# Patient Record
Sex: FEMALE | Race: WHITE | Employment: PART TIME | URBAN - NONMETROPOLITAN AREA
[De-identification: names, ages, dates, MRNs, and addresses within clinical notes are randomized per-mention and may not be internally consistent; named-entity substitution may affect disease eponyms.]

---

## 2017-08-26 ENCOUNTER — HOSPITAL ENCOUNTER (EMERGENCY)
Age: 42
Discharge: HOME | End: 2017-08-26
Payer: COMMERCIAL

## 2017-08-26 VITALS
SYSTOLIC BLOOD PRESSURE: 119 MMHG | RESPIRATION RATE: 20 BRPM | HEART RATE: 91 BPM | HEIGHT: 65 IN | DIASTOLIC BLOOD PRESSURE: 56 MMHG | TEMPERATURE: 98.3 F | OXYGEN SATURATION: 95 %

## 2017-08-26 DIAGNOSIS — N30.01 ACUTE CYSTITIS WITH HEMATURIA: ICD-10-CM

## 2017-08-26 DIAGNOSIS — J44.1 ACUTE EXACERBATION OF CHRONIC OBSTRUCTIVE PULMONARY DISEASE (COPD) (HCC): Primary | ICD-10-CM

## 2017-08-26 LAB
BILIRUBIN URINE: NEGATIVE
BLOOD, URINE: ABNORMAL
CHARACTER, URINE: ABNORMAL
COLOR: YELLOW
GLUCOSE, URINE: NEGATIVE MG/DL
KETONES, URINE: NEGATIVE
LEUKOCYTES, UA: ABNORMAL
NITRATE, UA: NEGATIVE
PH UA: 5.5 (ref 5–9)
PROTEIN UA: 100 MG/DL
REFLEX TO URINE C & S: ABNORMAL
SPECIFIC GRAVITY UA: 1.02 (ref 1–1.03)
UROBILINOGEN, URINE: 0.2 EU/DL (ref 0–1)

## 2017-08-26 PROCEDURE — 6360000002 HC RX W HCPCS: Performed by: NURSE PRACTITIONER

## 2017-08-26 PROCEDURE — 87086 URINE CULTURE/COLONY COUNT: CPT

## 2017-08-26 PROCEDURE — 87186 SC STD MICRODIL/AGAR DIL: CPT

## 2017-08-26 PROCEDURE — 96372 THER/PROPH/DIAG INJ SC/IM: CPT

## 2017-08-26 PROCEDURE — 99214 OFFICE O/P EST MOD 30 MIN: CPT

## 2017-08-26 PROCEDURE — 99214 OFFICE O/P EST MOD 30 MIN: CPT | Performed by: NURSE PRACTITIONER

## 2017-08-26 PROCEDURE — 87184 SC STD DISK METHOD PER PLATE: CPT

## 2017-08-26 PROCEDURE — 81003 URINALYSIS AUTO W/O SCOPE: CPT

## 2017-08-26 PROCEDURE — 87077 CULTURE AEROBIC IDENTIFY: CPT

## 2017-08-26 PROCEDURE — 6370000000 HC RX 637 (ALT 250 FOR IP): Performed by: NURSE PRACTITIONER

## 2017-08-26 RX ORDER — PHENAZOPYRIDINE HYDROCHLORIDE 100 MG/1
100 TABLET, FILM COATED ORAL 3 TIMES DAILY PRN
Qty: 6 TABLET | Refills: 0 | Status: SHIPPED | OUTPATIENT
Start: 2017-08-26 | End: 2017-08-29

## 2017-08-26 RX ORDER — METHYLPREDNISOLONE ACETATE 80 MG/ML
80 INJECTION, SUSPENSION INTRA-ARTICULAR; INTRALESIONAL; INTRAMUSCULAR; SOFT TISSUE ONCE
Status: COMPLETED | OUTPATIENT
Start: 2017-08-26 | End: 2017-08-26

## 2017-08-26 RX ORDER — ALBUTEROL SULFATE 90 UG/1
2 AEROSOL, METERED RESPIRATORY (INHALATION) EVERY 6 HOURS PRN
Qty: 1 INHALER | Refills: 0 | Status: SHIPPED | OUTPATIENT
Start: 2017-08-26 | End: 2017-10-07 | Stop reason: ALTCHOICE

## 2017-08-26 RX ORDER — SULFAMETHOXAZOLE AND TRIMETHOPRIM 800; 160 MG/1; MG/1
1 TABLET ORAL 2 TIMES DAILY
Qty: 6 TABLET | Refills: 0 | Status: SHIPPED | OUTPATIENT
Start: 2017-08-26 | End: 2017-08-29

## 2017-08-26 RX ORDER — IPRATROPIUM BROMIDE AND ALBUTEROL SULFATE 2.5; .5 MG/3ML; MG/3ML
1 SOLUTION RESPIRATORY (INHALATION) ONCE
Status: COMPLETED | OUTPATIENT
Start: 2017-08-26 | End: 2017-08-26

## 2017-08-26 RX ORDER — ALBUTEROL SULFATE 2.5 MG/3ML
2.5 SOLUTION RESPIRATORY (INHALATION) ONCE
Status: COMPLETED | OUTPATIENT
Start: 2017-08-26 | End: 2017-08-26

## 2017-08-26 RX ORDER — ALBUTEROL SULFATE 2.5 MG/3ML
SOLUTION RESPIRATORY (INHALATION)
Status: DISCONTINUED
Start: 2017-08-26 | End: 2017-08-26 | Stop reason: HOSPADM

## 2017-08-26 RX ADMIN — IPRATROPIUM BROMIDE AND ALBUTEROL SULFATE 1 AMPULE: .5; 3 SOLUTION RESPIRATORY (INHALATION) at 16:17

## 2017-08-26 RX ADMIN — ALBUTEROL SULFATE 2.5 MG: 2.5 SOLUTION RESPIRATORY (INHALATION) at 15:44

## 2017-08-26 RX ADMIN — METHYLPREDNISOLONE ACETATE 80 MG: 80 INJECTION, SUSPENSION INTRA-ARTICULAR; INTRALESIONAL; INTRAMUSCULAR; SOFT TISSUE at 16:32

## 2017-08-26 ASSESSMENT — ENCOUNTER SYMPTOMS
ABDOMINAL PAIN: 0
CONSTIPATION: 0
DIARRHEA: 0
VOMITING: 0
WHEEZING: 1
COUGH: 1
SHORTNESS OF BREATH: 1
SINUS PRESSURE: 0
CHEST TIGHTNESS: 1
SORE THROAT: 0
NAUSEA: 0

## 2017-08-26 ASSESSMENT — PAIN DESCRIPTION - LOCATION: LOCATION: ABDOMEN

## 2017-08-26 ASSESSMENT — PAIN DESCRIPTION - ORIENTATION: ORIENTATION: LOWER

## 2017-08-26 ASSESSMENT — PAIN DESCRIPTION - DESCRIPTORS: DESCRIPTORS: PRESSURE

## 2017-08-26 ASSESSMENT — PAIN SCALES - GENERAL: PAINLEVEL_OUTOF10: 8

## 2017-08-28 LAB
ORGANISM: ABNORMAL
URINE CULTURE REFLEX: ABNORMAL

## 2017-10-07 ENCOUNTER — HOSPITAL ENCOUNTER (EMERGENCY)
Age: 42
Discharge: HOME | End: 2017-10-07
Payer: COMMERCIAL

## 2017-10-07 VITALS
SYSTOLIC BLOOD PRESSURE: 114 MMHG | BODY MASS INDEX: 40.27 KG/M2 | OXYGEN SATURATION: 97 % | DIASTOLIC BLOOD PRESSURE: 79 MMHG | TEMPERATURE: 97.2 F | HEART RATE: 69 BPM | WEIGHT: 242 LBS | RESPIRATION RATE: 16 BRPM

## 2017-10-07 DIAGNOSIS — R30.0 DYSURIA: ICD-10-CM

## 2017-10-07 DIAGNOSIS — N89.8 VAGINAL ODOR: ICD-10-CM

## 2017-10-07 DIAGNOSIS — A64 STI (SEXUALLY TRANSMITTED INFECTION): Primary | ICD-10-CM

## 2017-10-07 LAB
BILIRUBIN URINE: NEGATIVE
BLOOD, URINE: NEGATIVE
CHARACTER, URINE: CLEAR
CHLAMYDIA TRACHOMATIS BY RT-PCR: NOT DETECTED
COLOR: YELLOW
CT/NG SOURCE: NORMAL
GLUCOSE, URINE: NEGATIVE MG/DL
KETONES, URINE: NEGATIVE
LEUKOCYTES, UA: NEGATIVE
NEISSERIA GONORRHOEAE BY RT-PCR: NOT DETECTED
NITRATE, UA: NEGATIVE
PH UA: 7 (ref 5–9)
PROTEIN UA: NEGATIVE MG/DL
REFLEX TO URINE C & S: NORMAL
SPECIFIC GRAVITY UA: 1.02 (ref 1–1.03)
TRICHOMONAS PREP: POSITIVE
UROBILINOGEN, URINE: 0.2 EU/DL (ref 0–1)

## 2017-10-07 PROCEDURE — 87491 CHLMYD TRACH DNA AMP PROBE: CPT

## 2017-10-07 PROCEDURE — 96372 THER/PROPH/DIAG INJ SC/IM: CPT

## 2017-10-07 PROCEDURE — 6370000000 HC RX 637 (ALT 250 FOR IP): Performed by: NURSE PRACTITIONER

## 2017-10-07 PROCEDURE — 99214 OFFICE O/P EST MOD 30 MIN: CPT | Performed by: NURSE PRACTITIONER

## 2017-10-07 PROCEDURE — 87070 CULTURE OTHR SPECIMN AEROBIC: CPT

## 2017-10-07 PROCEDURE — 87205 SMEAR GRAM STAIN: CPT

## 2017-10-07 PROCEDURE — 99213 OFFICE O/P EST LOW 20 MIN: CPT

## 2017-10-07 PROCEDURE — 87077 CULTURE AEROBIC IDENTIFY: CPT

## 2017-10-07 PROCEDURE — 2500000003 HC RX 250 WO HCPCS: Performed by: NURSE PRACTITIONER

## 2017-10-07 PROCEDURE — 87591 N.GONORRHOEAE DNA AMP PROB: CPT

## 2017-10-07 PROCEDURE — 6360000002 HC RX W HCPCS: Performed by: NURSE PRACTITIONER

## 2017-10-07 PROCEDURE — 81003 URINALYSIS AUTO W/O SCOPE: CPT

## 2017-10-07 PROCEDURE — 87808 TRICHOMONAS ASSAY W/OPTIC: CPT

## 2017-10-07 RX ORDER — AZITHROMYCIN 250 MG/1
1000 TABLET, FILM COATED ORAL ONCE
Status: COMPLETED | OUTPATIENT
Start: 2017-10-07 | End: 2017-10-07

## 2017-10-07 RX ORDER — METRONIDAZOLE 500 MG/1
2000 TABLET ORAL ONCE
Status: COMPLETED | OUTPATIENT
Start: 2017-10-07 | End: 2017-10-07

## 2017-10-07 RX ORDER — HYDROXYZINE PAMOATE 25 MG/1
25 CAPSULE ORAL 3 TIMES DAILY PRN
COMMUNITY
End: 2018-05-05

## 2017-10-07 RX ADMIN — METRONIDAZOLE 2000 MG: 500 TABLET ORAL at 10:52

## 2017-10-07 RX ADMIN — LIDOCAINE HYDROCHLORIDE 250 MG: 10 INJECTION, SOLUTION EPIDURAL; INFILTRATION; INTRACAUDAL; PERINEURAL at 10:53

## 2017-10-07 RX ADMIN — AZITHROMYCIN 1000 MG: 250 TABLET, FILM COATED ORAL at 10:52

## 2017-10-07 ASSESSMENT — ENCOUNTER SYMPTOMS
ABDOMINAL PAIN: 0
EYE PAIN: 0
WHEEZING: 0
COUGH: 0
NAUSEA: 1
CHOKING: 0
ABDOMINAL DISTENTION: 0
SHORTNESS OF BREATH: 0
ANAL BLEEDING: 0

## 2017-10-07 NOTE — PROGRESS NOTES
Pt ambulatory to restroom, vaginal swabs obtained per pt along with a clean catch urine. Specimens to lab.

## 2017-10-07 NOTE — ED TRIAGE NOTES
Pt to room 4 with c/o a foul smelling yellowish vaginal discharge x 2 days. Pt is currently on an antibiotic for an URI but she cannot remember what it is. She states she has been on it for 7 days and is to take for 10. She would like to be tested for all STDs due to her partner is a  and is gone for long periods of time and she states she doesn't trust him to be faithful.

## 2017-10-07 NOTE — ED PROVIDER NOTES
CONTINUE these medications which have NOT CHANGED    Details   VORTIoxetine HBr (TRINTELLIX) 20 MG TABS tablet Take 10 mg by mouth 2 times daily      hydrOXYzine (VISTARIL) 25 MG capsule Take 25 mg by mouth 3 times daily as needed for Itching      albuterol-ipratropium (COMBIVENT RESPIMAT)  MCG/ACT AERS inhaler Inhale 1 puff into the lungs every 6 hours as needed for Wheezing  Qty: 1 Inhaler, Refills: 0             ALLERGIES     Patient is is allergic to penicillins. FAMILY HISTORY     Patient's family history includes Asthma in her mother; COPD in her mother. SOCIAL HISTORY     Patient  reports that she has been smoking Cigarettes. She has a 20.00 pack-year smoking history. She has never used smokeless tobacco. She reports that she does not drink alcohol or use drugs. PHYSICAL EXAM     ED TRIAGE VITALS  BP: (!) 116/58, Temp: 98.4 °F (36.9 °C), Pulse: 75, Resp: 16, SpO2: 97 %  Physical Exam   Constitutional: She is oriented to person, place, and time. She appears well-developed and well-nourished. No distress. HENT:   Head: Normocephalic. Cardiovascular: Normal rate, regular rhythm, normal heart sounds and intact distal pulses. Pulmonary/Chest: Effort normal and breath sounds normal. No respiratory distress. She has no wheezes. Abdominal: Soft. Bowel sounds are normal.   Musculoskeletal: Normal range of motion. Lymphadenopathy:     She has no cervical adenopathy. Neurological: She is alert and oriented to person, place, and time. Skin: Skin is warm and dry. No rash noted. There is pallor. Psychiatric: She has a normal mood and affect. Her behavior is normal.   Nursing note and vitals reviewed.       DIAGNOSTIC RESULTS   Labs:  Results for orders placed or performed during the hospital encounter of 10/07/17   Trichomonas screen   Result Value Ref Range    Trichomonas Prep POSITIVE NEGATIVE   UA without Microscopic, Reflex C&S   Result Value Ref Range    Glucose, Urine Negative NEGATIVE mg/dl    Bilirubin Urine Negative NEGATIVE    Ketones, Urine Negative NEGATIVE    Specific Gravity, UA 1.020 1.002 - 1.03    Blood, Urine Negative NEGATIVE    pH, UA 7.00 5.0 - 9.0    Protein, UA Negative NEGATIVE mg/dl    Urobilinogen, Urine 0.20 0.0 - 1.0 eu/dl    Nitrate, UA Negative NEGATIVE    LEUKOCYTES, UA Negative NEGATIVE    Color, UA Yellow STRAW-YELL    Character, Urine Clear CLEAR-SL C    REFLEX TO URINE C & S NOT INDICATED        IMAGING:  No orders to display     URGENT CARE COURSE:     Vitals:    10/07/17 0932   BP: (!) 116/58   Pulse: 75   Resp: 16   Temp: 98.4 °F (36.9 °C)   TempSrc: Oral   SpO2: 97%   Weight: 242 lb (109.8 kg)       Medications   azithromycin (ZITHROMAX) tablet 1,000 mg (1,000 mg Oral Given 10/7/17 1052)   metroNIDAZOLE (FLAGYL) tablet 2,000 mg (2,000 mg Oral Given 10/7/17 1052)   cefTRIAXone (ROCEPHIN) 250 mg in lidocaine 1 % 1 mL IM Injection (250 mg Intramuscular Given 10/7/17 1053)     PROCEDURES:  None  FINAL IMPRESSION      1. STI (sexually transmitted infection)    2. Dysuria    3.  Vaginal odor        DISPOSITION/PLAN   DISPOSITION   PATIENT REFERRED TO:  [facility]  [address] [place] [address]  [phone]  In 3 days  As needed, If symptoms worsen    DISCHARGE MEDICATIONS:  Current Discharge Medication List        Current Discharge Medication List          TONIE Obregon CNP  10/07/17 2642

## 2017-10-07 NOTE — ED NOTES
Assessment unchanged. Discharge instructions reviewed with patient/parents. Patient informed to go to ER for worsening symptoms and to follow up with PCP. Patient ambulatory out in stable condition.       Troy Coto RN  10/07/17 0507

## 2017-10-10 LAB
GENITAL CULTURE, ROUTINE: ABNORMAL
GENITAL CULTURE, ROUTINE: ABNORMAL
GRAM STAIN RESULT: ABNORMAL
ORGANISM: ABNORMAL

## 2018-05-02 ENCOUNTER — APPOINTMENT (OUTPATIENT)
Dept: GENERAL RADIOLOGY | Age: 43
End: 2018-05-02
Payer: COMMERCIAL

## 2018-05-02 ENCOUNTER — HOSPITAL ENCOUNTER (EMERGENCY)
Age: 43
Discharge: HOME | End: 2018-05-02
Payer: COMMERCIAL

## 2018-05-02 VITALS
RESPIRATION RATE: 16 BRPM | DIASTOLIC BLOOD PRESSURE: 77 MMHG | TEMPERATURE: 98.4 F | BODY MASS INDEX: 43.27 KG/M2 | OXYGEN SATURATION: 98 % | HEART RATE: 84 BPM | SYSTOLIC BLOOD PRESSURE: 106 MMHG | WEIGHT: 260 LBS

## 2018-05-02 DIAGNOSIS — F14.10 COCAINE ABUSE (HCC): Primary | ICD-10-CM

## 2018-05-02 DIAGNOSIS — F41.0 ANXIETY ATTACK: ICD-10-CM

## 2018-05-02 DIAGNOSIS — Z88.0 H/O PENICILLIN-TYPE ANTIBIOTIC ALLERGY: ICD-10-CM

## 2018-05-02 PROBLEM — F19.959: Status: ACTIVE | Noted: 2018-05-02

## 2018-05-02 LAB
ACETAMINOPHEN LEVEL: < 5 UG/ML (ref 0–20)
ALBUMIN SERPL-MCNC: 4.3 G/DL (ref 3.5–5.1)
ALP BLD-CCNC: 61 U/L (ref 38–126)
ALT SERPL-CCNC: 33 U/L (ref 11–66)
AMPHETAMINE+METHAMPHETAMINE URINE SCREEN: NEGATIVE
ANION GAP SERPL CALCULATED.3IONS-SCNC: 17 MEQ/L (ref 8–16)
AST SERPL-CCNC: 18 U/L (ref 5–40)
BACTERIA: ABNORMAL /HPF
BARBITURATE QUANTITATIVE URINE: NEGATIVE
BASE EXCESS (CALCULATED): -1.5 MMOL/L (ref -2.5–2.5)
BASOPHILS # BLD: 1 %
BASOPHILS ABSOLUTE: 0.1 THOU/MM3 (ref 0–0.1)
BENZODIAZEPINE QUANTITATIVE URINE: NEGATIVE
BILIRUB SERPL-MCNC: 0.5 MG/DL (ref 0.3–1.2)
BILIRUBIN DIRECT: < 0.2 MG/DL (ref 0–0.3)
BILIRUBIN URINE: ABNORMAL
BLOOD, URINE: NEGATIVE
BUN BLDV-MCNC: 22 MG/DL (ref 7–22)
CALCIUM SERPL-MCNC: 9.1 MG/DL (ref 8.5–10.5)
CANNABINOID QUANTITATIVE URINE: NORMAL
CASTS 2: ABNORMAL /LPF
CASTS UA: ABNORMAL /LPF
CHARACTER, URINE: ABNORMAL
CHLORIDE BLD-SCNC: 104 MEQ/L (ref 98–111)
CO2: 21 MEQ/L (ref 23–33)
COCAINE METABOLITE QUANTITATIVE URINE: NORMAL
COLLECTED BY:: NORMAL
COLOR: ABNORMAL
CREAT SERPL-MCNC: 0.8 MG/DL (ref 0.4–1.2)
CRYSTALS, UA: ABNORMAL
DEVICE: NORMAL
EOSINOPHIL # BLD: 1.7 %
EOSINOPHILS ABSOLUTE: 0.2 THOU/MM3 (ref 0–0.4)
EPITHELIAL CELLS, UA: ABNORMAL /HPF
ETHYL ALCOHOL, SERUM: < 0.01 %
GFR SERPL CREATININE-BSD FRML MDRD: 78 ML/MIN/1.73M2
GLUCOSE BLD-MCNC: 107 MG/DL (ref 70–108)
GLUCOSE URINE: NEGATIVE MG/DL
HCO3: 23 MMOL/L (ref 23–28)
HCT VFR BLD CALC: 41.5 % (ref 37–47)
HEMOGLOBIN: 14.3 GM/DL (ref 12–16)
ICTOTEST: NEGATIVE
KETONES, URINE: ABNORMAL
LEUKOCYTE ESTERASE, URINE: NEGATIVE
LYMPHOCYTES # BLD: 20.9 %
LYMPHOCYTES ABSOLUTE: 2 THOU/MM3 (ref 1–4.8)
MCH RBC QN AUTO: 31.1 PG (ref 27–31)
MCHC RBC AUTO-ENTMCNC: 34.4 GM/DL (ref 33–37)
MCV RBC AUTO: 90.4 FL (ref 81–99)
MISCELLANEOUS 2: ABNORMAL
MONOCYTES # BLD: 9.4 %
MONOCYTES ABSOLUTE: 0.9 THOU/MM3 (ref 0.4–1.3)
NITRITE, URINE: NEGATIVE
NUCLEATED RED BLOOD CELLS: 0 /100 WBC
O2 SATURATION: 96 %
OPIATES, URINE: NEGATIVE
OSMOLALITY CALCULATION: 286.9 MOSMOL/KG (ref 275–300)
OXYCODONE: NEGATIVE
PCO2: 38 MMHG (ref 35–45)
PDW BLD-RTO: 14 % (ref 11.5–14.5)
PH BLOOD GAS: 7.4 (ref 7.35–7.45)
PH UA: 5.5
PHENCYCLIDINE QUANTITATIVE URINE: NEGATIVE
PLATELET # BLD: 252 THOU/MM3 (ref 130–400)
PMV BLD AUTO: 8.1 FL (ref 7.4–10.4)
PO2: 83 MMHG (ref 71–104)
POTASSIUM SERPL-SCNC: 4 MEQ/L (ref 3.5–5.2)
PREGNANCY, SERUM: NEGATIVE
PROTEIN UA: 30
RBC # BLD: 4.59 MILL/MM3 (ref 4.2–5.4)
RBC URINE: ABNORMAL /HPF
RENAL EPITHELIAL, UA: ABNORMAL
SALICYLATE, SERUM: < 0.3 MG/DL (ref 2–10)
SEG NEUTROPHILS: 67 %
SEGMENTED NEUTROPHILS ABSOLUTE COUNT: 6.4 THOU/MM3 (ref 1.8–7.7)
SODIUM BLD-SCNC: 142 MEQ/L (ref 135–145)
SOURCE, BLOOD GAS: NORMAL
SPECIFIC GRAVITY, URINE: > 1.03 (ref 1–1.03)
TOTAL PROTEIN: 7.4 G/DL (ref 6.1–8)
TROPONIN T: < 0.01 NG/ML
TSH SERPL DL<=0.05 MIU/L-ACNC: 2.65 UIU/ML (ref 0.4–4.2)
UROBILINOGEN, URINE: 0.2 EU/DL
WBC # BLD: 9.5 THOU/MM3 (ref 4.8–10.8)
WBC UA: ABNORMAL /HPF
YEAST: ABNORMAL

## 2018-05-02 PROCEDURE — 81001 URINALYSIS AUTO W/SCOPE: CPT

## 2018-05-02 PROCEDURE — G0480 DRUG TEST DEF 1-7 CLASSES: HCPCS

## 2018-05-02 PROCEDURE — 84443 ASSAY THYROID STIM HORMONE: CPT

## 2018-05-02 PROCEDURE — 84703 CHORIONIC GONADOTROPIN ASSAY: CPT

## 2018-05-02 PROCEDURE — 6360000002 HC RX W HCPCS: Performed by: PHYSICIAN ASSISTANT

## 2018-05-02 PROCEDURE — 96372 THER/PROPH/DIAG INJ SC/IM: CPT

## 2018-05-02 PROCEDURE — 99284 EMERGENCY DEPT VISIT MOD MDM: CPT

## 2018-05-02 PROCEDURE — 80305 DRUG TEST PRSMV DIR OPT OBS: CPT

## 2018-05-02 PROCEDURE — 71045 X-RAY EXAM CHEST 1 VIEW: CPT

## 2018-05-02 PROCEDURE — 82803 BLOOD GASES ANY COMBINATION: CPT

## 2018-05-02 PROCEDURE — 36600 WITHDRAWAL OF ARTERIAL BLOOD: CPT

## 2018-05-02 PROCEDURE — 85025 COMPLETE CBC W/AUTO DIFF WBC: CPT

## 2018-05-02 PROCEDURE — 36415 COLL VENOUS BLD VENIPUNCTURE: CPT

## 2018-05-02 PROCEDURE — 6370000000 HC RX 637 (ALT 250 FOR IP): Performed by: EMERGENCY MEDICINE

## 2018-05-02 PROCEDURE — 93005 ELECTROCARDIOGRAM TRACING: CPT | Performed by: EMERGENCY MEDICINE

## 2018-05-02 PROCEDURE — 94640 AIRWAY INHALATION TREATMENT: CPT

## 2018-05-02 PROCEDURE — 96374 THER/PROPH/DIAG INJ IV PUSH: CPT

## 2018-05-02 PROCEDURE — 82248 BILIRUBIN DIRECT: CPT

## 2018-05-02 PROCEDURE — 80053 COMPREHEN METABOLIC PANEL: CPT

## 2018-05-02 PROCEDURE — 6360000002 HC RX W HCPCS: Performed by: EMERGENCY MEDICINE

## 2018-05-02 PROCEDURE — 84484 ASSAY OF TROPONIN QUANT: CPT

## 2018-05-02 RX ORDER — DIAPER,BRIEF,INFANT-TODD,DISP
EACH MISCELLANEOUS
Qty: 1 TUBE | Refills: 1 | Status: SHIPPED | OUTPATIENT
Start: 2018-05-02 | End: 2018-05-05

## 2018-05-02 RX ORDER — LORAZEPAM 2 MG/ML
1 INJECTION INTRAMUSCULAR ONCE
Status: COMPLETED | OUTPATIENT
Start: 2018-05-02 | End: 2018-05-02

## 2018-05-02 RX ORDER — IPRATROPIUM BROMIDE AND ALBUTEROL SULFATE 2.5; .5 MG/3ML; MG/3ML
1 SOLUTION RESPIRATORY (INHALATION) ONCE
Status: COMPLETED | OUTPATIENT
Start: 2018-05-02 | End: 2018-05-02

## 2018-05-02 RX ADMIN — LORAZEPAM 1 MG: 2 INJECTION INTRAMUSCULAR; INTRAVENOUS at 12:32

## 2018-05-02 RX ADMIN — LORAZEPAM 1 MG: 2 INJECTION INTRAMUSCULAR; INTRAVENOUS at 18:39

## 2018-05-02 RX ADMIN — IPRATROPIUM BROMIDE AND ALBUTEROL SULFATE 1 AMPULE: .5; 3 SOLUTION RESPIRATORY (INHALATION) at 12:43

## 2018-05-02 RX ADMIN — LORAZEPAM 1 MG: 2 INJECTION INTRAMUSCULAR; INTRAVENOUS at 10:08

## 2018-05-02 RX ADMIN — IPRATROPIUM BROMIDE AND ALBUTEROL SULFATE 1 AMPULE: .5; 3 SOLUTION RESPIRATORY (INHALATION) at 16:46

## 2018-05-02 ASSESSMENT — ENCOUNTER SYMPTOMS
SORE THROAT: 0
VOMITING: 0
WHEEZING: 0
SHORTNESS OF BREATH: 0
NAUSEA: 0
DIARRHEA: 0
RHINORRHEA: 0
COUGH: 0
ABDOMINAL PAIN: 0
BACK PAIN: 0
EYE DISCHARGE: 0
EYE PAIN: 0

## 2018-05-02 ASSESSMENT — SLEEP AND FATIGUE QUESTIONNAIRES
DIFFICULTY FALLING ASLEEP: YES
DIFFICULTY STAYING ASLEEP: YES
SLEEP PATTERN: DIFFICULTY FALLING ASLEEP;NIGHTMARES/TERRORS;DISTURBED/INTERRUPTED SLEEP
RESTFUL SLEEP: NO
DO YOU HAVE DIFFICULTY SLEEPING: YES
DO YOU USE A SLEEP AID: NO
DIFFICULTY ARISING: NO

## 2018-05-02 ASSESSMENT — PATIENT HEALTH QUESTIONNAIRE - PHQ9: SUM OF ALL RESPONSES TO PHQ QUESTIONS 1-9: 19

## 2018-05-02 ASSESSMENT — LIFESTYLE VARIABLES: HISTORY_ALCOHOL_USE: NO

## 2018-05-03 LAB
EKG ATRIAL RATE: 80 BPM
EKG ATRIAL RATE: 99 BPM
EKG P AXIS: 66 DEGREES
EKG P AXIS: 78 DEGREES
EKG P-R INTERVAL: 144 MS
EKG P-R INTERVAL: 144 MS
EKG Q-T INTERVAL: 372 MS
EKG Q-T INTERVAL: 410 MS
EKG QRS DURATION: 76 MS
EKG QRS DURATION: 78 MS
EKG QTC CALCULATION (BAZETT): 472 MS
EKG QTC CALCULATION (BAZETT): 477 MS
EKG R AXIS: -23 DEGREES
EKG R AXIS: -7 DEGREES
EKG T AXIS: -15 DEGREES
EKG T AXIS: 34 DEGREES
EKG VENTRICULAR RATE: 80 BPM
EKG VENTRICULAR RATE: 99 BPM

## 2018-05-03 PROCEDURE — 93010 ELECTROCARDIOGRAM REPORT: CPT | Performed by: INTERNAL MEDICINE

## 2018-05-05 ENCOUNTER — HOSPITAL ENCOUNTER (EMERGENCY)
Age: 43
Discharge: HOME | End: 2018-05-05
Payer: COMMERCIAL

## 2018-05-05 VITALS
HEART RATE: 74 BPM | BODY MASS INDEX: 43.6 KG/M2 | RESPIRATION RATE: 20 BRPM | OXYGEN SATURATION: 98 % | SYSTOLIC BLOOD PRESSURE: 126 MMHG | TEMPERATURE: 98.7 F | WEIGHT: 262 LBS | DIASTOLIC BLOOD PRESSURE: 82 MMHG

## 2018-05-05 DIAGNOSIS — R21 RASH AND OTHER NONSPECIFIC SKIN ERUPTION: Primary | ICD-10-CM

## 2018-05-05 PROCEDURE — 96372 THER/PROPH/DIAG INJ SC/IM: CPT

## 2018-05-05 PROCEDURE — 6360000002 HC RX W HCPCS: Performed by: NURSE PRACTITIONER

## 2018-05-05 PROCEDURE — 99213 OFFICE O/P EST LOW 20 MIN: CPT | Performed by: NURSE PRACTITIONER

## 2018-05-05 PROCEDURE — 99213 OFFICE O/P EST LOW 20 MIN: CPT

## 2018-05-05 RX ORDER — METHYLPREDNISOLONE ACETATE 80 MG/ML
80 INJECTION, SUSPENSION INTRA-ARTICULAR; INTRALESIONAL; INTRAMUSCULAR; SOFT TISSUE ONCE
Status: COMPLETED | OUTPATIENT
Start: 2018-05-05 | End: 2018-05-05

## 2018-05-05 RX ORDER — METHYLPREDNISOLONE 4 MG/1
TABLET ORAL
Qty: 1 KIT | Refills: 0 | Status: SHIPPED | OUTPATIENT
Start: 2018-05-05 | End: 2018-05-11

## 2018-05-05 RX ORDER — TRIAMCINOLONE ACETONIDE 1 MG/G
CREAM TOPICAL
Qty: 80 G | Refills: 0 | Status: SHIPPED | OUTPATIENT
Start: 2018-05-05 | End: 2018-08-04

## 2018-05-05 RX ORDER — LEVOCETIRIZINE DIHYDROCHLORIDE 5 MG/1
5 TABLET, FILM COATED ORAL NIGHTLY
Qty: 30 TABLET | Refills: 0 | Status: SHIPPED | OUTPATIENT
Start: 2018-05-05 | End: 2018-06-04

## 2018-05-05 RX ADMIN — METHYLPREDNISOLONE ACETATE 80 MG: 80 INJECTION, SUSPENSION INTRA-ARTICULAR; INTRALESIONAL; INTRAMUSCULAR; SOFT TISSUE at 14:38

## 2018-05-05 ASSESSMENT — ENCOUNTER SYMPTOMS
PERI-ORBITAL EDEMA: 0
VOMITING: 0
EYE REDNESS: 1
DIARRHEA: 0
NAUSEA: 0
EYE ITCHING: 0
CHEST TIGHTNESS: 0
ABDOMINAL PAIN: 0
WHEEZING: 0
SHORTNESS OF BREATH: 0
SORE THROAT: 0
COUGH: 0
ABDOMINAL DISTENTION: 0
EYE PAIN: 0

## 2018-06-06 ENCOUNTER — HOSPITAL ENCOUNTER (EMERGENCY)
Age: 43
Discharge: HOME | End: 2018-06-06
Payer: COMMERCIAL

## 2018-06-06 VITALS
TEMPERATURE: 98.3 F | OXYGEN SATURATION: 98 % | DIASTOLIC BLOOD PRESSURE: 75 MMHG | WEIGHT: 260 LBS | HEART RATE: 85 BPM | SYSTOLIC BLOOD PRESSURE: 127 MMHG | RESPIRATION RATE: 16 BRPM | BODY MASS INDEX: 43.27 KG/M2

## 2018-06-06 DIAGNOSIS — A64 STD (FEMALE): ICD-10-CM

## 2018-06-06 DIAGNOSIS — N39.0 URINARY TRACT INFECTION WITHOUT HEMATURIA, SITE UNSPECIFIED: Primary | ICD-10-CM

## 2018-06-06 LAB
BILIRUBIN URINE: NEGATIVE
BLOOD, URINE: ABNORMAL
CHARACTER, URINE: ABNORMAL
CHLAMYDIA TRACHOMATIS BY RT-PCR: NOT DETECTED
COLOR: ABNORMAL
CT/NG SOURCE: NORMAL
GLUCOSE, URINE: NEGATIVE MG/DL
KETONES, URINE: NEGATIVE
LEUKOCYTES, UA: ABNORMAL
NEISSERIA GONORRHOEAE BY RT-PCR: NOT DETECTED
NITRATE, UA: NEGATIVE
PH UA: 5.5 (ref 5–9)
PROTEIN UA: ABNORMAL MG/DL
REFLEX TO URINE C & S: ABNORMAL
SPECIFIC GRAVITY UA: 1.02 (ref 1–1.03)
TRICHOMONAS PREP: POSITIVE
UROBILINOGEN, URINE: 0.2 EU/DL (ref 0–1)

## 2018-06-06 PROCEDURE — 87205 SMEAR GRAM STAIN: CPT

## 2018-06-06 PROCEDURE — 99214 OFFICE O/P EST MOD 30 MIN: CPT

## 2018-06-06 PROCEDURE — 87808 TRICHOMONAS ASSAY W/OPTIC: CPT

## 2018-06-06 PROCEDURE — 87086 URINE CULTURE/COLONY COUNT: CPT

## 2018-06-06 PROCEDURE — 87077 CULTURE AEROBIC IDENTIFY: CPT

## 2018-06-06 PROCEDURE — 87184 SC STD DISK METHOD PER PLATE: CPT

## 2018-06-06 PROCEDURE — 87186 SC STD MICRODIL/AGAR DIL: CPT

## 2018-06-06 PROCEDURE — 87491 CHLMYD TRACH DNA AMP PROBE: CPT

## 2018-06-06 PROCEDURE — 87070 CULTURE OTHR SPECIMN AEROBIC: CPT

## 2018-06-06 PROCEDURE — 99213 OFFICE O/P EST LOW 20 MIN: CPT | Performed by: NURSE PRACTITIONER

## 2018-06-06 PROCEDURE — 87591 N.GONORRHOEAE DNA AMP PROB: CPT

## 2018-06-06 PROCEDURE — 81003 URINALYSIS AUTO W/O SCOPE: CPT

## 2018-06-06 RX ORDER — KETOCONAZOLE 20 MG/G
CREAM TOPICAL
Qty: 30 G | Refills: 1 | Status: SHIPPED | OUTPATIENT
Start: 2018-06-06 | End: 2018-06-13

## 2018-06-06 RX ORDER — PHENAZOPYRIDINE HYDROCHLORIDE 100 MG/1
100 TABLET, FILM COATED ORAL 3 TIMES DAILY PRN
Qty: 15 TABLET | Refills: 0 | Status: SHIPPED | OUTPATIENT
Start: 2018-06-06 | End: 2018-06-09

## 2018-06-06 RX ORDER — CARBAMAZEPINE 200 MG/1
400 TABLET ORAL 3 TIMES DAILY
COMMUNITY
End: 2021-04-05 | Stop reason: DRUGHIGH

## 2018-06-06 RX ORDER — NITROFURANTOIN 25; 75 MG/1; MG/1
100 CAPSULE ORAL 2 TIMES DAILY
Qty: 20 CAPSULE | Refills: 0 | Status: SHIPPED | OUTPATIENT
Start: 2018-06-06 | End: 2018-06-16

## 2018-06-06 RX ORDER — METRONIDAZOLE 500 MG/1
500 TABLET ORAL 3 TIMES DAILY
Qty: 30 TABLET | Refills: 0 | Status: SHIPPED | OUTPATIENT
Start: 2018-06-06 | End: 2018-06-16

## 2018-06-06 ASSESSMENT — PAIN SCALES - GENERAL: PAINLEVEL_OUTOF10: 8

## 2018-06-06 ASSESSMENT — PAIN DESCRIPTION - DESCRIPTORS: DESCRIPTORS: PRESSURE

## 2018-06-06 ASSESSMENT — PAIN DESCRIPTION - PAIN TYPE: TYPE: ACUTE PAIN

## 2018-06-06 ASSESSMENT — PAIN DESCRIPTION - LOCATION: LOCATION: ABDOMEN

## 2018-06-08 LAB
ORGANISM: ABNORMAL
URINE CULTURE REFLEX: ABNORMAL
URINE CULTURE REFLEX: ABNORMAL

## 2018-06-09 LAB
GENITAL CULTURE, ROUTINE: NORMAL
GRAM STAIN RESULT: NORMAL

## 2018-08-04 ENCOUNTER — HOSPITAL ENCOUNTER (EMERGENCY)
Age: 43
Discharge: HOME | End: 2018-08-04
Payer: COMMERCIAL

## 2018-08-04 VITALS
BODY MASS INDEX: 44.73 KG/M2 | DIASTOLIC BLOOD PRESSURE: 60 MMHG | RESPIRATION RATE: 16 BRPM | SYSTOLIC BLOOD PRESSURE: 139 MMHG | OXYGEN SATURATION: 99 % | HEIGHT: 64 IN | HEART RATE: 90 BPM | TEMPERATURE: 98.9 F | WEIGHT: 262 LBS

## 2018-08-04 DIAGNOSIS — J02.0 STREP PHARYNGITIS: Primary | ICD-10-CM

## 2018-08-04 LAB
GROUP A STREP CULTURE, REFLEX: POSITIVE
REFLEX THROAT C + S: NORMAL

## 2018-08-04 PROCEDURE — 2709999900 HC NON-CHARGEABLE SUPPLY

## 2018-08-04 PROCEDURE — 99214 OFFICE O/P EST MOD 30 MIN: CPT

## 2018-08-04 PROCEDURE — 99214 OFFICE O/P EST MOD 30 MIN: CPT | Performed by: NURSE PRACTITIONER

## 2018-08-04 RX ORDER — IBUPROFEN 800 MG/1
800 TABLET ORAL EVERY 6 HOURS PRN
Qty: 30 TABLET | Refills: 0 | Status: SHIPPED | OUTPATIENT
Start: 2018-08-04 | End: 2021-04-05

## 2018-08-04 RX ORDER — AZITHROMYCIN 250 MG/1
TABLET, FILM COATED ORAL
Qty: 1 PACKET | Refills: 0 | Status: SHIPPED | OUTPATIENT
Start: 2018-08-04 | End: 2019-08-01

## 2018-08-04 ASSESSMENT — PAIN SCALES - GENERAL: PAINLEVEL_OUTOF10: 8

## 2018-08-04 ASSESSMENT — PAIN DESCRIPTION - LOCATION: LOCATION: THROAT

## 2018-08-04 ASSESSMENT — ENCOUNTER SYMPTOMS
SINUS PRESSURE: 0
CHEST TIGHTNESS: 0
NAUSEA: 0
SORE THROAT: 1
STRIDOR: 0
RHINORRHEA: 0
COUGH: 0
ABDOMINAL PAIN: 0
WHEEZING: 0
VOMITING: 0
SINUS CONGESTION: 0
VOICE CHANGE: 0
SHORTNESS OF BREATH: 0
TROUBLE SWALLOWING: 0

## 2018-08-04 ASSESSMENT — PAIN DESCRIPTION - ONSET: ONSET: GRADUAL

## 2018-08-04 ASSESSMENT — PAIN DESCRIPTION - DESCRIPTORS: DESCRIPTORS: ACHING

## 2018-08-04 ASSESSMENT — PAIN DESCRIPTION - ORIENTATION: ORIENTATION: LEFT

## 2018-08-04 ASSESSMENT — PAIN DESCRIPTION - PROGRESSION: CLINICAL_PROGRESSION: GRADUALLY WORSENING

## 2018-08-04 ASSESSMENT — PAIN DESCRIPTION - PAIN TYPE: TYPE: ACUTE PAIN

## 2018-08-04 ASSESSMENT — PAIN DESCRIPTION - FREQUENCY: FREQUENCY: INTERMITTENT

## 2018-08-04 NOTE — ED PROVIDER NOTES
d[facility]) [facility]     Depression     PTSD (post-traumatic stress disorder)        SURGICAL HISTORY     Patient  has a past surgical history that includes Tubal ligation. CURRENT MEDICATIONS       Discharge Medication List as of 8/4/2018  2:08 PM      CONTINUE these medications which have NOT CHANGED    Details   carBAMazepine (TEGRETOL) 200 MG tablet Take 200 mg by mouth 3 times dailyHistorical Med             ALLERGIES     Patient is is allergic to penicillins. FAMILY HISTORY     Patient's family history includes Asthma in her mother; COPD in her mother. SOCIAL HISTORY     Patient  reports that she has been smoking Cigarettes. She has a 20.00 pack-year smoking history. She has never used smokeless tobacco. She reports that she does not drink alcohol or use drugs. PHYSICAL EXAM     ED TRIAGE VITALS  BP: 139/60, Temp: 98.9 °F (37.2 °C), Pulse: 90, Resp: 16, SpO2: 99 %  Physical Exam   Constitutional: She is oriented to person, place, and time. Vital signs are normal. She appears well-developed and well-nourished. Non-toxic appearance. She does not have a sickly appearance. She does not appear ill. No distress. HENT:   Head: Normocephalic and atraumatic. Right Ear: Hearing, tympanic membrane, external ear and ear canal normal.   Left Ear: Hearing, tympanic membrane, external ear and ear canal normal.   Nose: Nose normal.   Mouth/Throat: Uvula is midline and mucous membranes are normal. No oral lesions. No trismus in the jaw. No uvula swelling. Oropharyngeal exudate and posterior oropharyngeal erythema present. No posterior oropharyngeal edema. Neck: Normal range of motion. Neck supple. Cardiovascular: Normal rate, regular rhythm, S1 normal, S2 normal and normal heart sounds. No murmur heard. Pulmonary/Chest: Effort normal and breath sounds normal. No accessory muscle usage or stridor. No respiratory distress. She has no decreased breath sounds. She has no wheezes. She has no rhonchi.  She 2:08 PM      START taking these medications    Details   azithromycin (ZITHROMAX) 250 MG tablet Take 500 mg ( 1 tab) oral on day 1 Take 250 mg (2 tabs) oral on day 2-5 once a day, Disp-1 packet, R-0Normal      ibuprofen (IBU) 800 MG tablet Take 1 tablet by mouth every 6 hours as needed for Pain Take with food. , Disp-30 tablet, R-0Normal           Discharge Medication List as of 8/4/2018  2:08 PM          Patient given educational materials - see patient instructions. Discussed use, benefit, and side effects of prescribed medications. All patient questions answered. Pt voiced understanding. Reviewed health maintenance. Patient agreed with treatment plan. Follow up as directed.      PANTERA Butler CNP, APRN - CNP  08/04/18 [id number]

## 2019-07-15 ENCOUNTER — HOSPITAL ENCOUNTER (OUTPATIENT)
Age: 44
Setting detail: SPECIMEN
Discharge: HOME | End: 2019-07-15
Payer: COMMERCIAL

## 2019-07-15 LAB
ABSOLUTE EOS #: 0.06 K/UL (ref 0–0.44)
ABSOLUTE IMMATURE GRANULOCYTE: <0.03 K/UL (ref 0–0.3)
ABSOLUTE LYMPH #: 1.9 K/UL (ref 1.1–3.7)
ABSOLUTE MONO #: 0.51 K/UL (ref 0.1–1.2)
ALBUMIN SERPL-MCNC: 3.7 G/DL (ref 3.5–5.2)
ALBUMIN/GLOBULIN RATIO: 1.8 (ref 1–2.5)
ALP BLD-CCNC: 67 U/L (ref 35–104)
ALT SERPL-CCNC: 26 U/L (ref 5–33)
ANION GAP SERPL CALCULATED.3IONS-SCNC: 11 MMOL/L (ref 9–17)
AST SERPL-CCNC: 18 U/L
BASOPHILS # BLD: 0 % (ref 0–2)
BASOPHILS ABSOLUTE: 0.03 K/UL (ref 0–0.2)
BILIRUB SERPL-MCNC: 0.26 MG/DL (ref 0.3–1.2)
BUN BLDV-MCNC: 13 MG/DL (ref 6–20)
BUN/CREAT BLD: ABNORMAL (ref 9–20)
CALCIUM SERPL-MCNC: 8.7 MG/DL (ref 8.6–10.4)
CHLORIDE BLD-SCNC: 104 MMOL/L (ref 98–107)
CHOLESTEROL/HDL RATIO: 3.1
CHOLESTEROL: 171 MG/DL
CO2: 24 MMOL/L (ref 20–31)
CREAT SERPL-MCNC: 0.67 MG/DL (ref 0.5–0.9)
DIFFERENTIAL TYPE: NORMAL
EOSINOPHILS RELATIVE PERCENT: 1 % (ref 1–4)
GFR AFRICAN AMERICAN: >60 ML/MIN
GFR NON-AFRICAN AMERICAN: >60 ML/MIN
GFR SERPL CREATININE-BSD FRML MDRD: ABNORMAL ML/MIN/{1.73_M2}
GFR SERPL CREATININE-BSD FRML MDRD: ABNORMAL ML/MIN/{1.73_M2}
GLUCOSE BLD-MCNC: 83 MG/DL (ref 70–99)
HCT VFR BLD CALC: 41.2 % (ref 36.3–47.1)
HDLC SERPL-MCNC: 56 MG/DL
HEMOGLOBIN: 13.1 G/DL (ref 11.9–15.1)
IMMATURE GRANULOCYTES: 0 %
LDL CHOLESTEROL: 80 MG/DL (ref 0–130)
LYMPHOCYTES # BLD: 28 % (ref 24–43)
MCH RBC QN AUTO: 30.5 PG (ref 25.2–33.5)
MCHC RBC AUTO-ENTMCNC: 31.8 G/DL (ref 28.4–34.8)
MCV RBC AUTO: 96 FL (ref 82.6–102.9)
MONOCYTES # BLD: 8 % (ref 3–12)
NRBC AUTOMATED: 0 PER 100 WBC
PDW BLD-RTO: 13.2 % (ref 11.8–14.4)
PLATELET # BLD: 203 K/UL (ref 138–453)
PLATELET ESTIMATE: NORMAL
PMV BLD AUTO: 10.9 FL (ref 8.1–13.5)
POTASSIUM SERPL-SCNC: 4.3 MMOL/L (ref 3.7–5.3)
RBC # BLD: 4.29 M/UL (ref 3.95–5.11)
RBC # BLD: NORMAL 10*6/UL
SEG NEUTROPHILS: 63 % (ref 36–65)
SEGMENTED NEUTROPHILS ABSOLUTE COUNT: 4.2 K/UL (ref 1.5–8.1)
SODIUM BLD-SCNC: 139 MMOL/L (ref 135–144)
TOTAL PROTEIN: 5.8 G/DL (ref 6.4–8.3)
TRIGL SERPL-MCNC: 174 MG/DL
TSH SERPL DL<=0.05 MIU/L-ACNC: 3.58 MIU/L (ref 0.3–5)
VLDLC SERPL CALC-MCNC: ABNORMAL MG/DL (ref 1–30)
WBC # BLD: 6.7 K/UL (ref 3.5–11.3)
WBC # BLD: NORMAL 10*3/UL

## 2019-08-01 ENCOUNTER — HOSPITAL ENCOUNTER (EMERGENCY)
Dept: GENERAL RADIOLOGY | Age: 44
Discharge: HOME | End: 2019-08-01
Payer: COMMERCIAL

## 2019-08-01 ENCOUNTER — HOSPITAL ENCOUNTER (EMERGENCY)
Age: 44
Discharge: ANOTHER HOSPITAL | End: 2019-08-01
Payer: COMMERCIAL

## 2019-08-01 VITALS
DIASTOLIC BLOOD PRESSURE: 72 MMHG | SYSTOLIC BLOOD PRESSURE: 108 MMHG | TEMPERATURE: 98 F | BODY MASS INDEX: 44.39 KG/M2 | RESPIRATION RATE: 22 BRPM | HEART RATE: 74 BPM | WEIGHT: 260 LBS | HEIGHT: 64 IN | OXYGEN SATURATION: 100 %

## 2019-08-01 DIAGNOSIS — J44.1 COPD EXACERBATION (HCC): Primary | ICD-10-CM

## 2019-08-01 PROCEDURE — 99214 OFFICE O/P EST MOD 30 MIN: CPT | Performed by: NURSE PRACTITIONER

## 2019-08-01 PROCEDURE — 94640 AIRWAY INHALATION TREATMENT: CPT

## 2019-08-01 PROCEDURE — 2709999900 HC NON-CHARGEABLE SUPPLY

## 2019-08-01 PROCEDURE — 6370000000 HC RX 637 (ALT 250 FOR IP): Performed by: NURSE PRACTITIONER

## 2019-08-01 PROCEDURE — 6360000002 HC RX W HCPCS: Performed by: NURSE PRACTITIONER

## 2019-08-01 PROCEDURE — 99212 OFFICE O/P EST SF 10 MIN: CPT

## 2019-08-01 PROCEDURE — 96372 THER/PROPH/DIAG INJ SC/IM: CPT

## 2019-08-01 PROCEDURE — 71046 X-RAY EXAM CHEST 2 VIEWS: CPT

## 2019-08-01 RX ORDER — BUSPIRONE HYDROCHLORIDE 15 MG/1
15 TABLET ORAL 3 TIMES DAILY
COMMUNITY
End: 2021-04-05 | Stop reason: DRUGHIGH

## 2019-08-01 RX ORDER — IPRATROPIUM BROMIDE AND ALBUTEROL SULFATE 2.5; .5 MG/3ML; MG/3ML
SOLUTION RESPIRATORY (INHALATION)
Status: DISCONTINUED
Start: 2019-08-01 | End: 2019-08-01 | Stop reason: HOSPADM

## 2019-08-01 RX ORDER — METHYLPREDNISOLONE ACETATE 80 MG/ML
120 INJECTION, SUSPENSION INTRA-ARTICULAR; INTRALESIONAL; INTRAMUSCULAR; SOFT TISSUE ONCE
Status: COMPLETED | OUTPATIENT
Start: 2019-08-01 | End: 2019-08-01

## 2019-08-01 RX ORDER — IPRATROPIUM BROMIDE AND ALBUTEROL SULFATE 2.5; .5 MG/3ML; MG/3ML
1 SOLUTION RESPIRATORY (INHALATION)
Status: DISCONTINUED | OUTPATIENT
Start: 2019-08-01 | End: 2019-08-01 | Stop reason: HOSPADM

## 2019-08-01 RX ADMIN — IPRATROPIUM BROMIDE AND ALBUTEROL SULFATE 1 AMPULE: .5; 3 SOLUTION RESPIRATORY (INHALATION) at 13:34

## 2019-08-01 RX ADMIN — METHYLPREDNISOLONE ACETATE 120 MG: 80 INJECTION, SUSPENSION INTRA-ARTICULAR; INTRALESIONAL; INTRAMUSCULAR; SOFT TISSUE at 13:43

## 2019-08-01 ASSESSMENT — ENCOUNTER SYMPTOMS
COUGH: 1
CHEST TIGHTNESS: 1
SHORTNESS OF BREATH: 1
EYE DISCHARGE: 0
GASTROINTESTINAL NEGATIVE: 1
WHEEZING: 1

## 2019-08-01 NOTE — ED PROVIDER NOTES
Abdominal: Soft. Musculoskeletal: Normal range of motion. Neurological: She is alert and oriented to person, place, and time. Skin: Skin is warm and dry. Capillary refill takes less than 2 seconds. There is pallor. Psychiatric: She has a normal mood and affect. Her behavior is normal.   Nursing note and vitals reviewed. DIAGNOSTIC RESULTS   Labs: No results found for this visit on 08/01/19. IMAGING:  XR CHEST STANDARD (2 VW)   Final Result      Few reticular markings in the lung bases which may be related to chronic findings         **This report has been created using voice recognition software. It may contain minor errors which are inherent in voice recognition technology. **      Final report electronically signed by Dr. Clifton Felix on 8/1/2019 1:59 PM        URGENT CARE COURSE:     Vitals:    08/01/19 1331   BP: 103/69   Pulse: 77   Resp: 26   Temp: 98 °F (36.7 °C)   SpO2: 99%   Weight: 260 lb (117.9 kg)   Height: 5' 4\" (1.626 m)       Medications   ipratropium-albuterol (DUONEB) nebulizer solution 1 ampule (1 ampule Inhalation Given 8/1/19 1334)   ipratropium-albuterol (DUONEB) 0.5-2.5 (3) MG/3ML nebulizer solution (has no administration in time range)   methylPREDNISolone acetate (DEPO-MEDROL) injection 120 mg (120 mg Intramuscular Given 8/1/19 1343)     PROCEDURES:  None  FINALIMPRESSION      1. COPD exacerbation ([facility]      Transfer to ER for further evaluation.    DISPOSITION/PLAN   DISPOSITION      PATIENT REFERRED TO:  [facility]  ER  [address] [place] [id number]  Go to       DISCHARGE MEDICATIONS:  Current Discharge Medication List        Current Discharge Medication List          PANTERA Garcia CNP, APRN - CNP  08/01/19 1987

## 2019-08-01 NOTE — ED TRIAGE NOTES
Pt to [facility] ambulatory with SOB. Pt stated the SOB started 2 days ago. Pt has a history of COPD and asthma. Pt has audible wheezing present. Brisk capillary refill present in fingers. Peripheral pulses present in extremities. Marjorie Del Castillo CNP notified that pt was here.

## 2019-12-09 ENCOUNTER — HOSPITAL ENCOUNTER (OUTPATIENT)
Age: 44
Setting detail: SPECIMEN
Discharge: HOME | End: 2019-12-09
Payer: COMMERCIAL

## 2019-12-09 LAB
ALBUMIN SERPL-MCNC: 4.3 G/DL (ref 3.5–5.2)
ALBUMIN/GLOBULIN RATIO: 1.8 (ref 1–2.5)
ALP BLD-CCNC: 77 U/L (ref 35–104)
ALT SERPL-CCNC: 23 U/L (ref 5–33)
ANION GAP SERPL CALCULATED.3IONS-SCNC: 14 MMOL/L (ref 9–17)
AST SERPL-CCNC: 14 U/L
BILIRUB SERPL-MCNC: 0.15 MG/DL (ref 0.3–1.2)
BUN BLDV-MCNC: 14 MG/DL (ref 6–20)
BUN/CREAT BLD: ABNORMAL (ref 9–20)
CALCIUM SERPL-MCNC: 9.4 MG/DL (ref 8.6–10.4)
CHLORIDE BLD-SCNC: 105 MMOL/L (ref 98–107)
CHOLESTEROL/HDL RATIO: 3
CHOLESTEROL: 180 MG/DL
CO2: 26 MMOL/L (ref 20–31)
CREAT SERPL-MCNC: 0.66 MG/DL (ref 0.5–0.9)
GFR AFRICAN AMERICAN: >60 ML/MIN
GFR NON-AFRICAN AMERICAN: >60 ML/MIN
GFR SERPL CREATININE-BSD FRML MDRD: ABNORMAL ML/MIN/{1.73_M2}
GFR SERPL CREATININE-BSD FRML MDRD: ABNORMAL ML/MIN/{1.73_M2}
GLUCOSE BLD-MCNC: 89 MG/DL (ref 70–99)
HCT VFR BLD CALC: 39.8 % (ref 36.3–47.1)
HDLC SERPL-MCNC: 60 MG/DL
HEMOGLOBIN: 13.2 G/DL (ref 11.9–15.1)
LDL CHOLESTEROL: 84 MG/DL (ref 0–130)
MCH RBC QN AUTO: 31.3 PG (ref 25.2–33.5)
MCHC RBC AUTO-ENTMCNC: 33.2 G/DL (ref 28.4–34.8)
MCV RBC AUTO: 94.3 FL (ref 82.6–102.9)
NRBC AUTOMATED: 0 PER 100 WBC
PDW BLD-RTO: 12.1 % (ref 11.8–14.4)
PLATELET # BLD: 240 K/UL (ref 138–453)
PMV BLD AUTO: 10.3 FL (ref 8.1–13.5)
POTASSIUM SERPL-SCNC: 4.5 MMOL/L (ref 3.7–5.3)
RBC # BLD: 4.22 M/UL (ref 3.95–5.11)
SODIUM BLD-SCNC: 145 MMOL/L (ref 135–144)
TOTAL PROTEIN: 6.7 G/DL (ref 6.4–8.3)
TRIGL SERPL-MCNC: 180 MG/DL
TSH SERPL DL<=0.05 MIU/L-ACNC: 0.08 MIU/L (ref 0.3–5)
VLDLC SERPL CALC-MCNC: ABNORMAL MG/DL (ref 1–30)
WBC # BLD: 5.9 K/UL (ref 3.5–11.3)

## 2019-12-16 ENCOUNTER — HOSPITAL ENCOUNTER (OUTPATIENT)
Age: 44
Setting detail: SPECIMEN
Discharge: HOME | End: 2019-12-16
Payer: COMMERCIAL

## 2019-12-16 LAB — TSH SERPL DL<=0.05 MIU/L-ACNC: 0.56 MIU/L (ref 0.3–5)

## 2020-05-26 ENCOUNTER — HOSPITAL ENCOUNTER (EMERGENCY)
Age: 45
Discharge: HOME | End: 2020-05-26
Payer: COMMERCIAL

## 2020-05-26 VITALS
BODY MASS INDEX: 47.8 KG/M2 | HEART RATE: 92 BPM | HEIGHT: 64 IN | OXYGEN SATURATION: 98 % | SYSTOLIC BLOOD PRESSURE: 130 MMHG | DIASTOLIC BLOOD PRESSURE: 79 MMHG | RESPIRATION RATE: 14 BRPM | WEIGHT: 280 LBS | TEMPERATURE: 98.3 F

## 2020-05-26 PROCEDURE — 99213 OFFICE O/P EST LOW 20 MIN: CPT | Performed by: NURSE PRACTITIONER

## 2020-05-26 PROCEDURE — 99212 OFFICE O/P EST SF 10 MIN: CPT

## 2020-05-26 RX ORDER — METRONIDAZOLE 500 MG/1
500 TABLET ORAL 2 TIMES DAILY
Qty: 14 TABLET | Refills: 0 | Status: SHIPPED | OUTPATIENT
Start: 2020-05-26 | End: 2020-06-02

## 2020-05-26 RX ORDER — TRAZODONE HYDROCHLORIDE 100 MG/1
100 TABLET ORAL NIGHTLY
COMMUNITY
End: 2021-04-05

## 2020-05-26 RX ORDER — HYDROXYZINE PAMOATE 25 MG/1
25 CAPSULE ORAL 3 TIMES DAILY PRN
COMMUNITY
End: 2021-04-05 | Stop reason: DRUGHIGH

## 2020-05-26 ASSESSMENT — ENCOUNTER SYMPTOMS
NAUSEA: 0
BACK PAIN: 0
ABDOMINAL PAIN: 0
VOMITING: 0
SHORTNESS OF BREATH: 0
TROUBLE SWALLOWING: 0

## 2020-05-26 NOTE — ED PROVIDER NOTES
[phone]       Chief Complaint   Patient presents with    Vaginal Discharge     c/o vaginal odor and discharge. \" I think I have bacterial infection. Petra had it before\"       Nurses Notes reviewed and I agree except as noted in the HPI. HISTORY OF PRESENT ILLNESS   Doe Swanson is a 54 y.o. female who presents with c/o vaginal discharge. Onset less than 1 week ago, unchanged. States a milky white w/ foul odor and vaginal irritation. Denies STD. States similar to when she had BV. No treatment prior to arrival.  REVIEW OF SYSTEMS     Review of Systems   Constitutional: Negative for fever. HENT: Negative for trouble swallowing. Respiratory: Negative for shortness of breath. Cardiovascular: Negative for chest pain. Gastrointestinal: Negative for abdominal pain, nausea and vomiting. Genitourinary: Positive for vaginal discharge. Negative for decreased urine volume, difficulty urinating, dysuria, flank pain, frequency, genital sores, hematuria, menstrual problem, pelvic pain, urgency, vaginal bleeding and vaginal pain. Musculoskeletal: Negative for back pain. Skin: Negative for rash. Neurological: Negative for headaches. Hematological: Negative for adenopathy. Psychiatric/Behavioral: Negative for sleep disturbance. PAST MEDICAL HISTORY         Diagnosis Date    Anxiety     Asthma     Bipolar 1 disorder ([facility]     COPD (chronic obstructive pulmonary disease) ([facility])     Depression     PTSD (post-traumatic stress disorder)        SURGICAL HISTORY     Patient  has a past surgical history that includes Tubal ligation.     CURRENT MEDICATIONS       Discharge Medication List as of 5/26/2020  3:28 PM      CONTINUE these medications which have NOT CHANGED    Details   hydrOXYzine (VISTARIL) 25 MG capsule Take 25 mg by mouth 3 times daily as needed for ItchingHistorical Med      VORTIoxetine (TRINTELLIX) 5 MG tablet Take 10 mg by mouth dailyHistorical Med      traZODone (DESYREL) 100 MG tablet Take 100 mg by mouth nightlyHistorical Med      busPIRone (BUSPAR) 15 MG tablet Take 15 mg by mouth 3 times dailyHistorical Med      carBAMazepine (TEGRETOL) 200 MG tablet Take 400 mg by mouth 3 times daily Historical Med      ibuprofen (IBU) 800 MG tablet Take 1 tablet by mouth every 6 hours as needed for Pain Take with food. , Disp-30 tablet, R-0Normal             ALLERGIES     Patient is is allergic to penicillins. FAMILY HISTORY     Patient'sfamily history includes Asthma in her mother; COPD in her mother. SOCIAL HISTORY     Patient  reports that she has been smoking cigarettes. She has a 10.00 pack-year smoking history. She has never used smokeless tobacco. She reports that she does not drink alcohol or use drugs. PHYSICAL EXAM     ED TRIAGE VITALS  BP: 130/79, Temp: 98.3 °F (36.8 °C), Pulse: 92, Resp: 14, SpO2: 98 %  Physical Exam  Vitals signs and nursing note reviewed. Constitutional:       General: She is not in acute distress. Appearance: Normal appearance. She is well-developed. She is not ill-appearing, toxic-appearing or diaphoretic. HENT:      Head: Normocephalic and atraumatic. Eyes:      Conjunctiva/sclera: Conjunctivae normal.   Pulmonary:      Effort: No respiratory distress. Abdominal:      General: Abdomen is flat. Bowel sounds are normal. There is no distension. Palpations: Abdomen is soft. There is no mass. Tenderness: There is no abdominal tenderness. There is no right CVA tenderness, left CVA tenderness or guarding. Hernia: No hernia is present. Lymphadenopathy:      Lower Body: No right inguinal adenopathy. No left inguinal adenopathy. Skin:     General: Skin is warm and dry. Capillary Refill: Capillary refill takes less than 2 seconds. Neurological:      Mental Status: She is alert and oriented to person, place, and time.    Psychiatric:         Mood and Affect: Mood

## 2020-08-05 ENCOUNTER — HOSPITAL ENCOUNTER (EMERGENCY)
Age: 45
Discharge: HOME | End: 2020-08-05
Payer: COMMERCIAL

## 2020-08-05 VITALS
DIASTOLIC BLOOD PRESSURE: 60 MMHG | HEIGHT: 64 IN | SYSTOLIC BLOOD PRESSURE: 124 MMHG | HEART RATE: 66 BPM | WEIGHT: 282 LBS | OXYGEN SATURATION: 97 % | TEMPERATURE: 97.8 F | RESPIRATION RATE: 16 BRPM | BODY MASS INDEX: 48.14 KG/M2

## 2020-08-05 PROCEDURE — 99213 OFFICE O/P EST LOW 20 MIN: CPT

## 2020-08-05 PROCEDURE — 99213 OFFICE O/P EST LOW 20 MIN: CPT | Performed by: NURSE PRACTITIONER

## 2020-08-05 PROCEDURE — 87070 CULTURE OTHR SPECIMN AEROBIC: CPT

## 2020-08-05 PROCEDURE — 87205 SMEAR GRAM STAIN: CPT

## 2020-08-05 RX ORDER — GREEN TEA/HOODIA GORDONII 315-12.5MG
1 CAPSULE ORAL DAILY
Qty: 30 TABLET | Refills: 0 | COMMUNITY
Start: 2020-08-05 | End: 2020-09-04

## 2020-08-05 RX ORDER — FLUCONAZOLE 150 MG/1
150 TABLET ORAL ONCE
Qty: 1 TABLET | Refills: 0 | Status: SHIPPED | OUTPATIENT
Start: 2020-08-05 | End: 2020-08-05

## 2020-08-05 RX ORDER — METRONIDAZOLE 500 MG/1
500 TABLET ORAL 2 TIMES DAILY
Qty: 14 TABLET | Refills: 0 | Status: SHIPPED | OUTPATIENT
Start: 2020-08-05 | End: 2020-08-12

## 2020-08-05 RX ORDER — SERTRALINE HYDROCHLORIDE 100 MG/1
200 TABLET, FILM COATED ORAL DAILY
COMMUNITY
End: 2021-06-27 | Stop reason: ALTCHOICE

## 2020-08-05 ASSESSMENT — ENCOUNTER SYMPTOMS
CONSTIPATION: 0
WHEEZING: 0
APNEA: 0
VOMITING: 0
BLOOD IN STOOL: 0
RECTAL PAIN: 0
CHOKING: 0
CHEST TIGHTNESS: 0
DIARRHEA: 0
ANAL BLEEDING: 0
ABDOMINAL DISTENTION: 0
ABDOMINAL PAIN: 0
NAUSEA: 0
COUGH: 0
STRIDOR: 0
SHORTNESS OF BREATH: 0

## 2020-08-05 NOTE — ED TRIAGE NOTES
Pt ambulatory into esuc with c/o vaginal discharge that is creamy white with slight odor and some vaginal itching for the past two days. Pt denies pain.

## 2020-08-05 NOTE — ED NOTES
Pt verbalized discharge instructions. Pt informed to go to ER if develop chest pain, shortness of breath or abdominal pain. Pt ambulatory out in stable condition. Assessment unchanged.        Enzo Will RN  08/05/20 [id number]

## 2020-08-05 NOTE — ED PROVIDER NOTES
[place] [address]  Urgent Care Encounter      CHIEF COMPLAINT       Chief Complaint   Patient presents with    Vaginal Discharge     white creamy discharge with itching with slight odor        Nurses Notes reviewed and I agree except as noted in the HPI. HISTORY OFPRESENT ILLNESS   Mayte Carrasco is a 54 y.o. The history is provided by the patient. No  was used. Vaginal Discharge   Quality:  White, thin and thick  Severity:  Moderate  Onset quality:  Sudden  Duration:  2 days  Timing:  Constant  Progression:  Worsening  Context: spontaneously    Context: not after intercourse, not after urination, not at rest, not during bowel movement, not during intercourse, not during pregnancy, not during urination, not genital trauma and not recent antibiotic use    Relieved by:  Nothing  Worsened by:  Nothing  Ineffective treatments:  None tried  Associated symptoms: no abdominal pain, no dyspareunia, no dysuria, no fever, no genital lesions, no nausea, no rash, no urinary frequency, no urinary hesitancy, no urinary incontinence, no vaginal itching and no vomiting    Risk factors: no endometriosis, no foreign body, no gynecological surgery, no immunosuppression, no new sexual partner, no PID, no prior miscarriage, no STI, no STI exposure, no terminated pregnancy and no unprotected sex        REVIEW OF SYSTEMS     Review of Systems   Constitutional: Negative for activity change, appetite change, chills, diaphoresis, fatigue and fever. Respiratory: Negative for apnea, cough, choking, chest tightness, shortness of breath, wheezing and stridor. Cardiovascular: Negative for chest pain, palpitations and leg swelling. Gastrointestinal: Negative for abdominal distention, abdominal pain, anal bleeding, blood in stool, constipation, diarrhea, nausea, rectal pain and vomiting. Genitourinary: Positive for vaginal discharge.  Negative for bladder incontinence, decreased urine volume, difficulty urinating, dyspareunia, dysuria, enuresis, flank pain, frequency, genital sores, hematuria, hesitancy, menstrual problem, pelvic pain, urgency, vaginal bleeding and vaginal pain. Neurological: Negative for dizziness, light-headedness and headaches. PAST MEDICAL HISTORY         Diagnosis Date    Anxiety     Asthma     Bipolar 1 disorder ([facility] [facility]     COPD (chronic obstructive pulmonary disease) ([facility])     Depression     PTSD (post-traumatic stress disorder)        SURGICAL HISTORY     Patient  has a past surgical history that includes Tubal ligation. CURRENT MEDICATIONS       Discharge Medication List as of 8/5/2020  5:51 PM      CONTINUE these medications which have NOT CHANGED    Details   sertraline (ZOLOFT) 100 MG tablet Take 200 mg by mouth dailyHistorical Med      hydrOXYzine (VISTARIL) 25 MG capsule Take 25 mg by mouth 3 times daily as needed for ItchingHistorical Med      traZODone (DESYREL) 100 MG tablet Take 100 mg by mouth nightlyHistorical Med      busPIRone (BUSPAR) 15 MG tablet Take 15 mg by mouth 3 times dailyHistorical Med      ibuprofen (IBU) 800 MG tablet Take 1 tablet by mouth every 6 hours as needed for Pain Take with food. , Disp-30 tablet, R-0Normal      carBAMazepine (TEGRETOL) 200 MG tablet Take 400 mg by mouth 3 times daily Historical Med             ALLERGIES     Patient is is allergic to penicillins. FAMILY HISTORY     Patient's family history includes Asthma in her mother; COPD in her mother. SOCIAL HISTORY     Patient  reports that she has been smoking cigarettes. She has a 10.00 pack-year smoking history. She has never used smokeless tobacco. She reports that she does not drink alcohol or use drugs. PHYSICAL EXAM     ED TRIAGE VITALS  BP: 124/60, Temp: 97.8 °F (36.6 °C), Pulse: 66, Resp: 16, SpO2: 97 %  Physical Exam  Vitals signs and nursing note reviewed. Constitutional:       General: She is not in acute distress. Appearance: Normal appearance.  She agreeable to the plan. The patient or Patient designated representative was also advised to follow up with family doctor or primary care provider after the antibiotic is completed for repeat testing as needed. The patient did verbalize understanding of discharge instructions and is agreeable to the treatment plan. The patient left ambulatory without any changes or concerns in stable condition.     PATIENT REFERRED TO:  Marva Barthel, APRN - CNP  [address] [place] [address]    Schedule an appointment as soon as possible for a visit   For re-check    DISCHARGE MEDICATIONS:  Discharge Medication List as of 8/5/2020  5:51 PM      START taking these medications    Details   metroNIDAZOLE (FLAGYL) 500 MG tablet Take 1 tablet by mouth 2 times daily for 7 days, Disp-14 tablet,R-0Normal      fluconazole (DIFLUCAN) 150 MG tablet Take 1 tablet by mouth once for 1 dose, Disp-1 tablet,R-0Normal      Probiotic Acidophilus (FLORANEX) TABS Take 1 tablet by mouth daily, Disp-30 tablet,R-0OTC           Discharge Medication List as of 8/5/2020  5:51 PM          PANTERA Montiel CNP, APRN - CNP  08/05/20 1938

## 2020-09-02 ENCOUNTER — HOSPITAL ENCOUNTER (OUTPATIENT)
Age: 45
Setting detail: SPECIMEN
Discharge: HOME | End: 2020-09-02
Payer: COMMERCIAL

## 2020-09-02 LAB
ABSOLUTE EOS #: 0.05 K/UL (ref 0–0.44)
ABSOLUTE IMMATURE GRANULOCYTE: <0.03 K/UL (ref 0–0.3)
ABSOLUTE LYMPH #: 2.04 K/UL (ref 1.1–3.7)
ABSOLUTE MONO #: 0.41 K/UL (ref 0.1–1.2)
ALBUMIN SERPL-MCNC: 3.6 G/DL (ref 3.5–5.2)
ALBUMIN/GLOBULIN RATIO: 1.7 (ref 1–2.5)
ALP BLD-CCNC: 77 U/L (ref 35–104)
ALT SERPL-CCNC: 30 U/L (ref 5–33)
ANION GAP SERPL CALCULATED.3IONS-SCNC: 10 MMOL/L (ref 9–17)
AST SERPL-CCNC: 24 U/L
BASOPHILS # BLD: 1 % (ref 0–2)
BASOPHILS ABSOLUTE: 0.03 K/UL (ref 0–0.2)
BILIRUB SERPL-MCNC: <0.1 MG/DL (ref 0.3–1.2)
BUN BLDV-MCNC: 12 MG/DL (ref 6–20)
BUN/CREAT BLD: ABNORMAL (ref 9–20)
CALCIUM SERPL-MCNC: 8.6 MG/DL (ref 8.6–10.4)
CARBAMAZEPINE DATE LAST DOSE: NORMAL
CARBAMAZEPINE DOSE AMOUNT: NORMAL
CARBAMAZEPINE DOSE TIME: NORMAL
CARBAMAZEPINE LEVEL: 5.8 UG/ML (ref 4–12)
CHLORIDE BLD-SCNC: 107 MMOL/L (ref 98–107)
CHOLESTEROL/HDL RATIO: 4.2
CHOLESTEROL: 207 MG/DL
CO2: 22 MMOL/L (ref 20–31)
CREAT SERPL-MCNC: 0.7 MG/DL (ref 0.5–0.9)
DIFFERENTIAL TYPE: NORMAL
EOSINOPHILS RELATIVE PERCENT: 1 % (ref 1–4)
GFR AFRICAN AMERICAN: >60 ML/MIN
GFR NON-AFRICAN AMERICAN: >60 ML/MIN
GFR SERPL CREATININE-BSD FRML MDRD: ABNORMAL ML/MIN/{1.73_M2}
GFR SERPL CREATININE-BSD FRML MDRD: ABNORMAL ML/MIN/{1.73_M2}
GLUCOSE BLD-MCNC: 98 MG/DL (ref 70–99)
HCT VFR BLD CALC: 40.8 % (ref 36.3–47.1)
HDLC SERPL-MCNC: 49 MG/DL
HEMOGLOBIN: 13.6 G/DL (ref 11.9–15.1)
IMMATURE GRANULOCYTES: 0 %
LDL CHOLESTEROL DIRECT: 117 MG/DL
LDL CHOLESTEROL: ABNORMAL MG/DL (ref 0–130)
LYMPHOCYTES # BLD: 37 % (ref 24–43)
MCH RBC QN AUTO: 31.6 PG (ref 25.2–33.5)
MCHC RBC AUTO-ENTMCNC: 33.3 G/DL (ref 28.4–34.8)
MCV RBC AUTO: 94.7 FL (ref 82.6–102.9)
MONOCYTES # BLD: 7 % (ref 3–12)
NRBC AUTOMATED: 0 PER 100 WBC
PDW BLD-RTO: 12.6 % (ref 11.8–14.4)
PLATELET # BLD: 230 K/UL (ref 138–453)
PLATELET ESTIMATE: NORMAL
PMV BLD AUTO: 10.3 FL (ref 8.1–13.5)
POTASSIUM SERPL-SCNC: 4.2 MMOL/L (ref 3.7–5.3)
RBC # BLD: 4.31 M/UL (ref 3.95–5.11)
RBC # BLD: NORMAL 10*6/UL
SEG NEUTROPHILS: 54 % (ref 36–65)
SEGMENTED NEUTROPHILS ABSOLUTE COUNT: 2.99 K/UL (ref 1.5–8.1)
SODIUM BLD-SCNC: 139 MMOL/L (ref 135–144)
TOTAL PROTEIN: 5.7 G/DL (ref 6.4–8.3)
TRIGL SERPL-MCNC: 417 MG/DL
TSH SERPL DL<=0.05 MIU/L-ACNC: 3.35 MIU/L (ref 0.3–5)
VITAMIN D 25-HYDROXY: 14.4 NG/ML (ref 30–100)
VLDLC SERPL CALC-MCNC: ABNORMAL MG/DL (ref 1–30)
WBC # BLD: 5.5 K/UL (ref 3.5–11.3)
WBC # BLD: NORMAL 10*3/UL

## 2020-09-03 LAB
ESTIMATED AVERAGE GLUCOSE: 108 MG/DL
HBA1C MFR BLD: 5.4 % (ref 4–6)

## 2020-12-07 ENCOUNTER — HOSPITAL ENCOUNTER (OUTPATIENT)
Age: 45
Setting detail: SPECIMEN
Discharge: HOME | End: 2020-12-07
Payer: COMMERCIAL

## 2020-12-09 LAB
ANTICARDIOLIPIN IGA ANTIBODY: 1.7 APU
ANTICARDIOLIPIN IGG ANTIBODY: 2.9 GPU
CARDIOLIPIN AB IGM: 6 MPU
DILUTE RUSSELL VIPER VENOM TIME: NORMAL
INR BLD: 0.9
LUPUS ANTICOAG: NORMAL
PARTIAL THROMBOPLASTIN TIME: 25.9 SEC (ref 20.5–30.5)
PROTHROMBIN TIME: 9.6 SEC (ref 9–12)

## 2020-12-14 LAB
PROTEIN C ACTIVITY: 146 %
PROTEIN S ACTIVITY: 92 % (ref 59–130)

## 2020-12-16 ENCOUNTER — HOSPITAL ENCOUNTER (OUTPATIENT)
Age: 45
Setting detail: SPECIMEN
Discharge: HOME | End: 2020-12-16
Payer: COMMERCIAL

## 2020-12-16 LAB
ALBUMIN SERPL-MCNC: 4 G/DL (ref 3.5–5.2)
ALBUMIN/GLOBULIN RATIO: 1.5 (ref 1–2.5)
ALP BLD-CCNC: 89 U/L (ref 35–104)
ALT SERPL-CCNC: 31 U/L (ref 5–33)
ANION GAP SERPL CALCULATED.3IONS-SCNC: 11 MMOL/L (ref 9–17)
AST SERPL-CCNC: 22 U/L
BILIRUB SERPL-MCNC: <0.1 MG/DL (ref 0.3–1.2)
BUN BLDV-MCNC: 16 MG/DL (ref 6–20)
BUN/CREAT BLD: ABNORMAL (ref 9–20)
CALCIUM SERPL-MCNC: 9.3 MG/DL (ref 8.6–10.4)
CHLORIDE BLD-SCNC: 104 MMOL/L (ref 98–107)
CHOLESTEROL/HDL RATIO: 2.4
CHOLESTEROL: 157 MG/DL
CO2: 27 MMOL/L (ref 20–31)
CREAT SERPL-MCNC: 0.7 MG/DL (ref 0.5–0.9)
GFR AFRICAN AMERICAN: >60 ML/MIN
GFR NON-AFRICAN AMERICAN: >60 ML/MIN
GFR SERPL CREATININE-BSD FRML MDRD: ABNORMAL ML/MIN/{1.73_M2}
GFR SERPL CREATININE-BSD FRML MDRD: ABNORMAL ML/MIN/{1.73_M2}
GLUCOSE BLD-MCNC: 116 MG/DL (ref 70–99)
HCT VFR BLD CALC: 39.7 % (ref 36.3–47.1)
HDLC SERPL-MCNC: 66 MG/DL
HEMOGLOBIN: 13.2 G/DL (ref 11.9–15.1)
LDL CHOLESTEROL: 27 MG/DL (ref 0–130)
MCH RBC QN AUTO: 32.4 PG (ref 25.2–33.5)
MCHC RBC AUTO-ENTMCNC: 33.2 G/DL (ref 28.4–34.8)
MCV RBC AUTO: 97.5 FL (ref 82.6–102.9)
NRBC AUTOMATED: 0 PER 100 WBC
PDW BLD-RTO: 13.1 % (ref 11.8–14.4)
PLATELET # BLD: 261 K/UL (ref 138–453)
PMV BLD AUTO: 10.5 FL (ref 8.1–13.5)
POTASSIUM SERPL-SCNC: 4.2 MMOL/L (ref 3.7–5.3)
RBC # BLD: 4.07 M/UL (ref 3.95–5.11)
SODIUM BLD-SCNC: 142 MMOL/L (ref 135–144)
TOTAL PROTEIN: 6.7 G/DL (ref 6.4–8.3)
TRIGL SERPL-MCNC: 319 MG/DL
TSH SERPL DL<=0.05 MIU/L-ACNC: 2.97 MIU/L (ref 0.3–5)
VLDLC SERPL CALC-MCNC: ABNORMAL MG/DL (ref 1–30)
WBC # BLD: 6.6 K/UL (ref 3.5–11.3)

## 2021-03-12 ENCOUNTER — HOSPITAL ENCOUNTER (OUTPATIENT)
Age: 46
Setting detail: SPECIMEN
Discharge: HOME | End: 2021-03-12
Payer: COMMERCIAL

## 2021-03-13 LAB
CAMPYLOBACTER PCR: NORMAL
E COLI ENTEROTOXIGENIC PCR: NORMAL
PLESIOMONAS SHIGELLOIDES PCR: NORMAL
SALMONELLA PCR: NORMAL
SHIGATOXIN GENE PCR: NORMAL
SHIGELLA SP PCR: NORMAL
SPECIMEN DESCRIPTION: NORMAL
VIBRIO PCR: NORMAL
YERSINIA ENTEROCOLITICA PCR: NORMAL

## 2021-03-15 LAB
DIRECT EXAM: NORMAL
Lab: NORMAL
SPECIMEN DESCRIPTION: NORMAL

## 2021-04-05 ENCOUNTER — APPOINTMENT (OUTPATIENT)
Dept: GENERAL RADIOLOGY | Age: 46
End: 2021-04-05
Payer: COMMERCIAL

## 2021-04-05 ENCOUNTER — APPOINTMENT (OUTPATIENT)
Dept: CT IMAGING | Age: 46
End: 2021-04-05
Payer: COMMERCIAL

## 2021-04-05 ENCOUNTER — HOSPITAL ENCOUNTER (OUTPATIENT)
Age: 46
Setting detail: OBSERVATION
Discharge: HOME | End: 2021-04-08
Attending: FAMILY MEDICINE | Admitting: FAMILY MEDICINE
Payer: COMMERCIAL

## 2021-04-05 DIAGNOSIS — F19.959: ICD-10-CM

## 2021-04-05 DIAGNOSIS — R20.2 FACIAL PARESTHESIA: ICD-10-CM

## 2021-04-05 DIAGNOSIS — R53.1 WEAKNESS OF RIGHT SIDE OF BODY: Primary | ICD-10-CM

## 2021-04-05 PROBLEM — R29.90 STROKE-LIKE SYMPTOMS: Status: ACTIVE | Noted: 2021-04-05

## 2021-04-05 LAB
ACETAMINOPHEN LEVEL: < 5 UG/ML (ref 0–20)
ALBUMIN SERPL-MCNC: 3.7 G/DL (ref 3.5–5.1)
ALP BLD-CCNC: 83 U/L (ref 38–126)
ALT SERPL-CCNC: 32 U/L (ref 11–66)
AMPHETAMINE+METHAMPHETAMINE URINE SCREEN: NEGATIVE
ANION GAP SERPL CALCULATED.3IONS-SCNC: 10 MEQ/L (ref 8–16)
AST SERPL-CCNC: 21 U/L (ref 5–40)
ATYPICAL LYMPHOCYTES: ABNORMAL %
BARBITURATE QUANTITATIVE URINE: NEGATIVE
BASOPHILS # BLD: 0.6 %
BASOPHILS ABSOLUTE: 0 THOU/MM3 (ref 0–0.1)
BENZODIAZEPINE QUANTITATIVE URINE: NEGATIVE
BILIRUB SERPL-MCNC: < 0.2 MG/DL (ref 0.3–1.2)
BILIRUBIN URINE: NEGATIVE
BLOOD, URINE: NEGATIVE
BUN BLDV-MCNC: 17 MG/DL (ref 7–22)
CALCIUM SERPL-MCNC: 8.7 MG/DL (ref 8.5–10.5)
CANNABINOID QUANTITATIVE URINE: NEGATIVE
CHARACTER, URINE: CLEAR
CHLORIDE BLD-SCNC: 107 MEQ/L (ref 98–111)
CO2: 24 MEQ/L (ref 23–33)
COCAINE METABOLITE QUANTITATIVE URINE: POSITIVE
COLOR: YELLOW
CREAT SERPL-MCNC: 0.6 MG/DL (ref 0.4–1.2)
EKG ATRIAL RATE: 67 BPM
EKG P AXIS: 57 DEGREES
EKG P-R INTERVAL: 164 MS
EKG Q-T INTERVAL: 460 MS
EKG QRS DURATION: 84 MS
EKG QTC CALCULATION (BAZETT): 486 MS
EKG R AXIS: 23 DEGREES
EKG T AXIS: 47 DEGREES
EKG VENTRICULAR RATE: 67 BPM
EOSINOPHIL # BLD: 0.8 %
EOSINOPHILS ABSOLUTE: 0 THOU/MM3 (ref 0–0.4)
ERYTHROCYTE [DISTWIDTH] IN BLOOD BY AUTOMATED COUNT: 13.4 % (ref 11.5–14.5)
ERYTHROCYTE [DISTWIDTH] IN BLOOD BY AUTOMATED COUNT: 47.3 FL (ref 35–45)
ETHYL ALCOHOL, SERUM: < 0.01 %
GFR SERPL CREATININE-BSD FRML MDRD: > 90 ML/MIN/1.73M2
GLUCOSE BLD-MCNC: 102 MG/DL (ref 70–108)
GLUCOSE BLD-MCNC: 102 MG/DL (ref 70–108)
GLUCOSE URINE: NEGATIVE MG/DL
HCT VFR BLD CALC: 40.2 % (ref 37–47)
HEMOGLOBIN: 13.1 GM/DL (ref 12–16)
IMMATURE GRANS (ABS): 0.01 THOU/MM3 (ref 0–0.07)
IMMATURE GRANULOCYTES: 0.2 %
KETONES, URINE: NEGATIVE
LEUKOCYTE ESTERASE, URINE: NEGATIVE
LYMPHOCYTES # BLD: 36.4 %
LYMPHOCYTES ABSOLUTE: 2.3 THOU/MM3 (ref 1–4.8)
MCH RBC QN AUTO: 31.3 PG (ref 26–33)
MCHC RBC AUTO-ENTMCNC: 32.6 GM/DL (ref 32.2–35.5)
MCV RBC AUTO: 95.9 FL (ref 81–99)
MONOCYTES # BLD: 9.5 %
MONOCYTES ABSOLUTE: 0.6 THOU/MM3 (ref 0.4–1.3)
NITRITE, URINE: NEGATIVE
NUCLEATED RED BLOOD CELLS: 0 /100 WBC
OPIATES, URINE: NEGATIVE
OSMOLALITY CALCULATION: 283 MOSMOL/KG (ref 275–300)
OXYCODONE: NEGATIVE
PH UA: 7 (ref 5–9)
PHENCYCLIDINE QUANTITATIVE URINE: NEGATIVE
PLATELET # BLD: 228 THOU/MM3 (ref 130–400)
PLATELET ESTIMATE: ADEQUATE
PMV BLD AUTO: 9.4 FL (ref 9.4–12.4)
POTASSIUM SERPL-SCNC: 3.9 MEQ/L (ref 3.5–5.2)
PREGNANCY, SERUM: NEGATIVE
PROTEIN UA: NEGATIVE
RBC # BLD: 4.19 MILL/MM3 (ref 4.2–5.4)
SALICYLATE, SERUM: < 0.3 MG/DL (ref 2–10)
SCAN OF BLOOD SMEAR: NORMAL
SEG NEUTROPHILS: 52.5 %
SEGMENTED NEUTROPHILS ABSOLUTE COUNT: 3.3 THOU/MM3 (ref 1.8–7.7)
SODIUM BLD-SCNC: 141 MEQ/L (ref 135–145)
SPECIFIC GRAVITY, URINE: 1.03 (ref 1–1.03)
TOTAL PROTEIN: 6.4 G/DL (ref 6.1–8)
TROPONIN T: < 0.01 NG/ML
TSH SERPL DL<=0.05 MIU/L-ACNC: 3.16 UIU/ML (ref 0.4–4.2)
UROBILINOGEN, URINE: 0.2 EU/DL (ref 0–1)
WBC # BLD: 6.2 THOU/MM3 (ref 4.8–10.8)

## 2021-04-05 PROCEDURE — 84443 ASSAY THYROID STIM HORMONE: CPT

## 2021-04-05 PROCEDURE — 71045 X-RAY EXAM CHEST 1 VIEW: CPT

## 2021-04-05 PROCEDURE — 70450 CT HEAD/BRAIN W/O DYE: CPT

## 2021-04-05 PROCEDURE — 84484 ASSAY OF TROPONIN QUANT: CPT

## 2021-04-05 PROCEDURE — 96365 THER/PROPH/DIAG IV INF INIT: CPT

## 2021-04-05 PROCEDURE — 80179 DRUG ASSAY SALICYLATE: CPT

## 2021-04-05 PROCEDURE — 99448 NTRPROF PH1/NTRNET/EHR 21-30: CPT | Performed by: PSYCHIATRY & NEUROLOGY

## 2021-04-05 PROCEDURE — 93005 ELECTROCARDIOGRAM TRACING: CPT | Performed by: FAMILY MEDICINE

## 2021-04-05 PROCEDURE — 85025 COMPLETE CBC W/AUTO DIFF WBC: CPT

## 2021-04-05 PROCEDURE — 6370000000 HC RX 637 (ALT 250 FOR IP): Performed by: FAMILY MEDICINE

## 2021-04-05 PROCEDURE — 96366 THER/PROPH/DIAG IV INF ADDON: CPT

## 2021-04-05 PROCEDURE — G0378 HOSPITAL OBSERVATION PER HR: HCPCS

## 2021-04-05 PROCEDURE — 99282 EMERGENCY DEPT VISIT SF MDM: CPT

## 2021-04-05 PROCEDURE — 82077 ASSAY SPEC XCP UR&BREATH IA: CPT

## 2021-04-05 PROCEDURE — 2580000003 HC RX 258: Performed by: FAMILY MEDICINE

## 2021-04-05 PROCEDURE — 99220 PR INITIAL OBSERVATION CARE/DAY 70 MINUTES: CPT | Performed by: FAMILY MEDICINE

## 2021-04-05 PROCEDURE — 84703 CHORIONIC GONADOTROPIN ASSAY: CPT

## 2021-04-05 PROCEDURE — 82948 REAGENT STRIP/BLOOD GLUCOSE: CPT

## 2021-04-05 PROCEDURE — 81003 URINALYSIS AUTO W/O SCOPE: CPT

## 2021-04-05 PROCEDURE — 80053 COMPREHEN METABOLIC PANEL: CPT

## 2021-04-05 PROCEDURE — 6360000002 HC RX W HCPCS: Performed by: FAMILY MEDICINE

## 2021-04-05 PROCEDURE — 80307 DRUG TEST PRSMV CHEM ANLYZR: CPT

## 2021-04-05 PROCEDURE — 36415 COLL VENOUS BLD VENIPUNCTURE: CPT

## 2021-04-05 PROCEDURE — 80143 DRUG ASSAY ACETAMINOPHEN: CPT

## 2021-04-05 PROCEDURE — 2580000003 HC RX 258: Performed by: PHYSICIAN ASSISTANT

## 2021-04-05 PROCEDURE — G0378 HOSPITAL OBSERVATION PER HR: HCPCS | Performed by: HOSPITALIST

## 2021-04-05 RX ORDER — CARBAMAZEPINE 200 MG/1
400 TABLET ORAL 3 TIMES DAILY
Status: DISCONTINUED | OUTPATIENT
Start: 2021-04-05 | End: 2021-04-05

## 2021-04-05 RX ORDER — ASPIRIN 81 MG/1
81 TABLET, CHEWABLE ORAL DAILY
Status: DISCONTINUED | OUTPATIENT
Start: 2021-04-06 | End: 2021-04-08 | Stop reason: HOSPADM

## 2021-04-05 RX ORDER — HYDROXYZINE PAMOATE 25 MG/1
50 CAPSULE ORAL 3 TIMES DAILY PRN
Status: ON HOLD | COMMUNITY
End: 2021-09-06

## 2021-04-05 RX ORDER — M-VIT,TX,IRON,MINS/CALC/FOLIC 27MG-0.4MG
1 TABLET ORAL DAILY
Status: ON HOLD | COMMUNITY
End: 2021-09-06

## 2021-04-05 RX ORDER — ACETAMINOPHEN 325 MG/1
650 TABLET ORAL EVERY 6 HOURS PRN
Status: DISCONTINUED | OUTPATIENT
Start: 2021-04-05 | End: 2021-04-08 | Stop reason: HOSPADM

## 2021-04-05 RX ORDER — CARBAMAZEPINE 200 MG/1
400 TABLET, EXTENDED RELEASE ORAL 2 TIMES DAILY
Status: DISCONTINUED | OUTPATIENT
Start: 2021-04-05 | End: 2021-04-08 | Stop reason: HOSPADM

## 2021-04-05 RX ORDER — BUSPIRONE HYDROCHLORIDE 30 MG/1
30 TABLET ORAL 2 TIMES DAILY
Status: ON HOLD | COMMUNITY
End: 2021-04-08 | Stop reason: HOSPADM

## 2021-04-05 RX ORDER — 0.9 % SODIUM CHLORIDE 0.9 %
1000 INTRAVENOUS SOLUTION INTRAVENOUS ONCE
Status: COMPLETED | OUTPATIENT
Start: 2021-04-05 | End: 2021-04-05

## 2021-04-05 RX ORDER — ACETAMINOPHEN 650 MG/1
650 SUPPOSITORY RECTAL EVERY 6 HOURS PRN
Status: DISCONTINUED | OUTPATIENT
Start: 2021-04-05 | End: 2021-04-08 | Stop reason: HOSPADM

## 2021-04-05 RX ORDER — ASPIRIN 81 MG/1
81 TABLET, CHEWABLE ORAL DAILY
COMMUNITY
End: 2022-02-05

## 2021-04-05 RX ORDER — SODIUM CHLORIDE 9 MG/ML
INJECTION, SOLUTION INTRAVENOUS CONTINUOUS
Status: DISCONTINUED | OUTPATIENT
Start: 2021-04-05 | End: 2021-04-07

## 2021-04-05 RX ORDER — DIMENHYDRINATE 50 MG
1000 TABLET ORAL DAILY
Status: ON HOLD | COMMUNITY
End: 2021-09-06

## 2021-04-05 RX ORDER — CARBAMAZEPINE 400 MG/1
400 TABLET, EXTENDED RELEASE ORAL 2 TIMES DAILY
COMMUNITY
End: 2022-02-05

## 2021-04-05 RX ORDER — BUSPIRONE HYDROCHLORIDE 7.5 MG/1
30 TABLET ORAL 2 TIMES DAILY
Status: DISCONTINUED | OUTPATIENT
Start: 2021-04-05 | End: 2021-04-08 | Stop reason: HOSPADM

## 2021-04-05 RX ORDER — SERTRALINE HYDROCHLORIDE 100 MG/1
200 TABLET, FILM COATED ORAL DAILY
Status: DISCONTINUED | OUTPATIENT
Start: 2021-04-06 | End: 2021-04-08 | Stop reason: HOSPADM

## 2021-04-05 RX ORDER — SODIUM CHLORIDE 0.9 % (FLUSH) 0.9 %
10 SYRINGE (ML) INJECTION EVERY 12 HOURS SCHEDULED
Status: DISCONTINUED | OUTPATIENT
Start: 2021-04-05 | End: 2021-04-08 | Stop reason: HOSPADM

## 2021-04-05 RX ORDER — SODIUM CHLORIDE 0.9 % (FLUSH) 0.9 %
10 SYRINGE (ML) INJECTION PRN
Status: DISCONTINUED | OUTPATIENT
Start: 2021-04-05 | End: 2021-04-08 | Stop reason: HOSPADM

## 2021-04-05 RX ORDER — ASPIRIN 81 MG/1
324 TABLET, CHEWABLE ORAL ONCE
Status: COMPLETED | OUTPATIENT
Start: 2021-04-05 | End: 2021-04-05

## 2021-04-05 RX ORDER — TRAZODONE HYDROCHLORIDE 100 MG/1
100 TABLET ORAL NIGHTLY
Status: DISCONTINUED | OUTPATIENT
Start: 2021-04-05 | End: 2021-04-05 | Stop reason: ALTCHOICE

## 2021-04-05 RX ORDER — HYDROXYZINE PAMOATE 50 MG/1
50 CAPSULE ORAL 3 TIMES DAILY
Status: DISCONTINUED | OUTPATIENT
Start: 2021-04-05 | End: 2021-04-08 | Stop reason: HOSPADM

## 2021-04-05 RX ORDER — ATORVASTATIN CALCIUM 10 MG/1
10 TABLET, FILM COATED ORAL DAILY
Status: ON HOLD | COMMUNITY
End: 2021-09-06

## 2021-04-05 RX ORDER — ALBUTEROL SULFATE 90 UG/1
2 AEROSOL, METERED RESPIRATORY (INHALATION) EVERY 6 HOURS PRN
COMMUNITY
End: 2022-02-05

## 2021-04-05 RX ORDER — SODIUM CHLORIDE 9 MG/ML
25 INJECTION, SOLUTION INTRAVENOUS PRN
Status: DISCONTINUED | OUTPATIENT
Start: 2021-04-05 | End: 2021-04-08 | Stop reason: HOSPADM

## 2021-04-05 RX ADMIN — ASPIRIN 324 MG: 81 TABLET, CHEWABLE ORAL at 21:00

## 2021-04-05 RX ADMIN — SODIUM CHLORIDE: 9 INJECTION, SOLUTION INTRAVENOUS at 21:32

## 2021-04-05 RX ADMIN — SODIUM CHLORIDE 1000 ML: 9 INJECTION, SOLUTION INTRAVENOUS at 20:14

## 2021-04-05 RX ADMIN — BUSPIRONE HYDROCHLORIDE 30 MG: 7.5 TABLET ORAL at 23:25

## 2021-04-05 RX ADMIN — MAGNESIUM SULFATE HEPTAHYDRATE 1000 MG: 500 INJECTION, SOLUTION INTRAMUSCULAR; INTRAVENOUS at 21:33

## 2021-04-05 RX ADMIN — HYDROXYZINE PAMOATE 50 MG: 50 CAPSULE ORAL at 23:25

## 2021-04-05 ASSESSMENT — ENCOUNTER SYMPTOMS
ABDOMINAL PAIN: 0
VOMITING: 0
COUGH: 0
PHOTOPHOBIA: 0
SORE THROAT: 0
DIARRHEA: 0
SHORTNESS OF BREATH: 0
RHINORRHEA: 0
NAUSEA: 0

## 2021-04-05 NOTE — ED NOTES
This RN spoke with Marbella Jaffe who has consulted neurology. She will discuss plan of care with patient. Will continue to monitor.      Gerald Boland RN  04/05/21 1943

## 2021-04-05 NOTE — ED PROVIDER NOTES
disorder ([facility] [address] COPD (chronic obstructive pulmonary disease) ([facility] [address] Depression, and PTSD (post-traumatic stress disorder). SURGICAL HISTORY      has a past surgical history that includes Tubal ligation. CURRENT MEDICATIONS       Previous Medications    BUSPIRONE (BUSPAR) 15 MG TABLET    Take 15 mg by mouth 3 times daily    CARBAMAZEPINE (TEGRETOL) 200 MG TABLET    Take 400 mg by mouth 3 times daily     HYDROXYZINE (VISTARIL) 25 MG CAPSULE    Take 25 mg by mouth 3 times daily as needed for Itching    IBUPROFEN (IBU) 800 MG TABLET    Take 1 tablet by mouth every 6 hours as needed for Pain Take with food. SERTRALINE (ZOLOFT) 100 MG TABLET    Take 200 mg by mouth daily    TRAZODONE (DESYREL) 100 MG TABLET    Take 100 mg by mouth nightly       ALLERGIES     is allergic to penicillins. FAMILY HISTORY     She indicated that her mother is alive. She indicated that her father is alive. family history includes Asthma in her mother; COPD in her mother. SOCIAL HISTORY    reports that she has been smoking cigarettes. She has a 10.00 pack-year smoking history. She has never used smokeless tobacco. She reports that she does not drink alcohol or use drugs. PHYSICAL EXAM     INITIAL VITALS:  oral temperature is 98.5 °F (36.9 °C). Her blood pressure is 126/80 and her pulse is 65. Her respiration is 16 and oxygen saturation is 97%. Physical Exam  Vitals signs and nursing note reviewed. Constitutional:       General: She is not in acute distress. Appearance: Normal appearance. She is well-developed. She is not toxic-appearing. HENT:      Head: Normocephalic and atraumatic. Right Ear: Hearing, tympanic membrane and ear canal normal. No hemotympanum. Left Ear: Hearing, tympanic membrane and ear canal normal. No hemotympanum. Nose: Nose normal.      Mouth/Throat:      Pharynx: Uvula midline.    Eyes:      General: Lids are normal.      Conjunctiva/sclera: Conjunctivae normal.      Pupils: Pupils are equal, round, and reactive to light. Neck:      Musculoskeletal: Normal range of motion and neck supple. No neck rigidity. Vascular: No JVD. Trachea: Trachea normal. No tracheal deviation. Cardiovascular:      Rate and Rhythm: Normal rate and regular rhythm. Heart sounds: Normal heart sounds. No murmur. Pulmonary:      Effort: Pulmonary effort is normal.      Breath sounds: Normal breath sounds. Abdominal:      General: There is no distension. Palpations: Abdomen is soft. Abdomen is not rigid. Tenderness: There is no abdominal tenderness. There is no guarding. Musculoskeletal: Normal range of motion. Comments: Extremities well perfused; good strength appreciated in all muscle groups. No signs of DVT. Lymphadenopathy:      Cervical: No cervical adenopathy. Skin:     General: Skin is warm and dry. Findings: No rash. Neurological:      Mental Status: She is alert and oriented to person, place, and time. GCS: GCS eye subscore is 4. GCS verbal subscore is 5. GCS motor subscore is 6. Cranial Nerves: Cranial nerves are intact. No cranial nerve deficit. Sensory: No sensory deficit. Motor: Weakness and pronator drift present. Coordination: Coordination is intact. Gait: Gait is intact. Comments: Cranial nerves II through XII are normal as tested. Cerebellar tests are normal as tested. She demonstrates weakness in her right hand and expresses decreased sensation on the right side of her face. [facility]:         Speech: Speech normal.         Behavior: Behavior normal. Behavior is cooperative. Thought Content: Thought content normal.         Judgment: Judgment normal.       NIH Stroke Scale  NIH Stroke Scale Assessed: Yes  Interval: Baseline  Level of Consciousness (1a. ): Alert  LOC Questions (1b. ):  Answers both correctly  LOC Commands (1c. ): Performs both tasks correctly  Best Gaze (2. ): Normal  Visual (3. ): No visual loss  Facial Palsy (4. ): (!) Minor paralysis(no paralysis with passive assessment)  Motor Arm, Left (5a. ): No drift  Motor Arm, Right (5b. ): Some effort against gravity  Motor Leg, Left (6a. ): No drift  Motor Leg, Right (6b. ): Some effort against gravity  Limb Ataxia (7. ): Absent  Sensory (8. ): (!) Mild to Moderate  Best Language (9. ): No aphasia  Dysarthria (10. ): Normal      DIFFERENTIAL DIAGNOSIS:   Including but not limited to: CVA, paresthesia, metabolic derangement, ICH    DIAGNOSTIC RESULTS     EKG: All EKG's are interpreted by the[facility] Physician who either signs or Co-signs this chart in the absence of a cardiologist.  Ventricular rate 67 bpm  MT interval 164 ms  QRS duration 84 ms  QTc interval 486 ms  P-R-T axes 57, 23, 47  Normal sinus rhythm with PVCs. No STEMI    RADIOLOGY: non-plain film images(s) such as CT,Ultrasound and MRI are read by the radiologist.  Plain radiographic images are visualized and preliminarily interpreted by the emergency physician unless otherwise stated below. XR CHEST PORTABLE   Final Result      No acute intrathoracic process. **This report has been created using voice recognition software. It may contain minor errors which are inherent in voice recognition technology. **      Final report electronically signed by Dr. Lindsay Regalado on 4/5/2021 7:16 PM      CT HEAD WO CONTRAST (CODE STROKE)   Final Result      No mass effect or acute hemorrhage. **This report has been created using voice recognition software. It may contain minor errors which are inherent in voice recognition technology. **      Final report electronically signed by Dr. Lindsay Regalado on 4/5/2021 6:28 PM      MRI INTERPRETATION OF OUTSIDE IMAGES    (Results Pending)       LABS:   Labs Reviewed   CBC WITH AUTO DIFFERENTIAL - Abnormal; Notable for the following components:       Result Value    RBC 4.19 (*)     RDW-SD 47.3 (*)     All other components within normal limits   COMPREHENSIVE METABOLIC PANEL - Abnormal; Notable for the following components: Total Bilirubin <0.2 (*)     All other components within normal limits   SALICYLATE LEVEL - Abnormal; Notable for the following components:    Salicylate, Serum < 0.3 (*)     All other components within normal limits   TROPONIN   HCG, SERUM, QUALITATIVE   ACETAMINOPHEN LEVEL   ETHANOL   TSH WITH REFLEX   ANION GAP   GLOMERULAR FILTRATION RATE, ESTIMATED   OSMOLALITY   SCAN OF BLOOD SMEAR   URINE RT REFLEX TO CULTURE   URINE DRUG SCREEN   POCT GLUCOSE       EMERGENCY DEPARTMENT COURSE:   Vitals:    Vitals:    04/05/21 1830 04/05/21 1930   BP: 115/78 126/80   Pulse: 67 65   Resp: 17 16   Temp: 98.5 °F (36.9 °C)    TempSrc: Oral    SpO2: 98% 97%     1900: Records from [facility] reviewed patient had negative noncontrasted CT of the head and negative MRI/MRA of the brain. Telestroke Dr. Tiffanie Mendoza was consulted and advised discharge. Patient declined TPA which was offered. 1945: Consulted Dr. Kaitlin Grant who advised giving 1 L of normal saline then at 75 cc/h, admit observation with neuro consult, administer aspirin 325 mg, 1 g of magnesium sulfate and he will review MRI/MRA images. 2011: Consulted Dr. Wendy Hoffman in regards to admission    MDM:  The patient was seen by me in the emergency room for right-sided weakness and paresthesias. Physical exam revealed decreased sensation on the right side with some difficulty of speech. Right upper extremity was appreciated weaker than the left. Neurologically she was otherwise intact. Vital signs reviewed and noted stable. Old records were reviewed. Appropriate laboratory and imaging studies were ordered and reviewed upon completion.     Pertinent findings: CT head was normal    Interventions: Normal saline, aspirin, magnesium sulfate    Reexamination: The patient remained grossly neurologically intact with no complaints other than continued right-sided paresthesia and weakness    Results were discussed with the patient  as well as desire for admission and they were amenable. Dr. Zackary Cunningham of our hospitalists' service was consulted and graciously accepted admission. The patient was admitted to the hospital in fair condition. CRITICAL CARE:   None    CONSULTS:  Dr. Marta Yuen (interventional radiology)  Dr. Zackary Cunningham (hospitalist)    PROCEDURES:  None    FINAL IMPRESSION      1. Weakness of right side of body    2. Facial paresthesia          DISPOSITION/PLAN     1. Weakness of right side of body    2.  Facial paresthesia    Admission    (Please note that portions of this note were completed with a voice recognition program.  Efforts were made to edit the dictations but occasionally words are mis-transcribed.)    Loraine Vilchis, KYLEE 04/05/21 8:12 PM    Loraine Vilchis, KYLEE Vilchis, KYLEE  04/05/21 0179

## 2021-04-05 NOTE — ED NOTES
Patient resting on cart in stable condition, denies any needs. No distress noted.      Kavita Guaman, RN  04/05/21 1936

## 2021-04-05 NOTE — ED NOTES
Patient taken directly to CT from the front. Patient arrives to room 15 via wheelchair. Patient is able to move herself over. States she was at [facility] right before this for the same thing. States \"they basically told me I was crazy\". Patient then states, \"look\" and points to her face. Minimal right side facial droop noted. However during passive assessment for checking patients oral temperature, no facial drop is noted. Patient also states she cannot move her right arm. Noted during passive assessment while removing patients shirt, she has movement to the right shoulder and right elbow. Glucose check is 102. Scattered bruising to lower abdomen. Patient states she is a  and gets bruises from standing against her station.       Shmuel Morataya RN  04/05/21 2352

## 2021-04-05 NOTE — VIRTUAL HEALTH
[facility] V[facility] Neurology Consult for  [facility] Stroke Alert through [address] @ 7:30 pm  4/5/2021 7:41 PM  Pt Name: Denisa Merchant  MRN: [id number]  YOB: 1975  Date of evaluation: 4/5/2021  Primary Care Physician: PANTERA Fajardo CNP  Reason for Evaluation: Stroke evaluation with Phone Consult, Discussion and Review of imaging    Denisa Merchant is a 54 y.o. female  has a past medical history of Anxiety, Asthma, Bipolar 1 disorder ([facility] [address] COPD (chronic obstructive pulmonary disease) ([facility] [address] Depression, and PTSD (post-traumatic stress disorder). Patient presented with right-sided numbness/weakness. Last known well at 12 PM today. She initially presented to outside hospital.  According to report she underwent CT head and MRI brain. She was offered TPA but she declined. She presented here for similar symptoms. CT head was obtained and reviewed. No acute pathology noted. Glucose at 102. Blood pressure systolic at 982. Allergies  is allergic to penicillins. Medications  Prior to Admission medications    Medication Sig Start Date End Date Taking? Authorizing Provider   sertraline (ZOLOFT) 100 MG tablet Take 200 mg by mouth daily    Historical Provider, MD   hydrOXYzine (VISTARIL) 25 MG capsule Take 25 mg by mouth 3 times daily as needed for Itching    Historical Provider, MD   traZODone (DESYREL) 100 MG tablet Take 100 mg by mouth nightly    Historical Provider, MD   busPIRone (BUSPAR) 15 MG tablet Take 15 mg by mouth 3 times daily    Historical Provider, MD   ibuprofen (IBU) 800 MG tablet Take 1 tablet by mouth every 6 hours as needed for Pain Take with food.  8/4/18   PANTERA Young CNP   carBAMazepine (TEGRETOL) 200 MG tablet Take 400 mg by mouth 3 times daily     Historical Provider, MD    Scheduled Meds:  Continuous Infusions:  PRN Meds:.  Past Medical History   has a past medical history of Anxiety, Asthma, Bipolar 1 disorder

## 2021-04-06 ENCOUNTER — APPOINTMENT (OUTPATIENT)
Dept: MRI IMAGING | Age: 46
End: 2021-04-06
Payer: COMMERCIAL

## 2021-04-06 PROBLEM — R29.90 STROKE-LIKE SYMPTOMS: Status: RESOLVED | Noted: 2021-04-05 | Resolved: 2021-04-06

## 2021-04-06 PROBLEM — R53.1 RIGHT SIDED WEAKNESS: Status: ACTIVE | Noted: 2021-04-06

## 2021-04-06 LAB
ANION GAP SERPL CALCULATED.3IONS-SCNC: 9 MEQ/L (ref 8–16)
AVERAGE GLUCOSE: 108 MG/DL (ref 70–126)
BUN BLDV-MCNC: 13 MG/DL (ref 7–22)
CALCIUM SERPL-MCNC: 8.3 MG/DL (ref 8.5–10.5)
CHLORIDE BLD-SCNC: 108 MEQ/L (ref 98–111)
CHOLESTEROL, TOTAL: 142 MG/DL (ref 100–199)
CO2: 23 MEQ/L (ref 23–33)
CREAT SERPL-MCNC: 0.6 MG/DL (ref 0.4–1.2)
ERYTHROCYTE [DISTWIDTH] IN BLOOD BY AUTOMATED COUNT: 13.6 % (ref 11.5–14.5)
ERYTHROCYTE [DISTWIDTH] IN BLOOD BY AUTOMATED COUNT: 48.4 FL (ref 35–45)
GFR SERPL CREATININE-BSD FRML MDRD: > 90 ML/MIN/1.73M2
GLUCOSE BLD-MCNC: 143 MG/DL (ref 70–108)
HBA1C MFR BLD: 5.6 % (ref 4.4–6.4)
HCT VFR BLD CALC: 39.6 % (ref 37–47)
HDLC SERPL-MCNC: 47 MG/DL
HEMOGLOBIN: 12.7 GM/DL (ref 12–16)
LDL CHOLESTEROL CALCULATED: 66 MG/DL
MAGNESIUM: 2.2 MG/DL (ref 1.6–2.4)
MCH RBC QN AUTO: 31.2 PG (ref 26–33)
MCHC RBC AUTO-ENTMCNC: 32.1 GM/DL (ref 32.2–35.5)
MCV RBC AUTO: 97.3 FL (ref 81–99)
OSMOLALITY CALCULATION: 282 MOSMOL/KG (ref 275–300)
PHOSPHORUS: 2.8 MG/DL (ref 2.4–4.7)
PLATELET # BLD: 218 THOU/MM3 (ref 130–400)
PMV BLD AUTO: 9.7 FL (ref 9.4–12.4)
POTASSIUM SERPL-SCNC: 4.1 MEQ/L (ref 3.5–5.2)
RBC # BLD: 4.07 MILL/MM3 (ref 4.2–5.4)
SODIUM BLD-SCNC: 140 MEQ/L (ref 135–145)
TRIGL SERPL-MCNC: 145 MG/DL (ref 0–199)
WBC # BLD: 6.3 THOU/MM3 (ref 4.8–10.8)

## 2021-04-06 PROCEDURE — 6370000000 HC RX 637 (ALT 250 FOR IP): Performed by: FAMILY MEDICINE

## 2021-04-06 PROCEDURE — 80048 BASIC METABOLIC PNL TOTAL CA: CPT

## 2021-04-06 PROCEDURE — 6370000000 HC RX 637 (ALT 250 FOR IP): Performed by: PSYCHIATRY & NEUROLOGY

## 2021-04-06 PROCEDURE — 6360000002 HC RX W HCPCS: Performed by: PHYSICIAN ASSISTANT

## 2021-04-06 PROCEDURE — G0378 HOSPITAL OBSERVATION PER HR: HCPCS

## 2021-04-06 PROCEDURE — 83036 HEMOGLOBIN GLYCOSYLATED A1C: CPT

## 2021-04-06 PROCEDURE — 85027 COMPLETE CBC AUTOMATED: CPT

## 2021-04-06 PROCEDURE — 99222 1ST HOSP IP/OBS MODERATE 55: CPT | Performed by: PSYCHIATRY & NEUROLOGY

## 2021-04-06 PROCEDURE — 83735 ASSAY OF MAGNESIUM: CPT

## 2021-04-06 PROCEDURE — 72141 MRI NECK SPINE W/O DYE: CPT

## 2021-04-06 PROCEDURE — 84100 ASSAY OF PHOSPHORUS: CPT

## 2021-04-06 PROCEDURE — 3209999900 MRI INTERPRETATION OF OUTSIDE IMAGES

## 2021-04-06 PROCEDURE — 6360000002 HC RX W HCPCS: Performed by: FAMILY MEDICINE

## 2021-04-06 PROCEDURE — 99225 PR SBSQ OBSERVATION CARE/DAY 25 MINUTES: CPT | Performed by: PHYSICIAN ASSISTANT

## 2021-04-06 PROCEDURE — 96375 TX/PRO/DX INJ NEW DRUG ADDON: CPT

## 2021-04-06 PROCEDURE — 36415 COLL VENOUS BLD VENIPUNCTURE: CPT

## 2021-04-06 PROCEDURE — 80061 LIPID PANEL: CPT

## 2021-04-06 PROCEDURE — 2580000003 HC RX 258: Performed by: FAMILY MEDICINE

## 2021-04-06 PROCEDURE — 96372 THER/PROPH/DIAG INJ SC/IM: CPT

## 2021-04-06 RX ORDER — FUROSEMIDE 10 MG/ML
20 INJECTION INTRAMUSCULAR; INTRAVENOUS 2 TIMES DAILY
Status: COMPLETED | OUTPATIENT
Start: 2021-04-06 | End: 2021-04-07

## 2021-04-06 RX ADMIN — BUSPIRONE HYDROCHLORIDE 30 MG: 7.5 TABLET ORAL at 21:25

## 2021-04-06 RX ADMIN — ACETAMINOPHEN 650 MG: 325 TABLET ORAL at 11:44

## 2021-04-06 RX ADMIN — CARBAMAZEPINE 400 MG: 200 TABLET, EXTENDED RELEASE ORAL at 01:02

## 2021-04-06 RX ADMIN — CARIPRAZINE 1.5 MG: 1.5 CAPSULE, GELATIN COATED ORAL at 21:25

## 2021-04-06 RX ADMIN — ASPIRIN 81 MG: 81 TABLET, CHEWABLE ORAL at 09:08

## 2021-04-06 RX ADMIN — SERTRALINE 200 MG: 100 TABLET, FILM COATED ORAL at 09:08

## 2021-04-06 RX ADMIN — BUSPIRONE HYDROCHLORIDE 30 MG: 7.5 TABLET ORAL at 09:07

## 2021-04-06 RX ADMIN — SODIUM CHLORIDE, PRESERVATIVE FREE 10 ML: 5 INJECTION INTRAVENOUS at 18:18

## 2021-04-06 RX ADMIN — ENOXAPARIN SODIUM 40 MG: 40 INJECTION SUBCUTANEOUS at 09:08

## 2021-04-06 RX ADMIN — FUROSEMIDE 20 MG: 10 INJECTION, SOLUTION INTRAMUSCULAR; INTRAVENOUS at 18:18

## 2021-04-06 RX ADMIN — SODIUM CHLORIDE, PRESERVATIVE FREE 10 ML: 5 INJECTION INTRAVENOUS at 20:25

## 2021-04-06 RX ADMIN — HYDROXYZINE PAMOATE 50 MG: 50 CAPSULE ORAL at 09:08

## 2021-04-06 RX ADMIN — SODIUM CHLORIDE: 9 INJECTION, SOLUTION INTRAVENOUS at 12:49

## 2021-04-06 RX ADMIN — SODIUM CHLORIDE: 9 INJECTION, SOLUTION INTRAVENOUS at 05:09

## 2021-04-06 RX ADMIN — CARBAMAZEPINE 400 MG: 200 TABLET, EXTENDED RELEASE ORAL at 20:23

## 2021-04-06 RX ADMIN — CARBAMAZEPINE 400 MG: 200 TABLET, EXTENDED RELEASE ORAL at 09:08

## 2021-04-06 RX ADMIN — HYDROXYZINE PAMOATE 50 MG: 50 CAPSULE ORAL at 14:37

## 2021-04-06 RX ADMIN — HYDROXYZINE PAMOATE 50 MG: 50 CAPSULE ORAL at 20:23

## 2021-04-06 ASSESSMENT — PAIN DESCRIPTION - DESCRIPTORS
DESCRIPTORS: ACHING
DESCRIPTORS: TINGLING
DESCRIPTORS: TINGLING
DESCRIPTORS: NUMBNESS;TINGLING

## 2021-04-06 ASSESSMENT — PAIN DESCRIPTION - DIRECTION: RADIATING_TOWARDS: NO

## 2021-04-06 ASSESSMENT — PAIN SCALES - GENERAL
PAINLEVEL_OUTOF10: 4
PAINLEVEL_OUTOF10: 5
PAINLEVEL_OUTOF10: 5

## 2021-04-06 ASSESSMENT — PAIN DESCRIPTION - PROGRESSION
CLINICAL_PROGRESSION: NOT CHANGED

## 2021-04-06 ASSESSMENT — PAIN DESCRIPTION - ONSET
ONSET: AWAKENED FROM SLEEP
ONSET: ON-GOING
ONSET: ON-GOING
ONSET: PROGRESSIVE

## 2021-04-06 ASSESSMENT — PAIN - FUNCTIONAL ASSESSMENT
PAIN_FUNCTIONAL_ASSESSMENT: ACTIVITIES ARE NOT PREVENTED
PAIN_FUNCTIONAL_ASSESSMENT: PREVENTS OR INTERFERES SOME ACTIVE ACTIVITIES AND ADLS
PAIN_FUNCTIONAL_ASSESSMENT: ACTIVITIES ARE NOT PREVENTED

## 2021-04-06 ASSESSMENT — PAIN DESCRIPTION - LOCATION
LOCATION: ARM
LOCATION: BACK
LOCATION: ARM;HAND

## 2021-04-06 ASSESSMENT — PAIN DESCRIPTION - PAIN TYPE
TYPE: ACUTE PAIN
TYPE: ACUTE PAIN

## 2021-04-06 ASSESSMENT — PAIN DESCRIPTION - FREQUENCY: FREQUENCY: CONTINUOUS

## 2021-04-06 ASSESSMENT — PAIN DESCRIPTION - ORIENTATION: ORIENTATION: LOWER

## 2021-04-06 NOTE — PROGRESS NOTES
Pharmacy Medication History Note      List of current medications patient is taking is complete. Source of information: Patient, OHIP med list on Care Everywhere    Changes made to medication list:  Medications removed (include reason, ex. therapy complete or physician discontinued): Ibuprofen (Motrin) 800 mg PO Q6H PRN - patient states they no longer take  Trazodone (Desyrel) 100 mg PO nightly - patient states they no longer take    Medications added/doses adjusted: Added atorvastatin (Lipitor) 10 mg PO daily  Added aspirin 81 mg chewable PO daily  Added albuterol (Ventolin) 2 puffs Q6H PRN  Added multivitamin 1 tablet PO daily  Added ascorbic acid (Vitamin C) 500 mg chewable 3 tablets (1500 mg) PO daily  Added flaxseed oil 1000 mg PO daily  Adjusted carbamazepine ER (Tegretol XR) 400 mg PO twice daily  Adjusted hydroxyzine (Vistaril) 50 mg PO three times daily  Adjusted buspirone (Buspar) 30 mg PO twice daily    Other notes (ex. Recent course of antibiotics, Coumadin dosing):  None  Denies use of other OTC or herbal medications.       Allergies reviewed      Electronically signed by Gerber Olea on 4/5/2021 at 9:28 PM
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
## 2021-04-06 NOTE — ED NOTES
Patient resting in bed. Respirations easy and unlabored. No distress noted. Call light within reach.        Sol Gage RN  04/06/21 7180

## 2021-04-06 NOTE — ED NOTES
Patient resting in bed. Respirations easy and unlabored. No distress noted. Patient medicated per MAR. Call light within reach.        Sol Gage RN  04/05/21 4866

## 2021-04-06 NOTE — H&P
History & Physical       Patient: Catherine Cousin  YOB: 1975    MRN: [id number]     Acct: [de-identified]    PCP: PANTERA Ayers CNP    Date of Admission: 4/5/2021    Date of Service: Patient seen / examined on 04/05/21 and admitted to outpatient with expected LOS less than two midnights due to medical therapy. ASSESSMENT / PLAN:    Stroke-like symptoms  See detailed HPI below. NIH 2. Evaluated by telestroke provider -- Ddx complex migraine (no previous history), acute CVA vs functional/non-organic symptoms. TSH nml. Received 324 mg ASA, 1g IV Mg, IVF. Remains hemodynamically appropriate on RA. Admit under observation to stepdown unit. Routine neurologic checks. Up with assistance / fall precautions. Continue IVF, ASA 81 mg daily. MRI brain, MRA head/neck results requested from [facility]. Check A1C, lipid panel, EKG. Neurology consulted for additional recommendations. Permissive HTN overnight pending review of MRI results. Bipolar 1 disorder / PTSD / depression / anxiety  Zoloft, tegretol, buspar and vistaril resumed -- unclear if medication regimen could be contributing to presenting symptoms. Consider psychiatry consult pending neurologic workup and ongoing clinical course. Chronic tobacco abuse  Cessation counseling. Offer NRT pending stroke rule out. Former polysubstance abuse (cocaine, marijuana)  Denies recent use. UDS pending from ED. Encourage ongoing abstention. Asthma  Without exacerbation. Bronchodilator therapy as needed. Chief Complaint: numbness, weakness    History of Present Illness:  53 y/o R-handed F PMHx bipolar 1 disorder, PTSD, depression, anxiety, asthma, obesity, chronic tobacco abuse and former polysubstance abuse (cocaine, marijuana) -- presents to [facility] with chief complaints of numbness and weakness. History obtained from the patient.     Patient presents to [facility] ED with complaints of R-sided numbness/weakness / last known well 12p this afternoon / initially presented to [facility] -- per report, completed MRI imaging / was offered tPA but patient ?declined and was subsequently discharged. Patient states she felt as though no one was taking her seriously, and that her symptoms were attributed to psychiatric illness / states she was not given an option for admission to the hospital.  Proceeded to [facility] ED due to persistence of symptoms. Patient reports R-sided face, arm and leg numbness and weakness / also reports similar symptoms in the past (R-sided) with diagnosis of TIA. No recent illness, fevers/chills, headache, vision changes, confusion, dizziness, chest pain, palpitations or LOC. Denies previous history of migraines. No additional questions / concerns identified at this time. ED course: afebrile, hemodynamically appropriate on RA. Lab workup (CBC, CMP, TSH, troponin) unremarkable / . Renal function normal. EtOH neg. Acetaminophen / salicylate neg. UA/UDS ordered / collection pending. Patient was evaluated by on-call telestroke provider / Biju Felder 2 / Ddx: complex migraine, acute ischemic stroke, functional/non-organic symptoms / recommended observation to the hospital with inpatient neurology consult / requested uploads of MRI brain, MRA head/neck from [facility] (ordered) / 324 mg ASA, IVF hydration and 1g Mg (for possible complex migraine). Hospitalist service contacted for admission. Please see A&P for additional details.     Past Medical History:    Diagnosis Date    Anxiety     Asthma     Bipolar 1 disorder ([facility] [address]     COPD (chronic obstructive pulmonary disease) (HCC)     Depression     PTSD (post-traumatic stress disorder)    Morbid obesity    Past Surgical History:    Procedure Laterality Date    TUBAL LIGATION        Medications Prior to Admission:   ASA 81 mg daily  Buspar 30 mg BID  Tegretol  mg BID  Vistaril 50 mg BID  Zoloft 200 mg daily    Allergies:   Penicillins    Social History:  Socioeconomic History    Marital status: Single Tobacco Use    Smoking status: Current Every Day Smoker     Packs/day: 0.50     Years: 20.00     Pack years: 10.00     Types: Cigarettes    Smokeless tobacco: Never Used   Substance and Sexual Activity    Alcohol use: No    Drug use: Former (cocaine, marijuana)     Family History:   Problem Relation Age of Onset   [facility] Asthma Mother     COPD Mother      REVIEW OF SYSTEMS:  A 14-point ROS was obtained and negative, with the exception of pertinent positives as stated in the HPI. PHYSICAL EXAM:  Vitals:    04/05/21 1830 04/05/21 1930   BP: 115/78 126/80   Pulse: 67 65   Resp: 17 16   Temp: 98.5 °F (36.9 °C)    TempSrc: Oral    SpO2: 98% 97%     General appearance: Alert / well-appearing  female. Pleasant. Cooperative. NAD. HEENT: Normocephalic / atraumatic. PERRL. EOMI. Conjunctivae appear normal.  Neck: Supple. No JVD. Respiratory: Unlabored on RA. CTAB. Forced expiratory wheezes noted bilaterally. No rales / rhonchi. Cardiovascular: RRR. Normal S1/S2. No murmurs / rubs / gallops. Abdomen: Obese. Soft / non-tender / non-distended. BS present. Musculoskeletal: No cyanosis or edema. Skin: Warm / dry. No pallor / diaphoresis. Neurologic: A/O x 3. Speech normal. Answers questions appropriately. CN II-XII intact. Strength 4/5 to RUE/RLE, 5/5 LUE/LLE. Diminished sensation reported to R face (upper and lower)/RUE/RLE / intact to L-face/LUE/LLE. Patient not ambulated at bedside. Psychiatric: Depressed affect. Thought content / judgment / insight appear appropriate. Peripheral pulses: Normal bilaterally.     Labs:   Results for orders placed or performed during the hospital encounter of 04/05/21   CBC auto differential   Result Value Ref Range    WBC 6.2 4.8 - 10.8 thou/mm3    RBC 4.19 (L) 4.20 - 5.40 mill/mm3    Hemoglobin 13.1 12.0 - 16.0 gm/dl    Hematocrit 40.2 37.0 - 47.0 %    MCV 95.9 81.0 - 99.0 fL    MCH 31.3 26.0 - 33.0 pg    MCHC 32.6 32.2 - 35.5 gm/dl    RDW-CV 13.4 11.5 - 14.5 %    RDW-SD 47.3 (H) 35.0 - 45.0 fL    Platelets 372 045 - 329 thou/mm3    MPV 9.4 9.4 - 12.4 fL    Seg Neutrophils 52.5 %    Lymphocytes 36.4 %    Monocytes 9.5 %    Eosinophils 0.8 %    Basophils 0.6 %    Immature Granulocytes 0.2 %    Atypical Lymphocytes RARE %    Platelet Estimate ADEQUATE Adequate    Segs Absolute 3.3 1 - 7 thou/mm3    Lymphocytes Absolute 2.3 1.0 - 4.8 thou/mm3    Monocytes Absolute 0.6 0.4 - 1.3 thou/mm3    Eosinophils Absolute 0.0 0.0 - 0.4 thou/mm3    Basophils Absolute 0.0 0.0 - 0.1 thou/mm3    Immature Grans (Abs) 0.01 0.00 - 0.07 thou/mm3    nRBC 0 /100 wbc   Comprehensive Metabolic Panel   Result Value Ref Range    Glucose 102 70 - 108 mg/dL    CREATININE 0.6 0.4 - 1.2 mg/dL    BUN 17 7 - 22 mg/dL    Sodium 141 135 - 145 meq/L    Potassium 3.9 3.5 - 5.2 meq/L    Chloride 107 98 - 111 meq/L    CO2 24 23 - 33 meq/L    Calcium 8.7 8.5 - 10.5 mg/dL    AST 21 5 - 40 U/L    Alkaline Phosphatase 83 38 - 126 U/L    Total Protein 6.4 6.1 - 8.0 g/dL    Albumin 3.7 3.5 - 5.1 g/dL    Total Bilirubin <0.2 (L) 0.3 - 1.2 mg/dL    ALT 32 11 - 66 U/L   Troponin   Result Value Ref Range    Troponin T < 0.010 ng/ml   HCG Qualitative, Serum   Result Value Ref Range    Preg, Serum NEGATIVE NEGATIVE   Acetaminophen Level   Result Value Ref Range    Acetaminophen Level < 5.0 0.0 - 76.8 ug/mL   Salicylate   Result Value Ref Range    Salicylate, Serum < 0.3 (L) 2.0 - 10.0 mg/dL   Ethanol   Result Value Ref Range    ETHYL ALCOHOL, SERUM < 0.01 0.00 %   TSH with Reflex   Result Value Ref Range    TSH 3.160 0.400 - 4.200 uIU/mL   Anion Gap   Result Value Ref Range    Anion Gap 10.0 8.0 - 16.0 meq/L   Glomerular Filtration Rate, Estimated   Result Value Ref Range    Est, Glom Filt Rate >90 ml/min/1.73m2   Osmolality   Result Value Ref Range    Osmolality Calc 283.0 275.0 - 300.0 mOsmol/kg   Scan of Blood Smear   Result Value Ref Range    SCAN OF BLOOD SMEAR see below    POCT Glucose   Result Value Ref Range    POC Glucose 102

## 2021-04-06 NOTE — PLAN OF CARE
Problem: Pain:  Goal: Pain level will decrease  Description: Pain level will decrease  4/6/2021 1335 by Felipe Robledo RN  Outcome: Ongoing  Note: Pt rated pain 5/10 in her right arm. Pt described pain as tingling. Pt was given Tylenol per request and was repositioned. Pt was asleep with respirations of greater than 10 per minute in response to this. Will continue to assess and monitor. Problem: Pain:  Goal: Control of acute pain  Description: Control of acute pain  Outcome: Ongoing  Note: Pt rated pain 5/10 in her right arm. Pt described pain as tingling. Pt was given Tylenol per request and was repositioned. Pt was asleep with respirations of greater than 10 per minute in response to this. Will continue to assess and monitor. Problem: Pain:  Goal: Control of chronic pain  Description: Control of chronic pain  Outcome: Ongoing  Note: Pt rated pain 5/10 in her right arm. Pt described pain as tingling. Pt was given Tylenol per request and was repositioned. Pt was asleep with respirations of greater than 10 per minute in response to this. Will continue to assess and monitor. Problem: Falls - Risk of:  Goal: Will remain free from falls  Description: Will remain free from falls  4/6/2021 1335 by Felipe Robledo RN  Outcome: Ongoing  Note: Free from falls this shift, at this time. Call light and bedside table within reach. Bed alarm on. Bed wheels locked and bed in lowest position. Pt oriented to own abilities and is encouraged to use call light for needs. Problem: Falls - Risk of:  Goal: Absence of physical injury  Description: Absence of physical injury  4/6/2021 1335 by Felipe Robledo RN  Outcome: Ongoing  Note: Pt is absent of physical injury at this time. Will continue to assess and monitor. Problem: Skin Integrity/Risk  Goal: No skin breakdown during hospitalization  4/6/2021 1335 by Felipe Robledo RN  Outcome: Ongoing  Note: Pt has no skin breakdowns at this time.  Will continue to assess and monitor. Problem: Discharge Planning:  Goal: Discharged to appropriate level of care  Description: Discharged to appropriate level of care  4/6/2021 1335 by Helena Blum RN  Outcome: Ongoing  Note: Pt's discharge planning is pending depending on medical course. Problem: HEMODYNAMIC STATUS  Goal: Patient has stable vital signs and fluid balance  4/6/2021 1335 by Helena Blum RN  Outcome: Ongoing  Note:   Vitals:    04/06/21 0118 04/06/21 0256 04/06/21 0457 04/06/21 0845   BP: 118/77 113/72 124/80 113/63   Pulse: 68 70 77 77   Resp: 15 15 16 16   Temp:   97.9 °F (36.6 °C) 98.3 °F (36.8 °C)   TempSrc:   Oral Oral   SpO2: 97% 98% 97% 97%   Weight:   278 lb 9.6 oz (126.4 kg)    Height:   5' 4\" (1.626 m)         Problem: ACTIVITY INTOLERANCE/IMPAIRED MOBILITY  Goal: Mobility/activity is maintained at optimum level for patient  4/6/2021 1335 by Helena Blum RN  Outcome: Ongoing  Note: Pt is weak upon ambulation. Pt was encouraged to continue to ambulate throughout the day. Problem: COMMUNICATION IMPAIRMENT  Goal: Ability to express needs and understand communication  4/6/2021 1335 by Helena Blum RN  Outcome: Ongoing  Note: Pt is able to communicate with staff throughout the day. Will continue to assess and monitor.

## 2021-04-06 NOTE — ED NOTES
Bed: 030A  Expected date:   Expected time:   Means of arrival:   Comments:  IP hold        Bevelyn Phoenix, LPN  38/86/42 0147
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
## 2021-04-06 NOTE — PLAN OF CARE
Problem: Pain:  Goal: Pain level will decrease  Description: Pain level will decrease  Outcome: Ongoing  Note: Patient reports pain as a 5 on a 0-10 scale this shift. Patient's stated pain goal is no pain. Pain is acute and located in the lower back described as stabbing. Non-pharmacological pain interventions include rest and emotional support. Pharmacological pain interventions on board per physician's order. Problem: Falls - Risk of:  Goal: Will remain free from falls  Description: Will remain free from falls  Outcome: Ongoing  Note: Patient remains free from falls this shift. Fall precautions in place with bed/chair exit alarmed. Fall sign posted and fall armband in place. Nonskid footwear used with transferring. Educated patient to use call light when in need of staff assistance with transferring, ambulating, and other activities of daily living. Patient appropriately uses call light this shift. Goal: Absence of physical injury  Description: Absence of physical injury  Outcome: Ongoing  Note: Hourly rounding in place to anticipate patient needs, such as assistance with pain, toileting, or repositioning, in order to decrease risk for injuries such as falls. Problem: Skin Integrity/Risk  Goal: No skin breakdown during hospitalization  Outcome: Ongoing  Note: Patient's skin remains intact this shift with no new signs of impaired skin integrity noted. Patient turned and repositioned every 2 hours. Special mattress in place to decrease pressure on high risk areas. Problem: Discharge Planning:  Goal: Discharged to appropriate level of care  Description: Discharged to appropriate level of care  Outcome: Ongoing  Note: Patient is from home alone and would like to discharge there once medically stable.       Problem: HEMODYNAMIC STATUS  Goal: Patient has stable vital signs and fluid balance  Outcome: Ongoing  Note:   Vitals:    04/06/21 0457   BP: 124/80   Pulse: 77   Resp: 16   Temp: 97.9 °F (36.6 °C)   SpO2: 97%   . Problem: ACTIVITY INTOLERANCE/IMPAIRED MOBILITY  Goal: Mobility/activity is maintained at optimum level for patient  Outcome: Ongoing  Note: Patient able to ambulate with one person assist. Therapies to see. Problem: COMMUNICATION IMPAIRMENT  Goal: Ability to express needs and understand communication  Outcome: Ongoing  Note: Patient able to clearly communicate needs with no aphasia or dysarthria noted. Care plan reviewed with patient. Patient verbalized understanding of the plan of care and contributed to goal setting.

## 2021-04-06 NOTE — ED NOTES
ED to inpatient nurses report    Chief Complaint   Patient presents with    Extremity Weakness    Speech Problem    Arm Pain      Present to ED from home  LOC: alert and orientated to name, place, date  Vital signs   Vitals:    04/05/21 1830 04/05/21 1930   BP: 115/78 126/80   Pulse: 67 65   Resp: 17 16   Temp: 98.5 °F (36.9 °C)    TempSrc: Oral    SpO2: 98% 97%      Oxygen Baseline RA    Current needs required RA Bipap/Cpap No  LDAs: 20G L hand  Mobility: Fully dependent  Pending ED orders: Magnesium  Present condition: Stable    Electronically signed by Derrek Blackwell RN on 4/5/2021 at [facility]  04/05/21 2014
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
## 2021-04-06 NOTE — FLOWSHEET NOTE
04/06/21 1489   Provider Notification   Reason for Communication Review case   Provider Name Dr. Nuala Hamman   Provider Notification Physician   Method of Communication Secure Message   Response Waiting for response   Notification Time 7824   Notified Dr. Nuala Hamman of new consult. Currently await response.

## 2021-04-06 NOTE — PROGRESS NOTES
Patient admitted to [facility] Room 11 via bed from ED  Complaint upon arrival to the room: Right arm weakness, numbness, tingling   IV site free of s/s of infection or infiltration. Vital signs obtained. Assessment and data collection initiated. Oriented to room. Policies and procedures for 4a explained All questions answered with no further questions at this time. Fall prevention and safety brochure discussed with patient. 2 person skin check completed with Angela Benavidez. Patient declines PCP notification  Patient declines family notification.

## 2021-04-06 NOTE — PROGRESS NOTES
This RN contacted Dr. Anni Barcenas from psychiatry via [employer] message about new pt consult for Neurology believes that this might be depression provoked hemiplegia. Pt is severely depressed.

## 2021-04-07 LAB — CARBAMAZEPINE, TOTAL: 6.2 MCG/ML (ref 2–10)

## 2021-04-07 PROCEDURE — 96372 THER/PROPH/DIAG INJ SC/IM: CPT

## 2021-04-07 PROCEDURE — 99226 PR SBSQ OBSERVATION CARE/DAY 35 MINUTES: CPT | Performed by: INTERNAL MEDICINE

## 2021-04-07 PROCEDURE — 97110 THERAPEUTIC EXERCISES: CPT

## 2021-04-07 PROCEDURE — 97162 PT EVAL MOD COMPLEX 30 MIN: CPT

## 2021-04-07 PROCEDURE — 36415 COLL VENOUS BLD VENIPUNCTURE: CPT

## 2021-04-07 PROCEDURE — 80156 ASSAY CARBAMAZEPINE TOTAL: CPT

## 2021-04-07 PROCEDURE — G0378 HOSPITAL OBSERVATION PER HR: HCPCS

## 2021-04-07 PROCEDURE — 6360000002 HC RX W HCPCS: Performed by: FAMILY MEDICINE

## 2021-04-07 PROCEDURE — 97165 OT EVAL LOW COMPLEX 30 MIN: CPT

## 2021-04-07 PROCEDURE — 2580000003 HC RX 258: Performed by: FAMILY MEDICINE

## 2021-04-07 PROCEDURE — 99255 IP/OBS CONSLTJ NEW/EST HI 80: CPT | Performed by: NURSE PRACTITIONER

## 2021-04-07 PROCEDURE — 6370000000 HC RX 637 (ALT 250 FOR IP): Performed by: FAMILY MEDICINE

## 2021-04-07 PROCEDURE — 96376 TX/PRO/DX INJ SAME DRUG ADON: CPT

## 2021-04-07 PROCEDURE — 97535 SELF CARE MNGMENT TRAINING: CPT

## 2021-04-07 PROCEDURE — 6370000000 HC RX 637 (ALT 250 FOR IP): Performed by: PSYCHIATRY & NEUROLOGY

## 2021-04-07 PROCEDURE — 6360000002 HC RX W HCPCS: Performed by: PHYSICIAN ASSISTANT

## 2021-04-07 RX ADMIN — ACETAMINOPHEN 650 MG: 325 TABLET ORAL at 07:53

## 2021-04-07 RX ADMIN — HYDROXYZINE PAMOATE 50 MG: 50 CAPSULE ORAL at 14:29

## 2021-04-07 RX ADMIN — SERTRALINE 200 MG: 100 TABLET, FILM COATED ORAL at 08:00

## 2021-04-07 RX ADMIN — ASPIRIN 81 MG: 81 TABLET, CHEWABLE ORAL at 07:54

## 2021-04-07 RX ADMIN — BUSPIRONE HYDROCHLORIDE 30 MG: 7.5 TABLET ORAL at 20:18

## 2021-04-07 RX ADMIN — HYDROXYZINE PAMOATE 50 MG: 50 CAPSULE ORAL at 20:18

## 2021-04-07 RX ADMIN — SODIUM CHLORIDE, PRESERVATIVE FREE 10 ML: 5 INJECTION INTRAVENOUS at 20:19

## 2021-04-07 RX ADMIN — FUROSEMIDE 20 MG: 10 INJECTION, SOLUTION INTRAMUSCULAR; INTRAVENOUS at 07:54

## 2021-04-07 RX ADMIN — ENOXAPARIN SODIUM 40 MG: 40 INJECTION SUBCUTANEOUS at 07:53

## 2021-04-07 RX ADMIN — CARBAMAZEPINE 400 MG: 200 TABLET, EXTENDED RELEASE ORAL at 07:56

## 2021-04-07 RX ADMIN — BUSPIRONE HYDROCHLORIDE 30 MG: 7.5 TABLET ORAL at 07:54

## 2021-04-07 RX ADMIN — CARBAMAZEPINE 400 MG: 200 TABLET, EXTENDED RELEASE ORAL at 20:18

## 2021-04-07 RX ADMIN — SODIUM CHLORIDE, PRESERVATIVE FREE 10 ML: 5 INJECTION INTRAVENOUS at 07:54

## 2021-04-07 RX ADMIN — CARIPRAZINE 1.5 MG: 1.5 CAPSULE, GELATIN COATED ORAL at 09:02

## 2021-04-07 RX ADMIN — HYDROXYZINE PAMOATE 50 MG: 50 CAPSULE ORAL at 07:55

## 2021-04-07 ASSESSMENT — PAIN - FUNCTIONAL ASSESSMENT
PAIN_FUNCTIONAL_ASSESSMENT: PREVENTS OR INTERFERES WITH ALL ACTIVE AND SOME PASSIVE ACTIVITIES
PAIN_FUNCTIONAL_ASSESSMENT: PREVENTS OR INTERFERES SOME ACTIVE ACTIVITIES AND ADLS
PAIN_FUNCTIONAL_ASSESSMENT: PREVENTS OR INTERFERES WITH ALL ACTIVE AND SOME PASSIVE ACTIVITIES

## 2021-04-07 ASSESSMENT — PAIN SCALES - GENERAL
PAINLEVEL_OUTOF10: 5
PAINLEVEL_OUTOF10: 0
PAINLEVEL_OUTOF10: 0
PAINLEVEL_OUTOF10: 3
PAINLEVEL_OUTOF10: 7
PAINLEVEL_OUTOF10: 0
PAINLEVEL_OUTOF10: 5
PAINLEVEL_OUTOF10: 7

## 2021-04-07 ASSESSMENT — PAIN DESCRIPTION - DESCRIPTORS
DESCRIPTORS: BURNING;STABBING

## 2021-04-07 ASSESSMENT — PAIN DESCRIPTION - PAIN TYPE
TYPE: ACUTE PAIN

## 2021-04-07 ASSESSMENT — PAIN DESCRIPTION - ONSET
ONSET: ON-GOING
ONSET: GRADUAL
ONSET: ON-GOING

## 2021-04-07 ASSESSMENT — PAIN DESCRIPTION - ORIENTATION
ORIENTATION: RIGHT;MID;LOWER
ORIENTATION: MID;LOWER
ORIENTATION: RIGHT;MID;LOWER
ORIENTATION: RIGHT;MID;LOWER
ORIENTATION: RIGHT;LOWER;MID
ORIENTATION: RIGHT;MID;LOWER
ORIENTATION: RIGHT;MID;LOWER

## 2021-04-07 ASSESSMENT — PAIN DESCRIPTION - PROGRESSION
CLINICAL_PROGRESSION: NOT CHANGED
CLINICAL_PROGRESSION: GRADUALLY WORSENING

## 2021-04-07 ASSESSMENT — PAIN DESCRIPTION - FREQUENCY
FREQUENCY: INTERMITTENT
FREQUENCY: INTERMITTENT

## 2021-04-07 ASSESSMENT — PAIN DESCRIPTION - LOCATION
LOCATION: ARM;BACK
LOCATION: ARM;BACK
LOCATION: BACK
LOCATION: ARM;BACK
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
## 2021-04-07 NOTE — CONSULTS
Daily Progress Note  Iza Orozco MD  4/7/2021    Reviewed patient's current plan of care and vital signs with nursing staff. Patient is feeling the same but she has less crying spells this morning. She is still feeling depressed and anxious. She is concerned not able to move her right upper and lower extremities. Briefly discussed her symptoms with hospitalist.      SUBJECTIVE:    Patient is feeling unchanged. denies suicidal ideation. ROS: Patient has new complaints:  No  Sleeping adequately:  Yes      Mental Status Examination:  Patient is cooperative. Speech: Normal rate and tone. No abnormal movements, tics or mannerisms. Mood anxious and sad, affect restricted. Suicidal ideation Absent. Homicidal ideations Absent. Hallucinations Absent. Delusions Absent. Thought Content: normal. Thought Processes: Worrisome. Alert and oriented X 3. Attention and concentration fair. MEMORY intact. Insight and Judgement limited. Medications  Current Facility-Administered Medications: cariprazine hcl (VRAYLAR) capsule 1.5 mg, 1.5 mg, Oral, Daily  busPIRone (BUSPAR) tablet 30 mg, 30 mg, Oral, BID  hydrOXYzine (VISTARIL) capsule 50 mg, 50 mg, Oral, TID  sertraline (ZOLOFT) tablet 200 mg, 200 mg, Oral, Daily  sodium chloride flush 0.9 % injection 10 mL, 10 mL, Intravenous, 2 times per day  sodium chloride flush 0.9 % injection 10 mL, 10 mL, Intravenous, PRN  0.9 % sodium chloride infusion, 25 mL, Intravenous, PRN  acetaminophen (TYLENOL) tablet 650 mg, 650 mg, Oral, Q6H PRN **OR** acetaminophen (TYLENOL) suppository 650 mg, 650 mg, Rectal, Q6H PRN  aspirin chewable tablet 81 mg, 81 mg, Oral, Daily  enoxaparin (LOVENOX) injection 40 mg, 40 mg, Subcutaneous, Daily  carBAMazepine (TEGRETOL XR) extended release tablet 400 mg, 400 mg, Oral, BID    ASSESSMENT AND PLAN  Patient's symptoms show no change  Continue with current psychotropics. Attempt to develop insight  Psycho-education conducted.   Supportive Therapy

## 2021-04-07 NOTE — PROGRESS NOTES
[facility]  INPATIENT PHYSICAL THERAPY  EVALUATION  [facility] PEDIATRICS 6E - 6E-55/055-A    Time In: 8931  Time Out: 1000  Timed Code Treatment Minutes: 10 Minutes  Minutes: 22          Date: 2021  Patient Name: Edvin Reddy,  Gender:  female        MRN: [id number]  : 1975  (54 y.o.)      Referring Practitioner: CHELSEA Lombardi  Diagnosis: Stroke-like symptoms  Additional Pertinent Hx: Per ER note on 3/5/2021: 54 y.o. female who presents for right-sided weakness. Patient reports that at noon, while driving, she started to experience right upper extremity tingling and heaviness. Her right lower extremity is weak. She also feels the tingling and weakness on the right side of her face. She feels it is difficult to speak but does not feel confused. The patient presented to [facility] earlier today and had a work-up but was discharged. Patient was able to drive herself here. She describes dizziness as off-balance. MRI results were negative. Per Dr. Pio Woodruff note: We need to ruleout possibility of conversion disorder. Restrictions/Precautions:  Restrictions/Precautions: Fall Risk    Subjective:  Chart Reviewed: Yes  Patient assessed for rehabilitation services?: Yes  Family / Caregiver Present: No  Subjective: RN approved session, pt is seated in recliner, upset and agitated initially, received numerous phone calls and pt yelling at person on the phone, afterwards stating her family stresses her out so much. Pt cooperative with PT, requesting to use the bathroom during session. Pt requesting to get cleaned up, left with nursing student in bathroom at the end of the session.     General:  Overall Orientation Status: Within Functional Limits  Follows Commands: Within Functional Limits    Vision: Within Functional Limits    Hearing: Within functional limits         Pain: 4/10: chronic LBP    Vitals: Vitals not assessed per clinical judgement, see nursing 42.13    ASSESSMENT:  Activity Tolerance:  Patient tolerance of  treatment: fair. Treatment Initiated: Treatment and education initiated within context of evaluation. Evaluation time included review of current medical information, gathering information related to past medical, social and functional history, completion of standardized testing, formal and informal observation of tasks, assessment of data and development of plan of care and goals. Treatment time included skilled education and facilitation of tasks to increase safety and independence with functional mobility for improved independence and quality of life. See above exercises. Assessment: Body structures, Functions, Activity limitations: Decreased functional mobility , Decreased balance, Decreased coordination, Decreased endurance, Increased pain, Decreased strength  Assessment: Pt tolerates session fair, limited by c/o R sided weakness, \"heaviness and burning\", pt with dysarthria. PT to continue to progress strength and functional mobility. Prognosis: Good    REQUIRES PT FOLLOW UP: Yes    Discharge Recommendations:  Discharge Recommendations: IP Rehab    Patient Education:  PT Education: PT Role, Plan of Care    Equipment Recommendations: Other: will monitor for needs- may need RW    Plan:  Times per week: 6x N  Current Treatment Recommendations: Strengthening, Functional Mobility Training, Neuromuscular Re-education, Equipment Evaluation, Education, & procurement, Transfer Training, Gait Training, Stair training, Balance Training, Patient/Caregiver Education & Training, Safety Education & Training    Goals:  Patient goals : to get back to normal  Short term goals  Time Frame for Short term goals: by discharge  Short term goal 1: Pt to transfer supine <--> sit S to enable pt to get in/out of bed. Short term goal 2: Pt to transfer sit <--> stand S for increased functional mobility.   Short term goal 3: Pt to ambulate >200 feet without AD SBA for household ambulation. Short term goal 4: Pt to ascend/descend 20 steps with HR SBA for home entry. Long term goals  Time Frame for Long term goals : NA due to short length of stay. Following session, patient left in safe position with all fall risk precautions in place.
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
## 2021-04-07 NOTE — PLAN OF CARE
Problem: Pain:  Goal: Pain level will decrease  Description: Pain level will decrease  4/7/2021 1035 by Nelsy Harris RN  Outcome: Ongoing  Note: Patient able to use 0-10 pain scale. Denies pain at this time. Agreeable to take PRN pain medications. Problem: Falls - Risk of:  Goal: Will remain free from falls  Description: Will remain free from falls  4/7/2021 1035 by Nelsy Harris RN  Outcome: Ongoing  Note: Call light in reach, bed in lowest position, and bed alarm activated. Education given on use of call light before ambulation and when in need of assistance. Patient expressed understanding. Hourly visual checks performed and charted. Toileting offered to patient. No falls this shift, at any time. Will continue to monitor. Problem: Skin Integrity/Risk  Goal: No skin breakdown during hospitalization  4/7/2021 1035 by Nelsy Harris RN  Outcome: Ongoing  Note: No signs of new skin breakdown with each assessment. Skin remains warm, dry, intact. Mucous membranes pink & moist. Patient is able to turn self. Problem: Discharge Planning:  Goal: Discharged to appropriate level of care  Description: Discharged to appropriate level of care  4/7/2021 1035 by Nelsy Harris RN  Outcome: Ongoing  Note: Discharge planning in process and discussed with patient/family. Social work consulted for any additional needs. Care manager aware of discharge needs. Problem: Mood - Altered:  Goal: Mood stable  Description: Mood stable  4/7/2021 1035 by Nelsy Harris RN  Outcome: Ongoing  Note: Pt very tearful and anxious this shift. Psych on board. Care plan reviewed with patient. Patient verbalizes understanding of the plan of care and contributed to goal setting.

## 2021-04-07 NOTE — PROGRESS NOTES
[address] [facility]  INPATIENT OCCUPATIONAL THERAPY  [facility] NEUROSCIENCES 4A  EVALUATION    Time:    Time In: 5084  Time Out: 7248  Timed Code Treatment Minutes: 23 Minutes  Minutes: 38          Date: 2021  Patient Name: Artem Diehl,   Gender: female      MRN: [id number]  : 1975  (54 y.o.)  Referring Practitioner: [place] TRAN  Diagnosis: stroke like symptoms  Additional Pertinent Hx: Per ER note on 3/5/2021: 54 y.o. female who presents for right-sided weakness. Patient reports that at noon, while driving, she started to experience right upper extremity tingling and heaviness. Her right lower extremity is weak. She also feels the tingling and weakness on the right side of her face. She feels it is difficult to speak but does not feel confused. The patient presented to [facility] earlier today and had a work-up but was discharged. Patient was able to drive herself here. She describes dizziness as off-balance. MRI results were negative. Per Dr. Susan Burrows note: We need to ruleout possibility of conversion disorder.     Restrictions/Precautions:  Restrictions/Precautions: Fall Risk    Subjective  Chart Reviewed: Yes, Orders, Progress Notes, History and Physical  Patient assessed for rehabilitation services?: Yes  Family / Caregiver Present: No    Subjective: Pt up walking to bathroom with nurse upon arrival of therapist. Pt tearful throughout session \"I'm scared\" \" I want to get my life back\"    Pain:  Pain Assessment  Patient Currently in Pain: Yes(at rest in back)  Pain Level: 3    Vitals: Vitals not assessed per clinical judgement, see nursing flowsheet    Social/Functional History:  Lives With: Alone  Type of Home: Apartment  Home Layout: One level  Home Access: Stairs to enter with rails(20 railing on R side)   Bathroom Shower/Tub: Tub/Shower unit  Bathroom Toilet: Standard       ADL Assistance: Independent  Homemaking Assistance: Independent  Ambulation Assistance: Independent  Transfer Assistance: Independent       Occupation: Full time employment(Works as a  at [employer] a week.)  Additional Comments: Pt reports fully indep PLOF. VISION:WFL    HEARING:  WFL    COGNITION: Decreased Insight, Decreased Problem Solving and Decreased Safety Awareness    RANGE OF MOTION:  Left Upper Extremity:  WFL; Pt tolerated very little RUE AROM/PROM d/t pain/tingling with mobility    STRENGTH:  LUE WFL; RUE NT d/t painful ROM    SENSATION:   Pt reports numbness & tingling in RUE-finger tips to shoulder + in R side of face    ADL:   Grooming: Contact Guard Assistance. stood at sink to wash L hand-did not attempt to wash RUE  Lower Extremity Dressing: Dependent. for donning slipper socks  Toileting: Dependent. Pt reports unable to wipe with LUE d/t can't reach; Pt reports usually wipes with RUE  Toilet Transfer: [address]. BALANCE:  Sitting Balance:  Stand By Assistance. Standing Balance: Contact Guard Assistance. BED MOBILITY:  Not Tested    TRANSFERS:  Sit to Stand:  Contact Guard Assistance. from recliner    FUNCTIONAL MOBILITY:  Assistive Device: none  Assist Level:  Minimal Assistance. Distance: To and from bathroom  Pt pushing heavily into therapist's HH A with LUE; 2nd attempt to/from bathroom with RW use & min A-education for walker safety & placement of RUE onto walker (able to take RUE off walker upon returning to recliner)     Activity Tolerance:  Patient tolerance of  treatment: fair. Assessment:  Assessment: Pt demo decreased ADL & functional mobility status over PLOF d/t decreased balance, RUE use, & safety awareness. Continued OT recommended to educate Pt on safety & adaptative strategies for increasing indep for returning home.   Performance deficits / Impairments: Decreased functional mobility , Decreased safe awareness, Decreased balance, Decreased cognition, Decreased ADL status, Decreased endurance, Decreased ROM, Decreased strength  Prognosis: Fair  REQUIRES OT FOLLOW UP: Yes  Decision Making: Medium Complexity  Safety Devices in place: Yes  Type of devices: All fall risk precautions in place, Gait belt, Call light within reach    Treatment Initiated: Treatment and education initiated within context of evaluation. Evaluation time included review of current medical information, gathering information related to past medical, social and functional history, completion of standardized testing, formal and informal observation of tasks, assessment of data and development of plan of care and goals. Treatment time included skilled education and facilitation of tasks to increase safety and independence with ADL's for improved functional independence and quality of life. Discharge Recommendations:  Continue to assess pending progress    Patient Education:  OT Education: OT Role, Plan of Care, ADL Adaptive Strategies  Barriers to Learning: emotional state    Equipment Recommendations: Other: Monitor pending progress    Plan:  Times per week: 6x  Current Treatment Recommendations: Balance Training, Functional Mobility Training, Safety Education & Training, Self-Care / ADL, Endurance Training, ROM, Cognitive Reorientation. See long-term goal time frame for expected duration of plan of care. If no long-term goals established, a short length of stay is anticipated. Goals:  Patient goals : \"get my life back\"  Short term goals  Time Frame for Short term goals: until discharge  Short term goal 1: Complete various t/fs including toilet with S & 0-2 vcs for safety  Short term goal 2: Complete grooming tasks using RUE as assist with min vcs to increase RUE participation for eventual return to RUE dominance with ADLs.   Short term goal 3: Complete mobility to/from bathroom with RW, CGA, & min vcs for walker safety  Short term goal 4: Complete BADL routine with mod A & min vcs for adaptative strategies  Long term goals  Time Frame for Long term goals : No LTG set d/t short ELOS         Following session, patient left in safe position with all fall risk precautions in place.

## 2021-04-07 NOTE — PLAN OF CARE
Problem: Pain:  Goal: Pain level will decrease  Description: Pain level will decrease  4/7/2021 0027 by Ronny Gill  Outcome: Ongoing  Note: Patient reports a pain of 4/10 and located in her right upper extremity. Pain is acute, described as numbness and tingling. Continuing to reposition and offer rest, pharmacological interventions administered per physician order. Problem: Falls - Risk of:  Goal: Will remain free from falls  Description: Will remain free from falls  4/7/2021 0027 by Ronny Gill  Outcome: Ongoing  Note: Patient call light and possessions within easy reach. Patient was educated on use of call light and is able to use successfully. Nonskid's are on at this time, bed alarm is on, 2/4 side rails are up, and bed is in the lowest position. Problem: Falls - Risk of:  Goal: Absence of physical injury  Description: Absence of physical injury  4/7/2021 0027 by Ronny Gill  Outcome: Ongoing  Note: Hourly rounding completed, patient educated on importance of call light use and is within easy reach. Nonskid's are in place, side rails up x2, and bed alarm is on. Problem: Skin Integrity/Risk  Goal: No skin breakdown during hospitalization  4/7/2021 0027 by Ronny Gill  Outcome: Ongoing  Note: Patient on special mattress at this time, and turns are self-regulated per patient prefrence. Problem: Discharge Planning:  Goal: Discharged to appropriate level of care  Description: Discharged to appropriate level of care  4/7/2021 0027 by Ronny Gill  Outcome: Ongoing  Note: Discharge planning ongoing at this time. Social work on board. Problem: Mood - Altered:  Goal: Mood stable  Description: Mood stable  Outcome: Ongoing  Note: Psychiatry on board at this time. Anti-depressant medications administered per physician order. Care plan reviewed with patient. Patient verbalized understanding of plan of care and contributed to goal setting.

## 2021-04-07 NOTE — CARE COORDINATION
4/7/21, 12:12 PM EDT    DISCHARGE ON [address] day: 0  Location: -/05-A Reason for admit: Stroke-like symptoms [R29.90]   Procedure: MRI Brain Impression:     No significant abnormalities. MRI Cervical Spine WO Contrast   Impression:         Minimal degenerative changes without significant spinal canal or neuroforaminal stenosis at any cervical level. Barriers to Discharge: PT/OT following, Physiatry consult, Psychiatry following. Lovenox. PCP: PANTERA Odonnell - CNP   %  Patient Goals/Plan/Treatment Preferences: From home alone. Will follow Physiatry recommendations for possible IP Rehab.

## 2021-04-08 VITALS
TEMPERATURE: 98.5 F | BODY MASS INDEX: 48.38 KG/M2 | SYSTOLIC BLOOD PRESSURE: 142 MMHG | HEIGHT: 64 IN | OXYGEN SATURATION: 97 % | RESPIRATION RATE: 16 BRPM | DIASTOLIC BLOOD PRESSURE: 99 MMHG | HEART RATE: 79 BPM | WEIGHT: 283.4 LBS

## 2021-04-08 PROCEDURE — 96372 THER/PROPH/DIAG INJ SC/IM: CPT

## 2021-04-08 PROCEDURE — 99232 SBSQ HOSP IP/OBS MODERATE 35: CPT | Performed by: NURSE PRACTITIONER

## 2021-04-08 PROCEDURE — 6370000000 HC RX 637 (ALT 250 FOR IP): Performed by: FAMILY MEDICINE

## 2021-04-08 PROCEDURE — G0378 HOSPITAL OBSERVATION PER HR: HCPCS

## 2021-04-08 PROCEDURE — 6360000002 HC RX W HCPCS: Performed by: FAMILY MEDICINE

## 2021-04-08 PROCEDURE — 97535 SELF CARE MNGMENT TRAINING: CPT

## 2021-04-08 PROCEDURE — 99217 PR OBSERVATION CARE DISCHARGE MANAGEMENT: CPT | Performed by: INTERNAL MEDICINE

## 2021-04-08 PROCEDURE — 97116 GAIT TRAINING THERAPY: CPT

## 2021-04-08 PROCEDURE — 97530 THERAPEUTIC ACTIVITIES: CPT

## 2021-04-08 PROCEDURE — 2580000003 HC RX 258: Performed by: FAMILY MEDICINE

## 2021-04-08 PROCEDURE — 6370000000 HC RX 637 (ALT 250 FOR IP): Performed by: PSYCHIATRY & NEUROLOGY

## 2021-04-08 RX ORDER — HYDROXYZINE PAMOATE 50 MG/1
50 CAPSULE ORAL 3 TIMES DAILY
Qty: 42 CAPSULE | Refills: 0 | Status: SHIPPED | OUTPATIENT
Start: 2021-04-08 | End: 2021-04-22

## 2021-04-08 RX ORDER — BUSPIRONE HYDROCHLORIDE 15 MG/1
15 TABLET ORAL 3 TIMES DAILY
Qty: 90 TABLET | Refills: 1 | Status: ON HOLD | OUTPATIENT
Start: 2021-04-08 | End: 2021-09-06

## 2021-04-08 RX ADMIN — BUSPIRONE HYDROCHLORIDE 30 MG: 7.5 TABLET ORAL at 08:45

## 2021-04-08 RX ADMIN — CARBAMAZEPINE 400 MG: 200 TABLET, EXTENDED RELEASE ORAL at 08:45

## 2021-04-08 RX ADMIN — ASPIRIN 81 MG: 81 TABLET, CHEWABLE ORAL at 08:45

## 2021-04-08 RX ADMIN — SODIUM CHLORIDE, PRESERVATIVE FREE 10 ML: 5 INJECTION INTRAVENOUS at 09:13

## 2021-04-08 RX ADMIN — ENOXAPARIN SODIUM 40 MG: 40 INJECTION SUBCUTANEOUS at 08:44

## 2021-04-08 RX ADMIN — HYDROXYZINE PAMOATE 50 MG: 50 CAPSULE ORAL at 13:48

## 2021-04-08 RX ADMIN — SERTRALINE 200 MG: 100 TABLET, FILM COATED ORAL at 08:45

## 2021-04-08 RX ADMIN — HYDROXYZINE PAMOATE 50 MG: 50 CAPSULE ORAL at 08:45

## 2021-04-08 RX ADMIN — CARIPRAZINE 1.5 MG: 1.5 CAPSULE, GELATIN COATED ORAL at 08:45

## 2021-04-08 NOTE — CARE COORDINATION
PM&R consult and progress note faxed to [facility] ([place], [place]) per EARL Vee CNP request @ [phone]. Staff at [facility] stated pt has a follow up with her counselor Kaycee Flores on May 2, and a follow up with Psychiatry on May 24. There are no earlier appointments available.   Electronically signed by Vandana Jordan RN on 4/8/2021 at 11:23 AM
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
## 2021-04-08 NOTE — PLAN OF CARE
Problem: Pain:  Goal: Pain level will decrease  Description: Pain level will decrease  Outcome: Met This Shift  Note: Pt. Denies pain  Goal: Control of acute pain  Description: Control of acute pain  Outcome: Met This Shift  Note: Pt. Denies pain   Goal: Control of chronic pain  Description: Control of chronic pain  Outcome: Met This Shift  Note: Pt. Denies pain      Problem: Falls - Risk of:  Goal: Will remain free from falls  Description: Will remain free from falls  Outcome: Met This Shift  Note: Pt. Did not fall during this shift   Goal: Absence of physical injury  Description: Absence of physical injury  Outcome: Met This Shift  Note: Pt. Remains free from physical injury      Problem: Skin Integrity/Risk  Goal: No skin breakdown during hospitalization  Outcome: Met This Shift  Note: Pt. Shows no new skin breakdown      Problem: Mood - Altered:  Goal: Mood stable  Description: Mood stable  Outcome: Met This Shift  Note: Pt. Remained calm and cooperative    Care plan reviewed with pt. Pt voices understanding.

## 2021-04-08 NOTE — DISCHARGE SUMMARY
Hospital Medicine Discharge Summary      Patient Identification:   Marcin Dwyer   : 1975  MRN: [id number]   Account: [de-identified]      Patient's PCP: PANTERA Newton CNP    Admit Date: 2021     Discharge Date:   2021      Admitting Physician: Anuradha Lyman MD     Discharge Physician: Kristi Bae MD     Discharge Diagnoses: Active Hospital Problems    Diagnosis Date Noted    Right sided weakness [R53.1] 2021    Generalized anxiety disorder   Depression   PTSD   Cocaine use disorder   Conversion disorder    The patient was seen and examined on day of discharge and this discharge summary is in conjunction with any daily progress note from day of discharge. Hospital Course: Marcin Dwyer is a 54 y.o. female admitted to [address] [place] [address] on 2021 with obesity , admitted due to right sided weakness. Extensive work up done , including a CT and MRI of the head negative for CVA. MRI of the spine to rule out cervical stenosis which is negative. Dr. Jay Both from Neuro and Psych alvarez dr. Nawaf Gan seen the pt. Overall she feels better, ambulating well, and has underlying psych disorders, appreciate consultants input. Home today in improved and stable condition, with home health to follow up with med changes, and compliance.                Exam:     Vitals:  Vitals:    21 0042 21 0311 21 0849 21 1057   BP: 122/80 128/88 131/73 (!) 142/99   Pulse: 72 69 79 79   Resp: 18 16 16 16   Temp: 97.7 °F (36.5 °C) 97.5 °F (36.4 °C) 98.1 °F (36.7 °C) 98.5 °F (36.9 °C)   TempSrc: Oral Oral Oral Oral   SpO2: 95% 94% 96% 97%   Weight:       Height:         Weight: Weight: 283 lb 6.4 oz (128.5 kg)     24 hour intake/output:    Intake/Output Summary (Last 24 hours) at 2021 1415  Last data filed at 2021 1245  Gross per 24 hour   Intake 850 ml   Output --   Net 850 ml         General appearance:  No apparent distress, appears stated age and cooperative. HEENT:  Normal cephalic, atraumatic without obvious deformity. Pupils equal, round, and reactive to light. Extra ocular muscles intact. Conjunctivae/corneas clear. Neck: Supple, with full range of motion. No jugular venous distention. Trachea midline. Respiratory:  Normal respiratory effort. Clear to auscultation, bilaterally without Rales/Wheezes/Rhonchi. Cardiovascular:  Regular rate and rhythm with normal S1/S2 without murmurs, rubs or gallops. Abdomen: Soft, non-tender, non-distended with normal bowel sounds. Musculoskeletal:  No clubbing, cyanosis or edema bilaterally. Full range of motion without deformity. Skin: Skin color, texture, turgor normal.  No rashes or lesions. Neurologic:  Neurovascularly intact without any focal sensory/motor deficits. Cranial nerves: II-XII intact, grossly non-focal.  Psychiatric:  Alert and oriented, thought content appropriate, normal insight  Capillary Refill: Brisk,< 3 seconds   Peripheral Pulses: +2 palpable, equal bilaterally       Labs: For convenience and continuity at follow-up the following most recent labs are provided:      CBC:    Lab Results   Component Value Date    WBC 6.3 04/06/2021    HGB 12.7 04/06/2021    HCT 39.6 04/06/2021     04/06/2021       Renal:    Lab Results   Component Value Date     04/06/2021    K 4.1 04/06/2021     04/06/2021    CO2 23 04/06/2021    BUN 13 04/06/2021    CREATININE 0.6 04/06/2021    CALCIUM 8.3 04/06/2021    PHOS 2.8 04/06/2021         Significant Diagnostic Studies    Radiology:   MRI CERVICAL SPINE WO CONTRAST   Final Result    Minimal degenerative changes without significant spinal canal or neuroforaminal stenosis at any cervical level. **This report has been created using voice recognition software. It may contain minor errors which are inherent in voice recognition technology. **      Final report electronically signed by Dr. Yolanda Duggan MD on 4/6/2021 2:09 PM MRI INTERPRETATION OF OUTSIDE IMAGES   Final Result   Impression:      No significant abnormalities. This document has been electronically signed by: Milagros Hoyos MD on    04/06/2021 01:27 AM      MRI INTERPRETATION OF OUTSIDE IMAGES   Final Result   Impression:      No significant abnormalities. This document has been electronically signed by: Milagros Hoyos MD on    04/06/2021 01:27 AM      XR CHEST PORTABLE   Final Result      No acute intrathoracic process. **This report has been created using voice recognition software. It may contain minor errors which are inherent in voice recognition technology. **      Final report electronically signed by Dr. Anjel Mares on 4/5/2021 7:16 PM      CT HEAD WO CONTRAST (CODE STROKE)   Final Result      No mass effect or acute hemorrhage. **This report has been created using voice recognition software. It may contain minor errors which are inherent in voice recognition technology. **      Final report electronically signed by Dr. Anjel Mares on 4/5/2021 6:28 PM             Consults:     IP CONSULT TO NEUROLOGY  IP CONSULT TO NEUROLOGY  IP CONSULT TO PSYCHIATRY  IP CONSULT TO SOCIAL WORK  IP CONSULT TO PHYSICAL MEDICINE REHAB  IP CONSULT TO REHAB/TCU ADMISSION COORDINATOR    Disposition: Home  Condition at Discharge: Stable    Code Status:  Full Code     Patient Instructions:    Discharge lab work: Activity: activity as tolerated  Diet: DIET GENERAL;      Follow-up visits:   No follow-up provider specified.        Discharge Medications:      Uvaldo Stokes   [place] Medication Instructions A:[id number]    Printed on:04/08/21 1415   Medication Information                      albuterol sulfate HFA (VENTOLIN HFA) 108 (90 Base) MCG/ACT inhaler  Inhale 2 puffs into the lungs every 6 hours as needed for Wheezing             Ascorbic Acid (VITAMIN C) 500 MG CHEW  Take 1,500 mg by mouth daily             aspirin 81 MG chewable tablet  Take 81 mg by mouth daily             atorvastatin (LIPITOR) 10 MG tablet  Take 10 mg by mouth daily             busPIRone (BUSPAR) 15 MG tablet  Take 15 mg by mouth 3 times daily             carBAMazepine (TEGRETOL XR) 400 MG extended release tablet  Take 400 mg by mouth 2 times daily             cariprazine hcl (VRAYLAR) 1.5 MG capsule  Take 1 capsule by mouth daily             Flaxseed, Linseed, (FLAX SEED OIL) 1000 MG CAPS  Take 1,000 mg by mouth daily             hydrOXYzine (VISTARIL) 25 MG capsule  Take 50 mg by mouth 3 times daily as needed for Itching             hydrOXYzine (VISTARIL) 50 MG capsule  Take 1 capsule by mouth 3 times daily for 14 days             Multiple Vitamins-Minerals (THERAPEUTIC MULTIVITAMIN-MINERALS) tablet  Take 1 tablet by mouth daily             sertraline (ZOLOFT) 100 MG tablet  Take 200 mg by mouth daily                 Time Spent on discharge is more than 45 minutes in the examination, evaluation, counseling and review of medications and discharge plan. Discharge Medications for PCI/MI (performed or attempted):    NA           Signed: Thank you PANTERA Cohen CNP for the opportunity to be involved in this patient's care.     Electronically signed by Genaro Massey MD on 4/8/2021 at 2:15 PM

## 2021-04-08 NOTE — PROGRESS NOTES
Daily Progress Note  Allison Kraft MD  4/8/2021    Reviewed patient's current plan of care and vital signs with nursing staff. Patient is feeling much better. She is able to move her right upper and lower extremities. She is able to walk also without a walker. She is still feeling depressed but she has no crying spells today. SUBJECTIVE:    Patient is feeling better. denies suicidal ideation. ROS: Patient has new complaints:  No  Sleeping adequately:  Yes      Mental Status Examination:  Patient is cooperative. Speech: Normal rate and tone. No abnormal movements, tics or mannerisms. Mood dysthymic, affect restricted. Suicidal ideation Absent. Homicidal ideations Absent. Hallucinations Absent. Delusions Absent. Thought Content: normal. Thought Processes: Goal oriented. Alert and oriented X 3. Attention and concentration fair. MEMORY intact. Insight and Judgement limited. Medications  Current Facility-Administered Medications: cariprazine hcl (VRAYLAR) capsule 1.5 mg, 1.5 mg, Oral, Daily  busPIRone (BUSPAR) tablet 30 mg, 30 mg, Oral, BID  hydrOXYzine (VISTARIL) capsule 50 mg, 50 mg, Oral, TID  sertraline (ZOLOFT) tablet 200 mg, 200 mg, Oral, Daily  sodium chloride flush 0.9 % injection 10 mL, 10 mL, Intravenous, 2 times per day  sodium chloride flush 0.9 % injection 10 mL, 10 mL, Intravenous, PRN  0.9 % sodium chloride infusion, 25 mL, Intravenous, PRN  acetaminophen (TYLENOL) tablet 650 mg, 650 mg, Oral, Q6H PRN **OR** acetaminophen (TYLENOL) suppository 650 mg, 650 mg, Rectal, Q6H PRN  aspirin chewable tablet 81 mg, 81 mg, Oral, Daily  enoxaparin (LOVENOX) injection 40 mg, 40 mg, Subcutaneous, Daily  carBAMazepine (TEGRETOL XR) extended release tablet 400 mg, 400 mg, Oral, BID    ASSESSMENT AND PLAN  Patient's symptoms are improving   Continue with current psychotropics. Attempt to develop insight  Psycho-education conducted. Supportive Therapy conducted.   Okay to discharge per psych standpoint  Follow-up at [facility].
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
## 2021-04-08 NOTE — FLOWSHEET NOTE
[place] [address] PROGRESS NOTE      Patient: Marcin Dwyer  Room #: 1C-38/139-S            YOB: 1975  Age: 54 y.o. Gender: female            Admit Date & Time: 4/5/2021  6:24 PM    Assessment:  Caryl Kiser is a 54year old female who is in [place], but is ready to be discharged. She is awake, alert and in good spirits. She shared with this  what brought her here and admitted that she was overwhelmed with life. She now has a job and a new place to live. Interventions:  She accepted prayer for healing of body, mind and spirit    Outcomes:  encouraged    Plan:    1. Going home soon.      Electronically signed by Vadim Dumont, on 4/8/2021 at 4:55 PM.  [facility]  [phone]

## 2021-04-08 NOTE — CARE COORDINATION
4/8/21, 2:36 PM EDT    Patient goals/plan/ treatment preferences discussed by  and . Patient goals/plan/ treatment preferences reviewed with patient/ family. Patient/ family verbalize understanding of discharge plan and are in agreement with goal/plan/treatment preferences. Understanding was demonstrated using the teach back method. AVS provided by RN at time of discharge, which includes all necessary medical information pertaining to the patients current course of illness, treatment, post-discharge goals of care, and treatment preferences. Pt to be discharged to home. She denies needs and declines HH.

## 2021-04-08 NOTE — CARE COORDINATION
4/8/21, 7:14 AM EDT    DISCHARGE BARRIERS        Patient transferred to [facility]. Report given to unit Medina Singh, regarding discharge plan for this patient.

## 2021-04-18 ENCOUNTER — HOSPITAL ENCOUNTER (EMERGENCY)
Age: 46
Discharge: HOME | End: 2021-04-18
Payer: COMMERCIAL

## 2021-04-18 VITALS
SYSTOLIC BLOOD PRESSURE: 140 MMHG | TEMPERATURE: 98 F | DIASTOLIC BLOOD PRESSURE: 87 MMHG | WEIGHT: 290 LBS | BODY MASS INDEX: 49.51 KG/M2 | HEIGHT: 64 IN | OXYGEN SATURATION: 98 % | HEART RATE: 82 BPM | RESPIRATION RATE: 19 BRPM

## 2021-04-18 DIAGNOSIS — N92.4 EXCESSIVE BLEEDING IN PREMENOPAUSAL PERIOD: Primary | ICD-10-CM

## 2021-04-18 DIAGNOSIS — R42 DIZZINESS: ICD-10-CM

## 2021-04-18 LAB
HCT VFR BLD CALC: 40.9 % (ref 37–47)
HEMOGLOBIN: 14.1 GM/DL (ref 12–16)
MCH RBC QN AUTO: 31.8 PG (ref 27–31)
MCHC RBC AUTO-ENTMCNC: 34.5 GM/DL (ref 33–37)
MCV RBC AUTO: 92 FL (ref 81–99)
PDW BLD-RTO: 13.6 % (ref 11.5–14.5)
PLATELET # BLD: 245 THOU/MM3 (ref 130–400)
PMV BLD AUTO: 10.1 FL (ref 7.4–10.4)
RBC # BLD: 4.44 MILL/MM3 (ref 4.2–5.4)
WBC # BLD: 5.8 THOU/MM3 (ref 4.8–10.8)

## 2021-04-18 PROCEDURE — 99213 OFFICE O/P EST LOW 20 MIN: CPT

## 2021-04-18 PROCEDURE — 85025 COMPLETE CBC W/AUTO DIFF WBC: CPT

## 2021-04-18 PROCEDURE — 99214 OFFICE O/P EST MOD 30 MIN: CPT | Performed by: NURSE PRACTITIONER

## 2021-04-18 PROCEDURE — 36415 COLL VENOUS BLD VENIPUNCTURE: CPT

## 2021-04-18 ASSESSMENT — ENCOUNTER SYMPTOMS
COUGH: 0
VOMITING: 0
NAUSEA: 0
SHORTNESS OF BREATH: 0
BACK PAIN: 1
SORE THROAT: 0

## 2021-04-18 NOTE — ED PROVIDER NOTES
disease) ([facility] [place] [facility].)     Depression     PTSD (post-traumatic stress disorder)        SURGICALHISTORY records of previous inpatient admission into the hospital were reviewed. Patient  has a past surgical history that includes Tubal ligation. CURRENT MEDICATIONS       Previous Medications    ALBUTEROL SULFATE HFA (VENTOLIN HFA) 108 (90 BASE) MCG/ACT INHALER    Inhale 2 puffs into the lungs every 6 hours as needed for Wheezing    ASCORBIC ACID (VITAMIN C) 500 MG CHEW    Take 1,500 mg by mouth daily    ASPIRIN 81 MG CHEWABLE TABLET    Take 81 mg by mouth daily    ATORVASTATIN (LIPITOR) 10 MG TABLET    Take 10 mg by mouth daily    BUSPIRONE (BUSPAR) 15 MG TABLET    Take 15 mg by mouth 3 times daily    CARBAMAZEPINE (TEGRETOL XR) 400 MG EXTENDED RELEASE TABLET    Take 400 mg by mouth 2 times daily    CARIPRAZINE HCL (VRAYLAR) 1.5 MG CAPSULE    Take 1 capsule by mouth daily    FLAXSEED, LINSEED, (FLAX SEED OIL) 1000 MG CAPS    Take 1,000 mg by mouth daily    HYDROXYZINE (VISTARIL) 25 MG CAPSULE    Take 50 mg by mouth 3 times daily as needed for Itching    HYDROXYZINE (VISTARIL) 50 MG CAPSULE    Take 1 capsule by mouth 3 times daily for 14 days    MULTIPLE VITAMINS-MINERALS (THERAPEUTIC MULTIVITAMIN-MINERALS) TABLET    Take 1 tablet by mouth daily    SERTRALINE (ZOLOFT) 100 MG TABLET    Take 200 mg by mouth daily       ALLERGIES     Patient is is allergic to penicillins. Patients   There is no immunization history on file for this patient. FAMILY HISTORY     Patient's family history includes Asthma in her mother; COPD in her mother. SOCIAL HISTORY     Patient  reports that she has been smoking cigarettes. She has a 10.00 pack-year smoking history. She has never used smokeless tobacco. She reports that she does not drink alcohol or use drugs.     PHYSICAL EXAM     ED TRIAGE VITALS  BP: (!) 140/87, Temp: 98 °F (36.7 °C), Pulse: 82, Resp: 19, SpO2: 98 %,Estimated body mass index is 49.78 kg/m² as calculated from the following:    Height as of this encounter: 5' 4\" (1.626 m). Weight as of this encounter: 290 lb (131.5 kg). ,Patient's last menstrual period was 04/18/2021. Physical Exam  Vitals signs and nursing note reviewed. Constitutional:       General: She is not in acute distress. Appearance: She is well-developed. She is not diaphoretic. Eyes:      Conjunctiva/sclera:      Right eye: Right conjunctiva is not injected. Left eye: Left conjunctiva is not injected. Pupils: Pupils are equal, round, and reactive to light. Pupils are equal.   Neck:      Musculoskeletal: Normal range of motion. Cardiovascular:      Rate and Rhythm: Normal rate and regular rhythm. Heart sounds: No murmur. Pulmonary:      Effort: Pulmonary effort is normal. No respiratory distress. Breath sounds: Normal breath sounds. Abdominal:      Palpations: Abdomen is soft. Tenderness: There is no abdominal tenderness. There is no guarding. Musculoskeletal:      Right knee: She exhibits normal range of motion. Left knee: She exhibits normal range of motion. Skin:     General: Skin is warm. Findings: No rash. Neurological:      Mental Status: She is alert and oriented to person, place, and time. GCS: GCS eye subscore is 4. GCS verbal subscore is 5. GCS motor subscore is 6. Sensory: Sensation is intact. Motor: Motor function is intact. No weakness. Gait: Gait is intact.    Psychiatric:         Behavior: Behavior normal.         DIAGNOSTIC RESULTS     Labs:  Results for orders placed or performed during the hospital encounter of 04/18/21   CBC auto differential   Result Value Ref Range    WBC 5.8 4.8 - 10.8 thou/mm3    RBC 4.44 4.20 - 5.40 mill/mm3    Hemoglobin 14.1 12.0 - 16.0 gm/dl    Hematocrit 40.9 37.0 - 47.0 %    MCV 92 81 - 99 fL    MCH 31.8 (H) 27.0 - 31.0 pg    MCHC 34.5 33.0 - 37.0 gm/dl    RDW 13.6 11.5 - 14.5 %    Platelets 504 691 - 066 thou/mm3    MPV 10.1 7.4 - 10.4 fL IMAGING:    No orders to display         EKG: None       URGENT CARE COURSE:     Vitals:    04/18/21 1338   BP: (!) 140/87   Pulse: 82   Resp: 19   Temp: 98 °F (36.7 °C)   SpO2: 98%   Weight: 290 lb (131.5 kg)   Height: 5' 4\" (1.626 m)       Medications - No data to display         PROCEDURES:  None    FINAL IMPRESSION      1. Excessive bleeding in premenopausal period    2. Dizziness          DISPOSITION/ PLAN      Records of previous inpatient admission were reviewed. Discussed with the patient that considering her symptoms and clinical findings, we will do her hemoglobin check. Blood work is reassuring at this time. Discussed that patient's heavy menses could be associated with her perimenopausal state. Advised patient to visit her OB/GYN as soon as for possible for evaluation if heavy menses continues to cause her symptoms or go to the ER. She is advised to rest and hydrate at home over the next 2 days. Patient agreeable with treatment plan.        PATIENT REFERRED TO:  PANTERA Correia CNP  [address] [place] [id number]      DISCHARGE MEDICATIONS:  New Prescriptions    No medications on file       Discontinued Medications    No medications on file       Current Discharge Medication List          PANTERA Puga CNP    (Please note that portions of this note were completed with a voice recognition program. Efforts were made to edit the dictations but occasionally words are mis-transcribed.)           PANTERA Puga CNP  04/18/21 1900 [facility], APRN - CNP  04/18/21 0087

## 2021-04-18 NOTE — ED NOTES
No change in patients condition. Discharge instructions and prescriptions discussed with pt. Pt. Verbalized understanding of info given and ambulated to exit in stable condition.       Justa King RN  04/18/21 9060

## 2021-04-18 NOTE — ED TRIAGE NOTES
Pt presents to  with c/o dizziness. Pt reports she was recently admitted to [facility] due to possible stroke - and admitted for 3 days. Pt reports she has not had a period for 10 months and just started today for the first time and it is very heavy - pt reports unknown if this is why she is dizzy. Pt reports she was working today when the dizziness started - pt reports she might have over done it. Pt reports she is very weak and having difficulty walking around due to not feeling well. Pt ambulatory to room for assessment. VS stable. Medications reviewed.

## 2021-04-19 LAB
BASOPHILS # BLD: 0.5 %
BASOPHILS ABSOLUTE: 0 THOU/MM3 (ref 0–0.1)
EOSINOPHIL # BLD: 1.1 %
EOSINOPHILS ABSOLUTE: 0.1 THOU/MM3 (ref 0–0.4)
IMMATURE GRANS (ABS): 0.02 THOU/MM3 (ref 0–0.07)
IMMATURE GRANULOCYTES: 0.3 %
LYMPHOCYTES # BLD: 31.4 %
LYMPHOCYTES ABSOLUTE: 1.8 THOU/MM3 (ref 1–4.8)
MONOCYTES # BLD: 6.2 %
MONOCYTES ABSOLUTE: 0.4 THOU/MM3 (ref 0.4–1.3)
NUCLEATED RED BLOOD CELLS: 0 /100 WBC
SEG NEUTROPHILS: 60.5 %
SEGMENTED NEUTROPHILS ABSOLUTE COUNT: 3.5 THOU/MM3 (ref 1.8–7.7)

## 2021-06-27 ENCOUNTER — HOSPITAL ENCOUNTER (EMERGENCY)
Age: 46
Discharge: HOME | End: 2021-06-27
Payer: COMMERCIAL

## 2021-06-27 VITALS
HEART RATE: 78 BPM | SYSTOLIC BLOOD PRESSURE: 133 MMHG | RESPIRATION RATE: 16 BRPM | WEIGHT: 278 LBS | OXYGEN SATURATION: 97 % | TEMPERATURE: 98 F | BODY MASS INDEX: 47.46 KG/M2 | HEIGHT: 64 IN | DIASTOLIC BLOOD PRESSURE: 76 MMHG

## 2021-06-27 DIAGNOSIS — N30.00 ACUTE CYSTITIS WITHOUT HEMATURIA: Primary | ICD-10-CM

## 2021-06-27 DIAGNOSIS — L25.5 DERMATITIS DUE TO PLANTS, INCLUDING POISON IVY, SUMAC, AND OAK: ICD-10-CM

## 2021-06-27 LAB
BILIRUBIN URINE: NEGATIVE
BLOOD, URINE: NEGATIVE
CHARACTER, URINE: CLEAR
COLOR: YELLOW
GLUCOSE URINE: NEGATIVE MG/DL
KETONES, URINE: NEGATIVE
LEUKOCYTE ESTERASE, URINE: ABNORMAL
NITRITE, URINE: NEGATIVE
PH UA: 7 (ref 5–9)
PROTEIN UA: NEGATIVE MG/DL
SPECIFIC GRAVITY UA: 1.02 (ref 1–1.03)
UROBILINOGEN, URINE: 0.2 EU/DL (ref 0.2–1)

## 2021-06-27 PROCEDURE — 87186 SC STD MICRODIL/AGAR DIL: CPT

## 2021-06-27 PROCEDURE — 81003 URINALYSIS AUTO W/O SCOPE: CPT

## 2021-06-27 PROCEDURE — 99213 OFFICE O/P EST LOW 20 MIN: CPT

## 2021-06-27 PROCEDURE — 87086 URINE CULTURE/COLONY COUNT: CPT

## 2021-06-27 PROCEDURE — 87077 CULTURE AEROBIC IDENTIFY: CPT

## 2021-06-27 PROCEDURE — 99213 OFFICE O/P EST LOW 20 MIN: CPT | Performed by: NURSE PRACTITIONER

## 2021-06-27 RX ORDER — NITROFURANTOIN 25; 75 MG/1; MG/1
100 CAPSULE ORAL 2 TIMES DAILY
Qty: 10 CAPSULE | Refills: 0 | Status: SHIPPED | OUTPATIENT
Start: 2021-06-27 | End: 2021-07-02

## 2021-06-27 RX ORDER — PHENAZOPYRIDINE HYDROCHLORIDE 200 MG/1
200 TABLET, FILM COATED ORAL 3 TIMES DAILY PRN
Qty: 9 TABLET | Refills: 0 | Status: SHIPPED | OUTPATIENT
Start: 2021-06-27 | End: 2021-06-30

## 2021-06-27 RX ORDER — SERTRALINE HYDROCHLORIDE 100 MG/1
100 TABLET, FILM COATED ORAL DAILY
Status: ON HOLD | COMMUNITY
End: 2021-09-06

## 2021-06-27 RX ORDER — PREDNISONE 10 MG/1
TABLET ORAL
Qty: 48 TABLET | Refills: 0 | Status: SHIPPED | OUTPATIENT
Start: 2021-06-27 | End: 2021-08-03

## 2021-06-27 ASSESSMENT — ENCOUNTER SYMPTOMS
ABDOMINAL PAIN: 1
WHEEZING: 1
SHORTNESS OF BREATH: 0
NAUSEA: 0
BACK PAIN: 0
COUGH: 0
VOMITING: 0
DIARRHEA: 0

## 2021-06-27 ASSESSMENT — PAIN SCALES - GENERAL: PAINLEVEL_OUTOF10: 8

## 2021-06-27 NOTE — ED PROVIDER NOTES
[facility]  Urgent Care Encounter       CHIEF COMPLAINT       Chief Complaint   Patient presents with    Dysuria    Rash       Nurses Notes reviewed and I agree except as noted in the HPI. HISTORY OF PRESENT ILLNESS   Radha Ahn is a 51 y.o. female who presents with complaints of the urinary urgency, frequency and dysuria that has been present since last night. She denies fever, chills, nausea vomiting, back pain, abdominal pain or flank pain. Patient was reports a rash believes is poison ivy. She was working in [employer] approximately 10 days ago and developed a rash 7 to 8 days ago. She has been using calamine lotion to the rash. The history is provided by the patient. REVIEW OF SYSTEMS     Review of Systems   Constitutional: Negative for chills, fatigue and fever. Respiratory: Positive for wheezing. Negative for cough and shortness of breath. Cardiovascular: Negative for chest pain. Gastrointestinal: Positive for abdominal pain. Negative for diarrhea, nausea and vomiting. Genitourinary: Positive for dysuria, frequency and urgency. Negative for flank pain and hematuria. Musculoskeletal: Negative for back pain. Skin: Positive for rash. Neurological: Negative for dizziness and headaches. PAST MEDICAL HISTORY         Diagnosis Date    Anxiety     Asthma     Bipolar 1 disorder ([facility] [address]     COPD (chronic obstructive pulmonary disease) (HCC)     Depression     PTSD (post-traumatic stress disorder)        SURGICALHISTORY     Patient  has a past surgical history that includes Tubal ligation.     CURRENT MEDICATIONS       Discharge Medication List as of 6/27/2021  5:08 PM      CONTINUE these medications which have NOT CHANGED    Details   buPROPion HCl (WELLBUTRIN PO) Take 50 mg by mouthHistorical Med      sertraline (ZOLOFT) 100 MG tablet Take 100 mg by mouth dailyHistorical Med      busPIRone (BUSPAR) 15 MG tablet Take 15 mg by mouth 3 times daily, Disp-90 tablet, R-1Normal      carBAMazepine (TEGRETOL XR) 400 MG extended release tablet Take 400 mg by mouth 2 times dailyHistorical Med      hydrOXYzine (VISTARIL) 25 MG capsule Take 50 mg by mouth 3 times daily as needed for ItchingHistorical Med      atorvastatin (LIPITOR) 10 MG tablet Take 10 mg by mouth dailyHistorical Med      aspirin 81 MG chewable tablet Take 81 mg by mouth dailyHistorical Med      albuterol sulfate HFA (VENTOLIN HFA) 108 (90 Base) MCG/ACT inhaler Inhale 2 puffs into the lungs every 6 hours as needed for WheezingHistorical Med      Multiple Vitamins-Minerals (THERAPEUTIC MULTIVITAMIN-MINERALS) tablet Take 1 tablet by mouth dailyHistorical Med      Ascorbic Acid (VITAMIN C) 500 MG CHEW Take 1,500 mg by mouth dailyHistorical Med      Flaxseed, Linseed, (FLAX SEED OIL) 1000 MG CAPS Take 1,000 mg by mouth dailyHistorical Med             ALLERGIES     Patient is is allergic to penicillins. Patients   There is no immunization history on file for this patient. FAMILY HISTORY     Patient's family history includes Asthma in her mother; COPD in her mother. SOCIAL HISTORY     Patient  reports that she has quit smoking. Her smoking use included cigarettes. She has a 10.00 pack-year smoking history. She has never used smokeless tobacco. She reports that she does not drink alcohol and does not use drugs. PHYSICAL EXAM     ED TRIAGE VITALS  BP: 133/76, Temp: 98 °F (36.7 °C), Pulse: 78, Resp: 16, SpO2: 97 %,Estimated body mass index is 47.72 kg/m² as calculated from the following:    Height as of this encounter: 5' 4\" (1.626 m). Weight as of this encounter: 278 lb (126.1 kg). ,Patient's last menstrual period was 04/18/2021. Physical Exam  Vitals and nursing note reviewed. Constitutional:       General: She is not in acute distress. Appearance: She is well-developed. HENT:      Head: Normocephalic and atraumatic.    Cardiovascular:      Rate and Rhythm: Normal rate and regular rhythm. Heart sounds: Normal heart sounds, S1 normal and S2 normal.   Pulmonary:      Effort: Pulmonary effort is normal. No respiratory distress. Breath sounds: Wheezing (Soft throughout) present. Abdominal:      General: Bowel sounds are normal.      Palpations: Abdomen is soft. Tenderness: There is abdominal tenderness in the suprapubic area. Skin:     General: Skin is warm and dry. Findings: Rash (Bilateral lower legs, ankles and bilateral forearms) present. Rash is papular and vesicular. Neurological:      General: No focal deficit present. Mental Status: She is alert and oriented to person, place, and time. Psychiatric:         Mood and Affect: Mood normal.         Speech: Speech normal.         Behavior: Behavior normal. Behavior is cooperative. DIAGNOSTIC RESULTS     Labs:  Results for orders placed or performed during the hospital encounter of 06/27/21   Urinalysis   Result Value Ref Range    Glucose, Ur Negative NEGATIVE mg/dl    Bilirubin Urine Negative NEGATIVE    Ketones, Urine Negative NEGATIVE    Specific Gravity, UA 1.025 1.002 - 1.030    Blood, Urine Negative NEGATIVE    pH, UA 7.00 5.0 - 9.0    Protein, UA Negative NEGATIVE mg/dl    Urobilinogen, Urine 0.20 0.2 - 1.0 eu/dl    Nitrite, Urine Negative NEGATIVE    Leukocyte Esterase, Urine Small (A) NEGATIVE    Color, UA Yellow STRAW-YELLOW    Character, Urine Clear CLEAR-SL CLOUD       IMAGING:    No orders to display         EKG:      URGENT CARE COURSE:     Vitals:    06/27/21 1647 06/27/21 1650   BP:  133/76   Pulse: 78    Resp: 16    Temp: 98 °F (36.7 °C)    TempSrc: Infrared    SpO2: 93% 97%   Weight: 278 lb (126.1 kg)    Height: 5' 4\" (1.626 m)        Medications - No data to display         PROCEDURES:  None    FINAL IMPRESSION      1. Acute cystitis without hematuria    2.  Dermatitis due to plants, including poison ivy, sumac, and oak          DISPOSITION/ PLAN     Patient presents with an acute urinary tract infection. She will be treated with nitrofurantoin. Urine culture will be completed and patient notified if antibiotic needs adjusted. She was also given prescription for Pyridium for symptom management. Patient also with a pruritic rash consistent with poison ivy related plant. Treat with tapering prednisone. Patient is to follow-up with family doctor in the next couple days if not improved with treatment. Further instructions were outlined verbally and in the patient's discharge instructions. All the patient's questions were answered. The patient/parent agreed with the plan and was discharged from the [facility] in good condition.       PATIENT REFERRED TO:  PANTERA Ruff - TONIE  [address] [facility] [address]      DISCHARGE MEDICATIONS:  Discharge Medication List as of 6/27/2021  5:08 PM      START taking these medications    Details   nitrofurantoin, macrocrystal-monohydrate, (MACROBID) 100 MG capsule Take 1 capsule by mouth 2 times daily for 5 days, Disp-10 capsule, R-0Normal      predniSONE (DELTASONE) 10 MG tablet Take 6 tablets for 3 days, then 4 tablets for 3 days, then 3 tablets for 3 days, then 2 tablets for 3 days, then 1 tablet for 3 days, stop, Disp-48 tablet, R-0Normal      phenazopyridine (PYRIDIUM) 200 MG tablet Take 1 tablet by mouth 3 times daily as needed for Pain, Disp-9 tablet, R-0Normal             Discharge Medication List as of 6/27/2021  5:08 PM          Discharge Medication List as of 6/27/2021  5:08 PM          Jaimee Day APRN - CNP    (Please note that portions of this note were completed with a voice recognition program. Efforts were made to edit the dictations but occasionally words are mis-transcribed.)         PANTERA Thompson - TONIE  06/27/21 6583

## 2021-06-28 LAB
ORGANISM: ABNORMAL
URINE CULTURE, ROUTINE: ABNORMAL

## 2021-07-19 ENCOUNTER — HOSPITAL ENCOUNTER (EMERGENCY)
Age: 46
Discharge: HOME | End: 2021-07-19
Payer: COMMERCIAL

## 2021-07-19 VITALS
OXYGEN SATURATION: 98 % | DIASTOLIC BLOOD PRESSURE: 87 MMHG | RESPIRATION RATE: 16 BRPM | BODY MASS INDEX: 46.61 KG/M2 | HEIGHT: 64 IN | WEIGHT: 273 LBS | TEMPERATURE: 97.8 F | SYSTOLIC BLOOD PRESSURE: 145 MMHG | HEART RATE: 72 BPM

## 2021-07-19 DIAGNOSIS — B37.31 VAGINAL YEAST INFECTION: Primary | ICD-10-CM

## 2021-07-19 PROCEDURE — 99213 OFFICE O/P EST LOW 20 MIN: CPT

## 2021-07-19 PROCEDURE — 99213 OFFICE O/P EST LOW 20 MIN: CPT | Performed by: NURSE PRACTITIONER

## 2021-07-19 RX ORDER — UBIDECARENONE 75 MG
50 CAPSULE ORAL DAILY
Status: ON HOLD | COMMUNITY
End: 2021-09-06

## 2021-07-19 RX ORDER — MICONAZOLE NITRATE 200 MG-2 %
COMBO PACK, PREFILLED APPL. AND CREAM VAGINAL NIGHTLY
Qty: 1 KIT | Refills: 0 | Status: SHIPPED | OUTPATIENT
Start: 2021-07-19 | End: 2021-08-03

## 2021-07-19 ASSESSMENT — ENCOUNTER SYMPTOMS
NAUSEA: 0
ABDOMINAL PAIN: 0

## 2021-07-19 NOTE — ED PROVIDER NOTES
[facility]  Urgent Care Encounter       CHIEF COMPLAINT       Chief Complaint   Patient presents with    Vaginal Discharge     milky white with abnormal smell hostry BV       Nurses Notes reviewed and I agree except as noted in the HPI. HISTORY OF PRESENT ILLNESS   Fraser Canavan is a 51 y.o. female who presents with complaints of vaginal discharge and a smelly odor. The history is provided by the patient. Vaginal Discharge  Quality:  White and thick  Severity:  Mild  Onset quality:  Sudden  Duration:  3 days  Timing:  Constant  Progression:  Unchanged  Chronicity:  New  Context: recent antibiotic use    Context: not during urination    Relieved by:  None tried  Worsened by:  Nothing  Ineffective treatments:  None tried  Associated symptoms: no abdominal pain, no dysuria, no fever, no nausea, no rash, no urinary frequency, no urinary hesitancy, no urinary incontinence and no vaginal itching        REVIEW OF SYSTEMS     Review of Systems   Constitutional: Negative for fever. Gastrointestinal: Negative for abdominal pain and nausea. Genitourinary: Positive for vaginal discharge. Negative for bladder incontinence, dysuria, flank pain, hematuria, hesitancy and vaginal bleeding. Neurological: Negative for dizziness. PAST MEDICAL HISTORY         Diagnosis Date    Anxiety     Asthma     B[facility] 1 disorder ([facility] [address]     COPD [facility]chronic obstructive pulmonary disease) ([facility])     Depression     PTSD (post-traumatic stress disorder)        SURGICALHISTORY     Patient  has a past surgical history that includes Tubal ligation.     CURRENT MEDICATIONS       Previous Medications    ALBUTEROL SULFATE HFA (VENTOLIN HFA) 108 (90 BASE) MCG/ACT INHALER    Inhale 2 puffs into the lungs every 6 hours as needed for Wheezing    ASCORBIC ACID (VITAMIN C) 500 MG CHEW    Take 1,500 mg by mouth daily    ASPIRIN 81 MG CHEWABLE TABLET    Take 81 mg by mouth daily    ATORVASTATIN (LIPITOR) 10 MG TABLET Take 10 mg by mouth daily    BUPROPION HCL (WELLBUTRIN PO)    Take 50 mg by mouth    BUSPIRONE (BUSPAR) 15 MG TABLET    Take 15 mg by mouth 3 times daily    CARBAMAZEPINE (TEGRETOL XR) 400 MG EXTENDED RELEASE TABLET    Take 400 mg by mouth 2 times daily    FLAXSEED, LINSEED, (FLAX SEED OIL) 1000 MG CAPS    Take 1,000 mg by mouth daily    HYDROXYZINE (VISTARIL) 25 MG CAPSULE    Take 50 mg by mouth 3 times daily as needed for Itching    MULTIPLE VITAMINS-MINERALS (THERAPEUTIC MULTIVITAMIN-MINERALS) TABLET    Take 1 tablet by mouth daily    PREDNISONE (DELTASONE) 10 MG TABLET    Take 6 tablets for 3 days, then 4 tablets for 3 days, then 3 tablets for 3 days, then 2 tablets for 3 days, then 1 tablet for 3 days, stop    SERTRALINE (ZOLOFT) 100 MG TABLET    Take 100 mg by mouth daily    VITAMIN B-12 (CYANOCOBALAMIN) 100 MCG TABLET    Take 50 mcg by mouth daily       ALLERGIES     Patient is is allergic to penicillins. Patients   There is no immunization history on file for this patient. FAMILY HISTORY     Patient's family history includes Asthma in her mother; COPD in her mother. SOCIAL HISTORY     Patient  reports that she quit smoking 8 days ago. Her smoking use included cigarettes. She has a 10.00 pack-year smoking history. She has never used smokeless tobacco. She reports that she does not drink alcohol and does not use drugs. PHYSICAL EXAM     ED TRIAGE VITALS  BP: (!) 145/87, Temp: 97.8 °F (36.6 °C), Pulse: 72, Resp: 16, SpO2: 98 %,Estimated body mass index is 46.86 kg/m² as calculated from the following:    Height as of this encounter: 5' 4\" (1.626 m). Weight as of this encounter: 273 lb (123.8 kg). ,Patient's last menstrual period was 04/18/2021. Physical Exam  Vitals and nursing note reviewed. Constitutional:       General: She is not in acute distress. Appearance: Normal appearance. Cardiovascular:      Rate and Rhythm: Normal rate and regular rhythm.    Pulmonary:      Effort: Pulmonary effort is normal.   Abdominal:      Palpations: Abdomen is soft. Tenderness: There is no abdominal tenderness. Genitourinary:     Comments: Deferred exam  Skin:     General: Skin is warm and dry. Neurological:      Mental Status: She is alert and oriented to person, place, and time. DIAGNOSTIC RESULTS     Labs:No results found for this visit on 07/19/21. IMAGING:  None    EKG:  None    URGENT CARE COURSE:     Vitals:    07/19/21 1149   BP: (!) 145/87   Pulse: 72   Resp: 16   Temp: 97.8 °F (36.6 °C)   SpO2: 98%   Weight: 273 lb (123.8 kg)   Height: 5' 4\" (1.626 m)       Medications - No data to display       PROCEDURES:  None    FINAL IMPRESSION      1. Vaginal yeast infection      DISPOSITION/ PLAN   DISPOSITION Decision To Discharge 07/19/2021 11:58:23 AM     Patient nontoxic no acute distress. Denies any urination problems. Denies possibility for STDs and refuses testing at this time. Recent antibiotic usage and current symptoms likely evident of vaginal yeast infection. Unable to prescribe patient Diflucan due to medication interaction with Tegretol. Explained to patient we will try her on Monistat, pack. Patient advised to follow-up with PCP if symptoms persist in 3 days. She voiced understanding was agreeable with above-mentioned plan. Patient was discharged in stable condition.     PATIENT REFERRED TO:  PANTERA Curran CNP  [address] [place] [id number]      DISCHARGE MEDICATIONS:  New Prescriptions    MICONAZOLE (MONISTAT 3 COMBINATION PACK) 200 & 2 MG-% (9GM) KIT KIT    Place vaginally nightly       Discontinued Medications    No medications on file       Current Discharge Medication List          PANTERA Harmon CNP    (Please note that portions of this note were completed with a voice recognition program. Efforts were made to edit the dictations but occasionally words are mis-transcribed.)           PANTERA Harmon CNP  07/19/21 1212

## 2021-08-03 ENCOUNTER — HOSPITAL ENCOUNTER (EMERGENCY)
Age: 46
Discharge: HOME | End: 2021-08-03
Payer: COMMERCIAL

## 2021-08-03 VITALS
TEMPERATURE: 97 F | BODY MASS INDEX: 49.26 KG/M2 | RESPIRATION RATE: 18 BRPM | HEART RATE: 80 BPM | OXYGEN SATURATION: 97 % | DIASTOLIC BLOOD PRESSURE: 67 MMHG | SYSTOLIC BLOOD PRESSURE: 129 MMHG | WEIGHT: 287 LBS

## 2021-08-03 DIAGNOSIS — N89.8 VAGINAL DISCHARGE: Primary | ICD-10-CM

## 2021-08-03 LAB — TRICHOMONAS PREP: NEGATIVE

## 2021-08-03 PROCEDURE — 87205 SMEAR GRAM STAIN: CPT

## 2021-08-03 PROCEDURE — 87591 N.GONORRHOEAE DNA AMP PROB: CPT

## 2021-08-03 PROCEDURE — 87070 CULTURE OTHR SPECIMN AEROBIC: CPT

## 2021-08-03 PROCEDURE — 87808 TRICHOMONAS ASSAY W/OPTIC: CPT

## 2021-08-03 PROCEDURE — 87491 CHLMYD TRACH DNA AMP PROBE: CPT

## 2021-08-03 PROCEDURE — 99213 OFFICE O/P EST LOW 20 MIN: CPT | Performed by: NURSE PRACTITIONER

## 2021-08-03 PROCEDURE — 99213 OFFICE O/P EST LOW 20 MIN: CPT

## 2021-08-03 RX ORDER — METRONIDAZOLE 500 MG/1
500 TABLET ORAL 2 TIMES DAILY
Qty: 14 TABLET | Refills: 0 | Status: SHIPPED | OUTPATIENT
Start: 2021-08-03 | End: 2021-08-10

## 2021-08-03 ASSESSMENT — ENCOUNTER SYMPTOMS
ABDOMINAL PAIN: 0
NAUSEA: 0
VOMITING: 0

## 2021-08-03 NOTE — ED PROVIDER NOTES
[facility]  [facility] En[facility]       CHIEF COMPLAINT       Chief Complaint   Patient presents with    Vaginal Discharge     White creamy discharge. Pt was here last week. Was given a cream, Still has problems. Nurses Notes reviewed and I agree except as noted in the HPI. HISTORY OF PRESENT ILLNESS   Viplu Lozano is a 51 y.o. female who presents for evaluation of a thick, creamy white vaginal discharge that is been ongoing for the past 1 to 2 weeks. Patient was seen in the urgent care last week and treated for a vaginal yeast infection. States that the discharge has continued. She states that she is sexually active with 1 male partner who has been her only sexual contact for the past 3 years. She denies any significant concerns for STD. She states that there is a mild odor to the discharge. She denies any fever, chills, abdominal pain, or urinary issues. The history is provided by the patient. REVIEW OF SYSTEMS     Review of Systems   Constitutional: Negative for chills and fever. Gastrointestinal: Negative for abdominal pain, nausea and vomiting. Genitourinary: Positive for vaginal discharge. Negative for dysuria. Musculoskeletal: Negative for myalgias. Skin: Negative for rash. Neurological: Negative for headaches. PAST MEDICAL HISTORY         Diagnosis Date    Anxiety     Asthma     Bipolar 1 disorder ([facility] [address]     COPD (chronic obstructive pulmonary disease) (HCC)     Depression     PTSD (post-traumatic stress disorder)        SURGICALHISTORY     Patient  has a past surgical history that includes Tubal ligation.     CURRENT MEDICATIONS       Previous Medications    ALBUTEROL SULFATE HFA (VENTOLIN HFA) 108 (90 BASE) MCG/ACT INHALER    Inhale 2 puffs into the lungs every 6 hours as needed for Wheezing    ASCORBIC ACID (VITAMIN C) 500 MG CHEW    Take 1,500 mg by mouth daily    ASPIRIN 81 MG CHEWABLE TABLET    Take 81 mg by mouth daily ATORVASTATIN (LIPITOR) 10 MG TABLET    Take 10 mg by mouth daily    BUPROPION HCL (WELLBUTRIN PO)    Take 50 mg by mouth    BUSPIRONE (BUSPAR) 15 MG TABLET    Take 15 mg by mouth 3 times daily    CARBAMAZEPINE (TEGRETOL XR) 400 MG EXTENDED RELEASE TABLET    Take 400 mg by mouth 2 times daily    FLAXSEED, LINSEED, (FLAX SEED OIL) 1000 MG CAPS    Take 1,000 mg by mouth daily    HYDROXYZINE (VISTARIL) 25 MG CAPSULE    Take 50 mg by mouth 3 times daily as needed for Itching    MULTIPLE VITAMINS-MINERALS (THERAPEUTIC MULTIVITAMIN-MINERALS) TABLET    Take 1 tablet by mouth daily    SERTRALINE (ZOLOFT) 100 MG TABLET    Take 100 mg by mouth daily    VITAMIN B-12 (CYANOCOBALAMIN) 100 MCG TABLET    Take 50 mcg by mouth daily       ALLERGIES     Patient is is allergic to penicillins. Patients   There is no immunization history on file for this patient. FAMILY HISTORY     Patient's family history includes Asthma in her mother; COPD in her mother. SOCIAL HISTORY     Patient  reports that she quit smoking about 3 weeks ago. Her smoking use included cigarettes. She has a 10.00 pack-year smoking history. She has never used smokeless tobacco. She reports that she does not drink alcohol and does not use drugs. PHYSICAL EXAM     ED TRIAGE VITALS  BP: 129/67, Temp: 97 °F (36.1 °C), Pulse: 80, Resp: 18, SpO2: 97 %,Estimated body mass index is 49.26 kg/m² as calculated from the following:    Height as of 7/19/21: 5' 4\" (1.626 m). Weight as of this encounter: 287 lb (130.2 kg). ,Patient's last menstrual period was 04/18/2021. Physical Exam  Vitals and nursing note reviewed. Constitutional:       General: She is not in acute distress. Appearance: She is well-developed. She is not diaphoretic. Eyes:      Conjunctiva/sclera:      Right eye: Right conjunctiva is not injected. Left eye: Left conjunctiva is not injected.       Pupils: Pupils are equal.   Cardiovascular:      Rate and Rhythm: Normal rate and regular rhythm. Heart sounds: No murmur heard. Pulmonary:      Effort: Pulmonary effort is normal. No respiratory distress. Breath sounds: Normal breath sounds. Abdominal:      General: Bowel sounds are normal.      Palpations: Abdomen is soft. Tenderness: There is no abdominal tenderness. Genitourinary:     Comments: Exam deferred  Musculoskeletal:      Cervical back: Normal range of motion. Right knee: Normal range of motion. Left knee: Normal range of motion. Skin:     General: Skin is warm. Findings: No rash. Neurological:      Mental Status: She is alert and oriented to person, place, and time. Psychiatric:         Behavior: Behavior normal.         DIAGNOSTIC RESULTS     Labs:  Results for orders placed or performed during the hospital encounter of 08/03/21   Trichomonas screen    Specimen: Vaginal   Result Value Ref Range    Trichomonas Prep NEGATIVE NEGATIVE       IMAGING:    No orders to display         EKG:  none    URGENT CARE COURSE:     Vitals:    08/03/21 0836 08/03/21 0904   BP: 129/67    Pulse:  80   Resp: 18    Temp: 97 °F (36.1 °C)    TempSrc: Temporal    SpO2: 97%    Weight: 287 lb (130.2 kg)        Medications - No data to display         PROCEDURES:  None    FINAL IMPRESSION      1. Vaginal discharge          DISPOSITION/ PLAN       Trichomonas screen is negative at this time. I discussed with the patient we will plan to treat with Flagyl for suspected bacterial vaginosis and she is advised that she will be notified of any changes in culture results over the next 7 days. She is advised remain free of any sexual activity until notified of results and is agreeable to plan as discussed.     PATIENT REFERRED TO:  Tiago Odell, PANTERA - CNP  [address] [facility] [address]      DISCHARGE MEDICATIONS:  New Prescriptions    METRONIDAZOLE (FLAGYL) 500 MG TABLET    Take 1 tablet by mouth 2 times daily for 7 days       Discontinued Medications    MICONAZOLE (MONISTAT 3 COMBINATION PACK) 200 & 2 MG-% (9GM) KIT KIT    Place vaginally nightly    PREDNISONE (DELTASONE) 10 MG TABLET    Take 6 tablets for 3 days, then 4 tablets for 3 days, then 3 tablets for 3 days, then 2 tablets for 3 days, then 1 tablet for 3 days, stop       Current Discharge Medication List          Teresia Opitz, APRN - CNP    (Please note that portions of this note were completed with a voice recognition program. Efforts were made to edit the dictations but occasionally words are mis-transcribed.)          Teresia Opitz, APRN - CNP  08/03/21 9714

## 2021-08-03 NOTE — ED NOTES
Pt instructed on self collection of vaginal swab. Pt verbalized understanding.      Keena Kearney RN  08/03/21 9869

## 2021-08-03 NOTE — ED TRIAGE NOTES
Pt walked to room 1. Pt here with complaints of vaginal odor, white creamy discharge. Pt was here last week and was given a cream. Pt finished cream and still has problems.

## 2021-08-06 LAB
CHLAMYDIA TRACHOMATIS AMPLIFIED DET: NEGATIVE
GENITAL CULTURE, ROUTINE: NORMAL
GRAM STAIN RESULT: NORMAL
NEISSERIA GONORRHOEAE BY AMP: NEGATIVE
SPECIMEN SOURCE: NORMAL

## 2021-09-05 ENCOUNTER — HOSPITAL ENCOUNTER (INPATIENT)
Age: 46
LOS: 5 days | Discharge: HOME | DRG: 753 | End: 2021-09-11
Attending: FAMILY MEDICINE | Admitting: PSYCHIATRY & NEUROLOGY
Payer: COMMERCIAL

## 2021-09-05 DIAGNOSIS — F23 ACUTE PSYCHOSIS (HCC): Primary | ICD-10-CM

## 2021-09-05 DIAGNOSIS — F14.10 NONDEPENDENT COCAINE ABUSE (HCC): ICD-10-CM

## 2021-09-05 DIAGNOSIS — F31.9 BIPOLAR 1 DISORDER (HCC): ICD-10-CM

## 2021-09-05 LAB
ALBUMIN SERPL-MCNC: 4.3 G/DL (ref 3.5–5.1)
ALP BLD-CCNC: 76 U/L (ref 38–126)
ALT SERPL-CCNC: 51 U/L (ref 11–66)
ANION GAP SERPL CALCULATED.3IONS-SCNC: 11 MEQ/L (ref 8–16)
AST SERPL-CCNC: 27 U/L (ref 5–40)
BASOPHILS # BLD: 0.5 %
BASOPHILS ABSOLUTE: 0 THOU/MM3 (ref 0–0.1)
BILIRUB SERPL-MCNC: 0.4 MG/DL (ref 0.3–1.2)
BUN BLDV-MCNC: 11 MG/DL (ref 7–22)
CALCIUM SERPL-MCNC: 9.3 MG/DL (ref 8.5–10.5)
CHLORIDE BLD-SCNC: 108 MEQ/L (ref 98–111)
CO2: 24 MEQ/L (ref 23–33)
CREAT SERPL-MCNC: 0.7 MG/DL (ref 0.4–1.2)
EKG ATRIAL RATE: 82 BPM
EKG P AXIS: 80 DEGREES
EKG P-R INTERVAL: 188 MS
EKG Q-T INTERVAL: 424 MS
EKG QRS DURATION: 88 MS
EKG QTC CALCULATION (BAZETT): 495 MS
EKG R AXIS: 36 DEGREES
EKG T AXIS: 60 DEGREES
EKG VENTRICULAR RATE: 82 BPM
EOSINOPHIL # BLD: 0.7 %
EOSINOPHILS ABSOLUTE: 0.1 THOU/MM3 (ref 0–0.4)
ERYTHROCYTE [DISTWIDTH] IN BLOOD BY AUTOMATED COUNT: 13.5 % (ref 11.5–14.5)
ERYTHROCYTE [DISTWIDTH] IN BLOOD BY AUTOMATED COUNT: 46.7 FL (ref 35–45)
ETHYL ALCOHOL, SERUM: < 0.01 %
GFR SERPL CREATININE-BSD FRML MDRD: 90 ML/MIN/1.73M2
GLUCOSE BLD-MCNC: 112 MG/DL (ref 70–108)
HCT VFR BLD CALC: 37.7 % (ref 37–47)
HEMOGLOBIN: 12.9 GM/DL (ref 12–16)
IMMATURE GRANS (ABS): 0.04 THOU/MM3 (ref 0–0.07)
IMMATURE GRANULOCYTES: 0.5 %
LYMPHOCYTES # BLD: 30 %
LYMPHOCYTES ABSOLUTE: 2.5 THOU/MM3 (ref 1–4.8)
MCH RBC QN AUTO: 32.7 PG (ref 26–33)
MCHC RBC AUTO-ENTMCNC: 34.2 GM/DL (ref 32.2–35.5)
MCV RBC AUTO: 95.4 FL (ref 81–99)
MONOCYTES # BLD: 7.1 %
MONOCYTES ABSOLUTE: 0.6 THOU/MM3 (ref 0.4–1.3)
NUCLEATED RED BLOOD CELLS: 0 /100 WBC
OSMOLALITY CALCULATION: 285.1 MOSMOL/KG (ref 275–300)
PLATELET # BLD: 236 THOU/MM3 (ref 130–400)
PMV BLD AUTO: 9.6 FL (ref 9.4–12.4)
POTASSIUM REFLEX MAGNESIUM: 3.9 MEQ/L (ref 3.5–5.2)
RBC # BLD: 3.95 MILL/MM3 (ref 4.2–5.4)
REASON FOR REJECTION: NORMAL
REJECTED TEST: NORMAL
SARS-COV-2, NAAT: NOT DETECTED
SEG NEUTROPHILS: 61.2 %
SEGMENTED NEUTROPHILS ABSOLUTE COUNT: 5 THOU/MM3 (ref 1.8–7.7)
SODIUM BLD-SCNC: 143 MEQ/L (ref 135–145)
TOTAL PROTEIN: 6.5 G/DL (ref 6.1–8)
TROPONIN T: < 0.01 NG/ML
WBC # BLD: 8.2 THOU/MM3 (ref 4.8–10.8)

## 2021-09-05 PROCEDURE — 85025 COMPLETE CBC W/AUTO DIFF WBC: CPT

## 2021-09-05 PROCEDURE — 84484 ASSAY OF TROPONIN QUANT: CPT

## 2021-09-05 PROCEDURE — 87635 SARS-COV-2 COVID-19 AMP PRB: CPT

## 2021-09-05 PROCEDURE — 80053 COMPREHEN METABOLIC PANEL: CPT

## 2021-09-05 PROCEDURE — 36415 COLL VENOUS BLD VENIPUNCTURE: CPT

## 2021-09-05 PROCEDURE — 82077 ASSAY SPEC XCP UR&BREATH IA: CPT

## 2021-09-05 PROCEDURE — 93005 ELECTROCARDIOGRAM TRACING: CPT | Performed by: FAMILY MEDICINE

## 2021-09-05 PROCEDURE — 99283 EMERGENCY DEPT VISIT LOW MDM: CPT

## 2021-09-05 PROCEDURE — 6360000002 HC RX W HCPCS: Performed by: FAMILY MEDICINE

## 2021-09-05 PROCEDURE — 96372 THER/PROPH/DIAG INJ SC/IM: CPT

## 2021-09-05 RX ORDER — LORAZEPAM 2 MG/ML
1 INJECTION INTRAMUSCULAR ONCE
Status: COMPLETED | OUTPATIENT
Start: 2021-09-05 | End: 2021-09-05

## 2021-09-05 RX ADMIN — LORAZEPAM 1 MG: 2 INJECTION INTRAMUSCULAR; INTRAVENOUS at 18:54

## 2021-09-05 ASSESSMENT — LIFESTYLE VARIABLES: HISTORY_ALCOHOL_USE: NO

## 2021-09-05 ASSESSMENT — PATIENT HEALTH QUESTIONNAIRE - PHQ9: SUM OF ALL RESPONSES TO PHQ QUESTIONS 1-9: 12

## 2021-09-05 ASSESSMENT — SLEEP AND FATIGUE QUESTIONNAIRES
DIFFICULTY STAYING ASLEEP: NO
DO YOU HAVE DIFFICULTY SLEEPING: YES
SLEEP PATTERN: DIFFICULTY FALLING ASLEEP;INSOMNIA
DIFFICULTY FALLING ASLEEP: YES
RESTFUL SLEEP: NO
DO YOU USE A SLEEP AID: YES
DIFFICULTY ARISING: NO

## 2021-09-05 NOTE — ED PROVIDER NOTES
EMERGENCY DEPARTMENT ENCOUNTER     CHIEF COMPLAINT   Chief Complaint   Patient presents with    Psychiatric Evaluation      [facility]   Vipul Lozano is a 51 y.o. female who presents acute psychosis and paranoia, associated with her 4 days of smoking crack cocaine. She is hearing voices telling her how she is worthless, now here for mental health evaluation. She denies alcohol abuse or self harm. She was apparently admitted to [facility] for some kind of medical evaluation in April 2021. She claims she had chest pain for a short bit and now is more anxious. REVIEW OF SYSTEMS   Psychiatric: +depression, cocaine abuse and auditory hallucinations; denies suicidal or homicidal Ideations   Respiratory:No difficulty breathing or new cough   General:No fevers   Cardiac: +chest pain  Neurologic:No known syncope   GI: No vomiting or abdominal pain   See HPI for further details. All other review of systems otherwise negative.      PAST MEDICAL & SURGICAL HISTORY   Past Medical History:   Diagnosis Date    Anxiety     Asthma     [facility]     COPD (chronic obstructive pulmonary disease) ([facility]     Depression     PTSD (post-traumatic stress disorder)       Past Surgical History:   Procedure Laterality Date    TUBAL LIGATION        CURRENT MEDICATIONS   Current Outpatient Rx   Medication Sig Dispense Refill    vitamin B-12 (CYANOCOBALAMIN) 100 MCG tablet Take 50 mcg by mouth daily      buPROPion HCl (WELLBUTRIN PO) Take 50 mg by mouth      sertraline (ZOLOFT) 100 MG tablet Take 100 mg by mouth daily      busPIRone (BUSPAR) 15 MG tablet Take 15 mg by mouth 3 times daily 90 tablet 1    carBAMazepine (TEGRETOL XR) 400 MG extended release tablet Take 400 mg by mouth 2 times daily      hydrOXYzine (VISTARIL) 25 MG capsule Take 50 mg by mouth 3 times daily as needed for Itching      atorvastatin (LIPITOR) 10 MG tablet Take 10 mg by mouth daily      aspirin 81 MG chewable tablet Take 81 mg by mouth daily      albuterol sulfate HFA (VENTOLIN HFA) 108 (90 Base) MCG/ACT inhaler Inhale 2 puffs into the lungs every 6 hours as needed for Wheezing      Multiple Vitamins-Minerals (THERAPEUTIC MULTIVITAMIN-MINERALS) tablet Take 1 tablet by mouth daily      Ascorbic Acid (VITAMIN C) 500 MG CHEW Take 1,500 mg by mouth daily      Flaxseed, Linseed, (FLAX SEED OIL) 1000 MG CAPS Take 1,000 mg by mouth daily        ALLERGIES   Allergies   Allergen Reactions    Penicillins Nausea And Vomiting      SOCIAL & FAMILYHISTORY   Social History     Socioeconomic History    Marital status: Single     Spouse name: Not on file    Number of children: Not on file    Years of education: Not on file    Highest education level: Not on file   Occupational History    Not on file   Tobacco Use    Smoking status: Former Smoker     Packs/day: 0.50     Years: 20.00     Pack years: 10.00     Types: Cigarettes     Quit date: 2021     Years since quittin.1    Smokeless tobacco: Never Used   Vaping Use    Vaping Use: Never used   Substance and Sexual Activity    Alcohol use: No    Drug use: No     Comment: clean 30 days marijuana and cocaine 2020    Sexual activity: Yes     Partners: Male     Comment: x 5 years   Other Topics Concern    Not on file   Social History Narrative    Not on file     Social Determinants of Health     Financial Resource Strain:     Difficulty of Paying Living Expenses:    Food Insecurity:     Worried About Running Out of Food in the Last Year:     Ran Out of Food in the Last Year:    Transportation Needs:     Lack of Transportation (Medical):      Lack of Transportation (Non-Medical):    Physical Activity:     Days of Exercise per Week:     Minutes of Exercise per Session:    Stress:     Feeling of Stress :    Social Connections:     Frequency of Communication with Friends and Family:     Frequency of Social Gatherings with Friends and Family:     Attends [employer] Services:     Active Member of Clubs or Organizations:     Attends Club or Organization Meetings:     Marital Status:    Intimate Partner Violence:     Fear of Current or Ex-Partner:     Emotionally Abused:     Physically Abused:     Sexually Abused:       Family History   Problem Relation Age of Onset    Asthma Mother     COPD Mother         PHYSICAL EXAM   VITAL SIGNS: BP (!) 149/102   Pulse 107   Temp 98.1 °F (36.7 °C) (Oral)   Resp 16   LMP 04/18/2021   SpO2 96%    Constitutional: Well developed, well nourished, no acute distress   Eyes: PERRL, conjunctiva normal   HENT: Atraumatic, external ears normal, nose normal   Neck: supple, No JVD   Respiratory: No respiratory distress, normal breath sounds   Cardiovascular: Normal rate, normal rhythm, no murmurs   GI: Soft, nondistended, normal bowel sounds, nontender   Musculoskeletal: No edema, no deformities   Integument: Well hydrated   Neurologic: Awake alert and oriented, no slurred speech, normal gross motor coordination and strength. Normal Gait. CN 3-12 intatct. Psychiatric: very anxious and labile affect, does not make good eye contact, no signs of toxidrome     EKG   EKG Interpretation. EKG Interpretation    Interpreted by emergency department physician on 9/5/21 19:00    Rhythm: normal sinus   Rate: 76 bpm  Axis: normal  Ectopy: none  Conduction: normal  ST Segments: no acute change  T Waves: no acute change  Q Waves: none    Clinical Impression: non-specific EKG without signs of ischemia      ED COURSE & MEDICAL DECISION MAKING   Pertinent Labs studies reviewed and interpreted. (See chart for details)   Consultation: Mental health Professional consulted in the emergency Department   Differential diagnoses: Metabolic emergency, infection, primary neurologic emergency, psychosis, behavioral psychiatric problem, toxidrome, illicit drug use, other   The patient is medically stable     FINAL IMPRESSION   1. Acute psychosis ([address]    2. Nondependent cocaine abuse ([address]    3. Bipolar 1 disorder ([facility] [address] [facility]         PLAN   MDM - there is concern for cocaine abuse and organic psychosis vs possible drug-induced psychosis. Based on my evaluation of her currently, she appeared to be having intrusive thoughts that might be due to overwhelming stress and underlying psychiatric issues. Will need to medically clear her from the chest pain angle, though she is currently complaining of anxiety and not chest pain. Pt did require a dose of IM ativan to calm her. Pt was admitted to psychiatry. Pt was medically cleared by me at 19:20, discussed with [facility].     Electronically signed by: Hadley York MD, 9/5/2021 8:11 PM     (This note was completed with a voice recognition program)         Daniel Mendoza MD  09/05/21 2011
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
## 2021-09-05 NOTE — ED NOTES
Bed: 025A  Expected date: 9/5/21  Expected time:   Means of arrival: [employer] Dept  Comments:      Evelia Sutherland RN  09/05/21 7430

## 2021-09-06 PROBLEM — F31.5 BIPOLAR DISORDER, CURR EPISODE DEPRESSED, SEVERE, W/PSYCHOTIC FEATURES (HCC): Status: ACTIVE | Noted: 2021-09-06

## 2021-09-06 PROBLEM — F31.4 BIPOLAR DISORDER WITH SEVERE DEPRESSION (HCC): Status: ACTIVE | Noted: 2021-09-06

## 2021-09-06 PROBLEM — F23 ACUTE PSYCHOSIS (HCC): Status: ACTIVE | Noted: 2021-09-06

## 2021-09-06 LAB
EKG ATRIAL RATE: 76 BPM
EKG P AXIS: 74 DEGREES
EKG P-R INTERVAL: 168 MS
EKG Q-T INTERVAL: 416 MS
EKG QRS DURATION: 86 MS
EKG QTC CALCULATION (BAZETT): 468 MS
EKG R AXIS: 24 DEGREES
EKG T AXIS: 53 DEGREES
EKG VENTRICULAR RATE: 76 BPM

## 2021-09-06 PROCEDURE — 6370000000 HC RX 637 (ALT 250 FOR IP): Performed by: PHYSICIAN ASSISTANT

## 2021-09-06 PROCEDURE — 6370000000 HC RX 637 (ALT 250 FOR IP): Performed by: PSYCHIATRY & NEUROLOGY

## 2021-09-06 PROCEDURE — APPSS30 APP SPLIT SHARED TIME 16-30 MINUTES: Performed by: PHYSICIAN ASSISTANT

## 2021-09-06 PROCEDURE — 94760 N-INVAS EAR/PLS OXIMETRY 1: CPT

## 2021-09-06 PROCEDURE — 93010 ELECTROCARDIOGRAM REPORT: CPT | Performed by: NUCLEAR MEDICINE

## 2021-09-06 PROCEDURE — 1240000000 HC EMOTIONAL WELLNESS R&B

## 2021-09-06 PROCEDURE — 90792 PSYCH DIAG EVAL W/MED SRVCS: CPT | Performed by: PSYCHIATRY & NEUROLOGY

## 2021-09-06 PROCEDURE — 94640 AIRWAY INHALATION TREATMENT: CPT

## 2021-09-06 RX ORDER — HYDROXYZINE HYDROCHLORIDE 25 MG/1
50 TABLET, FILM COATED ORAL 3 TIMES DAILY PRN
Status: DISCONTINUED | OUTPATIENT
Start: 2021-09-06 | End: 2021-09-11 | Stop reason: HOSPADM

## 2021-09-06 RX ORDER — BUSPIRONE HYDROCHLORIDE 10 MG/1
30 TABLET ORAL 2 TIMES DAILY
Status: DISCONTINUED | OUTPATIENT
Start: 2021-09-06 | End: 2021-09-11 | Stop reason: HOSPADM

## 2021-09-06 RX ORDER — ATORVASTATIN CALCIUM 40 MG/1
40 TABLET, FILM COATED ORAL NIGHTLY
Status: DISCONTINUED | OUTPATIENT
Start: 2021-09-06 | End: 2021-09-11 | Stop reason: HOSPADM

## 2021-09-06 RX ORDER — BUSPIRONE HYDROCHLORIDE 30 MG/1
1 TABLET ORAL 2 TIMES DAILY
COMMUNITY
Start: 2021-08-11 | End: 2022-02-05

## 2021-09-06 RX ORDER — HYDROXYZINE PAMOATE 50 MG/1
2 CAPSULE ORAL 2 TIMES DAILY
Status: ON HOLD | COMMUNITY
Start: 2021-07-06 | End: 2021-09-11 | Stop reason: HOSPADM

## 2021-09-06 RX ORDER — BUPROPION HYDROCHLORIDE 75 MG/1
75 TABLET ORAL
Status: DISCONTINUED | OUTPATIENT
Start: 2021-09-06 | End: 2021-09-08

## 2021-09-06 RX ORDER — ATORVASTATIN CALCIUM 40 MG/1
1 TABLET, FILM COATED ORAL NIGHTLY
COMMUNITY
Start: 2021-07-06 | End: 2022-02-05

## 2021-09-06 RX ORDER — CARBAMAZEPINE 100 MG/1
400 TABLET, EXTENDED RELEASE ORAL 2 TIMES DAILY
Status: DISCONTINUED | OUTPATIENT
Start: 2021-09-06 | End: 2021-09-11 | Stop reason: HOSPADM

## 2021-09-06 RX ORDER — MAGNESIUM HYDROXIDE/ALUMINUM HYDROXICE/SIMETHICONE 120; 1200; 1200 MG/30ML; MG/30ML; MG/30ML
30 SUSPENSION ORAL EVERY 6 HOURS PRN
Status: DISCONTINUED | OUTPATIENT
Start: 2021-09-06 | End: 2021-09-11 | Stop reason: HOSPADM

## 2021-09-06 RX ORDER — TRAZODONE HYDROCHLORIDE 50 MG/1
50 TABLET ORAL NIGHTLY PRN
Status: DISCONTINUED | OUTPATIENT
Start: 2021-09-06 | End: 2021-09-11 | Stop reason: HOSPADM

## 2021-09-06 RX ORDER — ASPIRIN 81 MG/1
81 TABLET, CHEWABLE ORAL DAILY
Status: DISCONTINUED | OUTPATIENT
Start: 2021-09-06 | End: 2021-09-11 | Stop reason: HOSPADM

## 2021-09-06 RX ORDER — ACETAMINOPHEN 325 MG/1
650 TABLET ORAL EVERY 4 HOURS PRN
Status: DISCONTINUED | OUTPATIENT
Start: 2021-09-06 | End: 2021-09-11 | Stop reason: HOSPADM

## 2021-09-06 RX ORDER — IBUPROFEN 400 MG/1
400 TABLET ORAL EVERY 6 HOURS PRN
Status: DISCONTINUED | OUTPATIENT
Start: 2021-09-06 | End: 2021-09-11 | Stop reason: HOSPADM

## 2021-09-06 RX ORDER — ALBUTEROL SULFATE 90 UG/1
2 AEROSOL, METERED RESPIRATORY (INHALATION) EVERY 6 HOURS PRN
Status: DISCONTINUED | OUTPATIENT
Start: 2021-09-06 | End: 2021-09-11 | Stop reason: HOSPADM

## 2021-09-06 RX ORDER — NICOTINE 21 MG/24HR
1 PATCH, TRANSDERMAL 24 HOURS TRANSDERMAL DAILY
Status: DISCONTINUED | OUTPATIENT
Start: 2021-09-06 | End: 2021-09-11 | Stop reason: HOSPADM

## 2021-09-06 RX ORDER — BUPROPION HYDROCHLORIDE 75 MG/1
1 TABLET ORAL
Status: ON HOLD | COMMUNITY
Start: 2021-08-24 | End: 2021-09-11 | Stop reason: HOSPADM

## 2021-09-06 RX ADMIN — IBUPROFEN 400 MG: 400 TABLET, FILM COATED ORAL at 07:47

## 2021-09-06 RX ADMIN — CARBAMAZEPINE 400 MG: 200 TABLET, EXTENDED RELEASE ORAL at 10:28

## 2021-09-06 RX ADMIN — ALBUTEROL SULFATE 2 PUFF: 90 AEROSOL, METERED RESPIRATORY (INHALATION) at 10:29

## 2021-09-06 RX ADMIN — ATORVASTATIN CALCIUM 40 MG: 40 TABLET, FILM COATED ORAL at 21:05

## 2021-09-06 RX ADMIN — TRAZODONE HYDROCHLORIDE 50 MG: 50 TABLET ORAL at 01:32

## 2021-09-06 RX ADMIN — HYDROXYZINE HYDROCHLORIDE 50 MG: 25 TABLET ORAL at 01:32

## 2021-09-06 RX ADMIN — HYDROXYZINE HYDROCHLORIDE 50 MG: 25 TABLET ORAL at 21:06

## 2021-09-06 RX ADMIN — BUSPIRONE HYDROCHLORIDE 30 MG: 10 TABLET ORAL at 10:28

## 2021-09-06 RX ADMIN — BUSPIRONE HYDROCHLORIDE 30 MG: 10 TABLET ORAL at 21:05

## 2021-09-06 RX ADMIN — ASPIRIN 81 MG: 81 TABLET, CHEWABLE ORAL at 10:27

## 2021-09-06 RX ADMIN — CARBAMAZEPINE 400 MG: 200 TABLET, EXTENDED RELEASE ORAL at 21:05

## 2021-09-06 RX ADMIN — IBUPROFEN 400 MG: 400 TABLET, FILM COATED ORAL at 23:06

## 2021-09-06 RX ADMIN — BUPROPION HYDROCHLORIDE 75 MG: 75 TABLET, FILM COATED ORAL at 16:54

## 2021-09-06 ASSESSMENT — SLEEP AND FATIGUE QUESTIONNAIRES
RESTFUL SLEEP: NO
DO YOU USE A SLEEP AID: YES
AVERAGE NUMBER OF SLEEP HOURS: 2
SLEEP PATTERN: DIFFICULTY FALLING ASLEEP;INSOMNIA
DIFFICULTY STAYING ASLEEP: NO
DO YOU HAVE DIFFICULTY SLEEPING: YES
DIFFICULTY ARISING: NO
DIFFICULTY FALLING ASLEEP: YES

## 2021-09-06 ASSESSMENT — PAIN DESCRIPTION - DESCRIPTORS
DESCRIPTORS: ACHING;DISCOMFORT
DESCRIPTORS: ACHING

## 2021-09-06 ASSESSMENT — PAIN DESCRIPTION - LOCATION
LOCATION: BACK
LOCATION: BACK

## 2021-09-06 ASSESSMENT — PAIN - FUNCTIONAL ASSESSMENT
PAIN_FUNCTIONAL_ASSESSMENT: ACTIVITIES ARE NOT PREVENTED
PAIN_FUNCTIONAL_ASSESSMENT: ACTIVITIES ARE NOT PREVENTED

## 2021-09-06 ASSESSMENT — PAIN DESCRIPTION - FREQUENCY
FREQUENCY: INTERMITTENT
FREQUENCY: INTERMITTENT

## 2021-09-06 ASSESSMENT — PAIN SCALES - GENERAL
PAINLEVEL_OUTOF10: 0
PAINLEVEL_OUTOF10: 5

## 2021-09-06 ASSESSMENT — PAIN DESCRIPTION - ONSET
ONSET: ON-GOING
ONSET: ON-GOING

## 2021-09-06 ASSESSMENT — PAIN DESCRIPTION - PAIN TYPE
TYPE: CHRONIC PAIN
TYPE: CHRONIC PAIN

## 2021-09-06 ASSESSMENT — PAIN DESCRIPTION - ORIENTATION
ORIENTATION: LOWER
ORIENTATION: LOWER

## 2021-09-06 ASSESSMENT — PAIN DESCRIPTION - PROGRESSION: CLINICAL_PROGRESSION: NOT CHANGED

## 2021-09-06 ASSESSMENT — PATIENT HEALTH QUESTIONNAIRE - PHQ9: SUM OF ALL RESPONSES TO PHQ QUESTIONS 1-9: 12

## 2021-09-06 ASSESSMENT — LIFESTYLE VARIABLES: HISTORY_ALCOHOL_USE: NO

## 2021-09-06 NOTE — PROGRESS NOTES
Psychosocial Assessment    Current Level of Psychosocial Functioning     Independent  XXX  Dependent    Minimal Assist     Comments:      Psychosocial High Risk Factors (check all that apply)    Unable to obtain meds   Chronic illness/pain    Substance abuse  XXX  Lack of Family Support   Financial stress   Isolation   Inadequate Community Resources  Suicide attempt(s) XXX  Not taking medications   Victim of crime   Developmental Delay  Unable to manage personal needs    Age 72 or older   Homeless  No transportation   Readmission within 30 days  Unemployment  Traumatic Event    Family/Supports identified: \"No one now\"    Sexual Orientation:  Heterosexual    Patient Strengths: Communication    Patient Barriers: Lack of support    Safety plan: On-going, Q15 minute safety checks    CMHC/MH history: See clinical summary for details    Plan of Care:  medication management, group/individual therapies, family meetings, psycho -education, treatment team meetings to assist with stabilization    Initial Discharge Plan:  Patient will return home and continue follow up with [address] [place] [address] Summary:      Patient is a 51year old female admitted to the unit from the ED on [facility]. Per [facility]: \"Officers responded to a 911 hang up in the [address]. Upon arrival Rina James was in front of [address] yelling that her family was inside being killed. Rinaradha Mejias was saying she could hear her grandkids screaming. Rina James then ran from [address] to [address] [facility] ran into [address]. She does not reside at [address] [facility] had to be removed from inside. Rinaradha James called he daughter. Her daughter advised she was at home safe and that Rina James has not been taking er medication. Rina James also advised she had used crack cocaine prior to officers arriving. Rina James also advised people were going to kill her. \" Patient reports current suicidal ideation with a plan to smoke crack cocaine until her heart explodes. Patient reports she has been feeling so overwhelmed and she doesn't know what to do. Patient's boyfriend broke up with her 4 days ago and her daughter has not talked to her in a week. Patient reports increased financial stress recently. Patient reports crack cocaine use, denies any other substance use. Homicidal thoughts denied. Patient is currently employed at [employer]. Patient is connected to outpatient services at [facility] in [address] [place] [address]. Patient currently resides alone in [address] [place] [address]

## 2021-09-06 NOTE — GROUP NOTE
Group Therapy Note    Date: 9/6/2021    Group Start Time: 1330  Group End Time: 5259  Group Topic: Psychotherapy    [facility] Adult Psych 4E    SUKH Olivia        Group Therapy Note           Patient provided with \"What is Filling Your Emotional Cup\" and reflection question worksheet that prompts emotional cup. Encouraged patient to complete worksheets and discuss with peers reflecting on good things about today, what to look forward to and things to let go of.        Signature:  SUKH Olivia

## 2021-09-06 NOTE — PLAN OF CARE
Problem: Altered Mood, Psychotic Behavior:  Goal: Able to verbalize decrease in frequency and intensity of hallucinations  Description: Able to verbalize decrease in frequency and intensity of hallucinations  Outcome: Ongoing  Note: Denied hallucinations at this time, will monitor  Goal: Able to verbalize reality based thinking  Description: Able to verbalize reality based thinking  Outcome: Met This Shift  Note: Patient oriented X4, verbalizes reality based thinking     Problem: Substance Abuse:  Goal: Participates in care planning  Description: Participates in care planning  Outcome: Met This Shift  Note: Patient participates in care planning     Problem: Depressive Behavior With or Without Suicide Precautions:  Goal: Able to verbalize and/or display a decrease in depressive symptoms  Description: Able to verbalize and/or display a decrease in depressive symptoms  Outcome: Ongoing  Note: Patient verbalizes having depressive symptoms, rates mood #3, has flat, depressed, sad affect, will continue to monitor  Goal: Ability to disclose and discuss suicidal ideas will improve  Description: Ability to disclose and discuss suicidal ideas will improve  Outcome: Ongoing  Note: Denies suicidal ideations at this time  Goal: Able to verbalize support systems  Description: Able to verbalize support systems  Outcome: Ongoing  Note: Patient verbalizes that she has no support at this time     Problem: Discharge Planning:  Goal: Discharged to appropriate level of care  Description: Discharged to appropriate level of care  Outcome: Ongoing  Note: Discharge planning yet to be discussed     Problem: Anxiety:  Goal: Level of anxiety will decrease  Description: Level of anxiety will decrease  Outcome: Ongoing  Note: Scheduled medication given for anxiety, see MAR     Problem:  Activity:  Goal: Sleeping patterns will improve  Description: Sleeping patterns will improve  Outcome: Ongoing  Note: Patient slept 4.5 hours during the night, will continue to monitor     Problem: Gas Exchange - Impaired:  Goal: Levels of oxygenation will improve  Description: Levels of oxygenation will improve  Outcome: Ongoing  Note: 02 sat 96%, see doc flow sheets     Problem: Pain:  Goal: Pain level will decrease  Description: Pain level will decrease  Outcome: Ongoing  Note: Medication given for pain, see MAR  Goal: Control of acute pain  Description: Control of acute pain  Outcome: Ongoing  Note: See above  Goal: Control of chronic pain  Description: Control of chronic pain  Outcome: Ongoing  Note: See above    Care plan reviewed with patient . Patient  verbalizes understanding of the plan of care and did not  contribute to goal setting.

## 2021-09-06 NOTE — PROGRESS NOTES
This RN has reviewed and agrees with Anupama Buckner LPN's data collection and has collaborated with this LPN regarding the patient's care plan.

## 2021-09-06 NOTE — PATIENT CARE CONFERENCE
[facility]  Initial Interdisciplinary Treatment Plan NOTE    Review Date & Time: 9/6/2021 7962    Patient was in treatment team.  See Multidisciplinary Treatment Team sheet for participants. Admission Type:   Admission Type: Involuntary    Reason for admission:  Reason for Admission: \"I called the police because I was hallucinating and thought my family was being killed after I took a bunch of crack to try to kill myself by blowing up my heart because my boyfriend broke up with me. \"      Estimated Length of Stay Update:  3-5 days  Estimated Discharge Date Update: 3-5 days    PATIENT STRENGTHS:  Patient Strengths Strengths: Connection to output provider, No significant Physical Illness  Patient Strengths and Limitations:Limitations: Difficulty problem solving/relies on others to help solve problems  Addictive Behavior:Addictive Behavior  In the past 3 months, have you felt or has someone told you that you have a problem with:  : None  Do you have a history of Chemical Use?: No  Do you have a history of Alcohol Use?: No  Do you have a history of Street Drug Abuse?: Yes  Histroy of Prescripton Drug Abuse?: No  Medical Problems:  Past Medical History:   Diagnosis Date    Anxiety     Asthma     [facility] ([facility]     COPD (chronic obstructive pulmonary disease) ([facility]     Depression     PTSD (post-traumatic stress disorder)        EDUCATION:   Learner Progress Toward Treatment Goals: Reviewed results and recommendations of this team, Reviewed group plan and strategies and Reviewed goals and plan of care    Method: Individual    Outcome: Verbalized understanding and Needs reinforcement    PATIENT GOALS: \"[place]\"    PLAN/TREATMENT RECOMMENDATIONS UPDATE:   1. What is the most important thing we can help you with while you are here? \"[place]\"  2. Who is your support system? \"Nobody now\"  3. Do you have follow-up providers? Emre  4.  Do you have the ability to pay for your medications? [address]  5. Where will you be residing when you leave the hospital?   Own apartment in [address] [place] [address] Dr  6. Will need a return to work slip or FMLA paper completion?    Yes      GOALS UPDATE:   Time frame for Short-Term Goals: Daily    SUKH Horowitz

## 2021-09-06 NOTE — PROGRESS NOTES
Behavioral Services  Medicare Certification Upon Admission    I certify that this patient's inpatient psychiatric hospital admission is medically necessary for:    [x] (1) Treatment which could reasonably be expected to improve this patient's condition,       [x] (2) Or for diagnostic study;     AND     [x](2) The inpatient psychiatric services are provided while the individual is under the care of a physician and are included in the individualized plan of care.     Estimated length of stay/service 3-5 days    Plan for post-hospital care [facility]    Electronically signed by Kalyn Khan MD on 9/6/2021 at 6:23 PM

## 2021-09-06 NOTE — PROGRESS NOTES
Spoke with patients medical provider. Patient is medically cleared. Spoke with Dr. Alex Glasgow. Patient to be admitted for inpatient treatment.

## 2021-09-06 NOTE — BH NOTE
Behavioral Health   Admission Note     Admission Type:   Admission Type: Involuntary    Reason for admission:  Reason for Admission: \"I called the police because I was hallucinating and thought my family was being killed after I took a bunch of crack to try to kill myself by blowing up my heart because my boyfriend broke up with me. \"    PATIENT STRENGTHS:  Strengths: Connection to output provider, No significant Physical Illness    Patient Strengths and Limitations:  Limitations: Difficulty problem solving/relies on others to help solve problems    Addictive Behavior:   Addictive Behavior  In the past 3 months, have you felt or has someone told you that you have a problem with:  : None  Do you have a history of Chemical Use?: No  Do you have a history of Alcohol Use?: No  Do you have a history of Street Drug Abuse?: Yes  Histroy of Prescripton Drug Abuse?: No    Medical Problems:   Past Medical History:   Diagnosis Date    Anxiety     Asthma     [facility] [address]     [facility]     Depression     PTSD (post-traumatic stress disorder)        Status EXAM:  Status and Exam  Normal: No  Facial Expression: Sad  Affect: Unstable  Level of Consciousness: Alert  Mood:Normal: No  Mood: Anxious, Depressed, Ashamed/humiliated  Motor Activity:Normal: No  Motor Activity: Decreased  Interview Behavior: Evasive  Preception: [place] to Person, [place] to Time, [place] to Place, [place] to Situation  Attention:Normal: No  Attention: Unable to Concentrate  Thought Processes: Circumstantial  Thought Content:Normal: Yes  Thought Content: Paranoia, Delusions  Hallucinations: None  Delusions: No  Delusions: Persecution  Memory:Normal: Yes  Memory: Poor Remote  Insight and Judgment: No  Insight and Judgment: Poor Judgment, Poor Insight  Present Suicidal Ideation: Yes  Present Homicidal Ideation: No    Pt admitted with followings belongings:  Dentures: None  Vision - Corrective Lenses: None  Hearing Aid: None  Jewelry: None  Body Piercings Removed: No  Clothing: Footwear, Pants, Shirt, Undergarments (Comment)  Were All Patient Medications Collected?: Not Applicable  Other Valuables: Cell phone, Other (Comment) (lighter)     Admission order obtained yes. Patient oriented to surroundings and program expectations and copy of patient rights given. Received admission packet:  yes. Consents reviewed, signed Refused. Refused yes. Patient verbalize understanding:  refused. Patient education on precautions: yes           Patient screened positive for suicide risk on CSSR-S (\"yes\" to question #4, 5, OR 6)  yes. Physician notified of risk score. Orders received q 15 minute checks . 2 person skin assessment completed upon admission refused, states has eczema. Explained patients right to have family, representative or physician notified of their admission. Patient has Declined for physician to be notified. Patient has Declined for family/representative to be notified. Provided pt with [employer] handout entitled \"Quitting Smoking. \"  Reviewed handout with pt addressing dangers of smoking, developing coping skills, and providing basic information about quitting. Pt response to counseling:  Refused    States that she had a recent break up with her boyfriend so she tried to overdose on crack cocaine to make her heart explode. She then was hallucinating and believed that her family was being hurt, so she called 911 and the ambulance brought her to the ED. States that she tries to overdose on crack to blow up her heart every time she feels suicidal. Alert and oriented x 4 now. Denies homicidal thoughts or plans. Denies hallucinations. Tearful. Ate a snack and states that she is tired and just wants to go to bed. Declines to participate further in the admission process, so much of the assessment was obtained from chart review and via report from [facility] AN AFFILIATE OF [facility]. Shown to room and bed.  States that she doesn't want a room mate because she is afraid, but promptly fell asleep.            Baldemar Brown RN
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
## 2021-09-06 NOTE — PROGRESS NOTES
0945: Assisted patient in contacting her employer, [employer]. Patient spoke to her employer via speakerphone and I confirmed that the patient is currently admitted. No paperwork is necessary, only a return to work slip when patient is discharged.

## 2021-09-07 PROCEDURE — 99232 SBSQ HOSP IP/OBS MODERATE 35: CPT | Performed by: PSYCHIATRY & NEUROLOGY

## 2021-09-07 PROCEDURE — 6370000000 HC RX 637 (ALT 250 FOR IP): Performed by: PHYSICIAN ASSISTANT

## 2021-09-07 PROCEDURE — 6370000000 HC RX 637 (ALT 250 FOR IP): Performed by: PSYCHIATRY & NEUROLOGY

## 2021-09-07 PROCEDURE — 1240000000 HC EMOTIONAL WELLNESS R&B

## 2021-09-07 PROCEDURE — 90833 PSYTX W PT W E/M 30 MIN: CPT | Performed by: PSYCHIATRY & NEUROLOGY

## 2021-09-07 PROCEDURE — 94640 AIRWAY INHALATION TREATMENT: CPT

## 2021-09-07 RX ADMIN — ALBUTEROL SULFATE 2 PUFF: 90 AEROSOL, METERED RESPIRATORY (INHALATION) at 22:19

## 2021-09-07 RX ADMIN — ASPIRIN 81 MG: 81 TABLET, CHEWABLE ORAL at 08:47

## 2021-09-07 RX ADMIN — CARBAMAZEPINE 400 MG: 200 TABLET, EXTENDED RELEASE ORAL at 20:44

## 2021-09-07 RX ADMIN — BUSPIRONE HYDROCHLORIDE 30 MG: 10 TABLET ORAL at 20:44

## 2021-09-07 RX ADMIN — ALBUTEROL SULFATE 2 PUFF: 90 AEROSOL, METERED RESPIRATORY (INHALATION) at 10:47

## 2021-09-07 RX ADMIN — MAGNESIUM HYDROXIDE 30 ML: 400 SUSPENSION ORAL at 13:02

## 2021-09-07 RX ADMIN — BUSPIRONE HYDROCHLORIDE 30 MG: 10 TABLET ORAL at 08:47

## 2021-09-07 RX ADMIN — HYDROXYZINE HYDROCHLORIDE 50 MG: 25 TABLET ORAL at 20:44

## 2021-09-07 RX ADMIN — BUPROPION HYDROCHLORIDE 75 MG: 75 TABLET, FILM COATED ORAL at 17:16

## 2021-09-07 RX ADMIN — HYDROXYZINE HYDROCHLORIDE 50 MG: 25 TABLET ORAL at 13:02

## 2021-09-07 RX ADMIN — CARBAMAZEPINE 400 MG: 200 TABLET, EXTENDED RELEASE ORAL at 08:47

## 2021-09-07 RX ADMIN — IBUPROFEN 400 MG: 400 TABLET, FILM COATED ORAL at 20:49

## 2021-09-07 RX ADMIN — ATORVASTATIN CALCIUM 40 MG: 40 TABLET, FILM COATED ORAL at 20:44

## 2021-09-07 RX ADMIN — IBUPROFEN 400 MG: 400 TABLET, FILM COATED ORAL at 08:47

## 2021-09-07 ASSESSMENT — PAIN DESCRIPTION - ONSET
ONSET: ON-GOING
ONSET: ON-GOING

## 2021-09-07 ASSESSMENT — PAIN SCALES - GENERAL
PAINLEVEL_OUTOF10: 5
PAINLEVEL_OUTOF10: 5
PAINLEVEL_OUTOF10: 2

## 2021-09-07 ASSESSMENT — PAIN DESCRIPTION - DESCRIPTORS
DESCRIPTORS: ACHING
DESCRIPTORS: ACHING

## 2021-09-07 ASSESSMENT — PAIN DESCRIPTION - FREQUENCY
FREQUENCY: INTERMITTENT
FREQUENCY: INTERMITTENT

## 2021-09-07 ASSESSMENT — PAIN DESCRIPTION - PAIN TYPE
TYPE: CHRONIC PAIN
TYPE: CHRONIC PAIN

## 2021-09-07 ASSESSMENT — PAIN DESCRIPTION - DIRECTION: RADIATING_TOWARDS: BILATERAL HIPS

## 2021-09-07 ASSESSMENT — PAIN DESCRIPTION - ORIENTATION
ORIENTATION: LOWER
ORIENTATION: LOWER

## 2021-09-07 ASSESSMENT — PAIN DESCRIPTION - LOCATION
LOCATION: BACK
LOCATION: BACK

## 2021-09-07 ASSESSMENT — PAIN DESCRIPTION - PROGRESSION
CLINICAL_PROGRESSION: NOT CHANGED
CLINICAL_PROGRESSION: NOT CHANGED

## 2021-09-07 NOTE — PLAN OF CARE
Patient has attended all of the groups this shift and has also been out of her room for social interaction with others today so she has met her socialization goal.

## 2021-09-07 NOTE — PROGRESS NOTES
Group Therapy Note    Date: 9/6/2021  Start Time: 2000  End Time:  2020    Type of Group: Relaxation    Notes:  cooperative    Status After Intervention:  Unchanged    Participation Level: Minimal    Participation Quality: Appropriate      Speech:  normal      Thought Process/Content: Logical      Affective Functioning: Blunted and Flat      Mood: anxious and depressed      Level of consciousness:  Alert      Response to Learning: Capable of insight      Endings: None Reported    Modes of Intervention: Education and Support      Discipline Responsible: Registered Nurse      Signature:  Toni Alonzo RN

## 2021-09-07 NOTE — GROUP NOTE
Group Therapy Note    Date: 9/7/2021    Group Start Time: 1330  Group End Time: 4611  Group Topic: Psychotherapy    [facility] 4E    SUKH Betancourt        Group Therapy Note    Attendees: 6         Patient's Goal:  Discuss life experiences, supports and ways to deal with increased stress and overwhelming emotions. Notes:  Patient contributed to group conversation in a positive way. Patient encouraged and supported peers in their sharing. Status After Intervention:  Improved    Participation Level:  Active Listener and Interactive    Participation Quality: Appropriate, Attentive, Sharing and Supportive      Speech:  normal      Thought Process/Content: Logical      Affective Functioning: Congruent      Mood: euthymic      Level of consciousness:  Alert, Oriented x4 and Attentive      Response to Learning: Able to verbalize current knowledge/experience, Able to verbalize/acknowledge new learning, Able to retain information, Capable of insight, Able to change behavior and Progressing to goal      Endings: None Reported    Modes of Intervention: Support and Socialization      Discipline Responsible: /Counselor      Signature:  SUKH Betancourt

## 2021-09-07 NOTE — BH NOTE
INPATIENT RECREATIONAL THERAPY  ADULT BEHAVIORAL SERVICES  EVALUATION    REFERRING PHYSICIAN:   Dr. Larissa Petit  DIAGNOSIS:    Bipolar Disorder, Current Episode Depressed, Severe, with Psychotic Features  PRECAUTIONS:    Standard precautions    HISTORY OF PRESENT ILLNESS/INJURY:    Patient was admitted to the unit due to acute psychosis and paranoia. Patient reported that she was also having auditory hallucinations. Patient stated that she was afraid that people were trying to kill her and her family prior to admission. Patient reported that she has been abusing crack cocaine. Patient stated that she has stress due to a recent break-up with her boyfriend 4 days ago. Patient reported work stress as well due to working too much. Patient reported that she has a lot of anxiety and has not been sleeping well. Patient was pleasant and cooperative at time of evaluation. PMH:  Please see medical chart for prior medical history, allergies, and medication    HISTORY OF PSYCHIATRIC TREATMENT:  OP:  Zane  OF BIRTH:   [birth year]  GENDER:   Female  MARITAL STATUS:   with 3 children  EMPLOYMENT STATUS:    Employed at [employer]. LIVING SITUATION/SUPPORT:   Lives alone in [place], [place].  EDUCATIONAL LEVEL:   Associates Degree in Accounting    MEDICATION/DRUG USE:  Crack cocaine abuse. Noncompliance with medications. LEISURE INTERESTS:  family activities, activities with her friends,  spiritual activities, listening to music, coloring and other arts/crafts, word search puzzles, watching TV and movies  ACTIVITY PREFERENCE:    No preference  ACTIVITY TYPES:   Passive. Indoor. Outdoor. Active. COGNITION:   A&Ox4    COPING:   poor  ATTENTION:  fair  RELAXATION:  Patient reported anxiety and poor sleep.    SELF-ESTEEM:  fair  MOTIVATION:   fair    SOCIAL SKILLS:   fair  FRUSTRATION TOLERANCE:   fair  ATTENTION SEEKING:  None noted  COOPERATION:  Cooperative and pleasant  AFFECT:  blunt  APPEARANCE:   Disheveled HEARING:   No problems noted  VISION:   No problems noted  VERBAL COMMUNICATION:   No problems  WRITTEN COMMUNICATION:   No problems    COORDINATION:   Patient has COPD. MOBILITY:    Ambulates independently    GOALS:     Identify at least 2 new positive coping skills by time of discharge.

## 2021-09-07 NOTE — GROUP NOTE
Group Therapy Note    Date: 9/7/2021    Group Start Time: 1100  Group End Time: Boeing  Group Topic: Healthy Living/Wellness    [facility] 4E    Vandana Virgen RN        Group Therapy Note    Attendees: 6     Notes:  Patient attended group facilitated by nursing students.       Discipline Responsible: Registered Nurse      Signature:  Vandana Virgen RN

## 2021-09-07 NOTE — BH NOTE
PLAN OF CARE:     Start Time: 0900  End Time:  0930    Group Topic:  Daily Goals    Group Type:   Goal Group    Intervention/Goal:  To increase support and identify daily goals    Attendance:  attended    Affect:    blunt    Behavior:  Cooperative     Response:  appropriate    Daily Goal:  To call HR at [facility] today.      Progress:   Progressing to goal

## 2021-09-07 NOTE — PLAN OF CARE
Problem: Altered Mood, Psychotic Behavior:  Goal: Able to verbalize decrease in frequency and intensity of hallucinations  Description: Able to verbalize decrease in frequency and intensity of hallucinations  9/7/2021 1057 by Nasim Barker RN  Outcome: Ongoing  Note: Patient denies hallucinations this am.     Problem: Altered Mood, Psychotic Behavior:  Goal: Able to verbalize reality based thinking  Description: Able to verbalize reality based thinking  9/7/2021 1057 by Nasim Barker RN  Outcome: Ongoing  Note: Patient alert and oriented x 4. Problem: Substance Abuse:  Goal: Participates in care planning  Description: Participates in care planning  9/7/2021 1057 by Nasim Barker RN  Outcome: Ongoing  Note: Patient denies withdrawal symptoms so far this shift. Problem: Depressive Behavior With or Without Suicide Precautions:  Goal: Able to verbalize and/or display a decrease in depressive symptoms  Description: Able to verbalize and/or display a decrease in depressive symptoms  9/7/2021 1057 by Nasim Barker RN  Outcome: Ongoing  Note: Patient reports mood 7/10 with 10 being normal. Has flat and blunt affect. Speech clear. Good eye contact. Reports \"hope so\" regarding hope for future and identifies no one as her support system. Problem: Depressive Behavior With or Without Suicide Precautions:  Goal: Ability to disclose and discuss suicidal ideas will improve  Description: Ability to disclose and discuss suicidal ideas will improve  9/7/2021 1057 by Nasim Barker RN  Outcome: Ongoing  Note: Patient denies suicidal ideations, no plan or intent to harm self. Patient remains on suicidal precautions 15 checks for safety. Instructed to seek staff as needed for thoughts of self harm.        Problem: Depressive Behavior With or Without Suicide Precautions:  Goal: Able to verbalize support systems  Description: Able to verbalize support systems  9/7/2021 1057 by Nasim Barker RN  Outcome: Ongoing  Note: Patient reports no one as their support system. Problem: Discharge Planning:  Goal: Discharged to appropriate level of care  Description: Discharged to appropriate level of care  9/7/2021 1057 by Socorro Oates RN  Outcome: Ongoing  Note: Patient voices she needs a place to go before discharge. Patient states \"I am scared to go back to [place], I am terrified\". Discharge planner working with patient to achieve optimal discharge plans, specific to individual needs. Problem: Anxiety:  Goal: Level of anxiety will decrease  Description: Level of anxiety will decrease  9/7/2021 1057 by Socorro Oates RN  Outcome: Ongoing  Note: Patient denies anxiety so far this shift. Problem: Activity:  Goal: Sleeping patterns will improve  Description: Sleeping patterns will improve  9/7/2021 1057 by Socorro Oates RN  Outcome: Ongoing  Note: Patient slept 7.5 hours last night, reports she did not slept \"I was up and down\". Encourage patient not to take naps during the day, relax several hours before bed and to take prescribed sleep meds as ordered. Patient verbalized understanding. Problem: Gas Exchange - Impaired:  Goal: Levels of oxygenation will improve  Description: Levels of oxygenation will improve  9/7/2021 1057 by Socorro Oates RN  Outcome: Ongoing  Note: Patient requested PRN albuterol inhaler, pulse ox 98% on room air. Problem: Pain:  Goal: Pain level will decrease  Description: Pain level will decrease  9/7/2021 1057 by Socorro Oates RN  Outcome: Ongoing  Note: Pain Assessment: 0-10  Pain Level: 5   Patient's Stated Pain Goal: No pain   Is pain goal met at this time? Yes   Patient reports chronic lower back pain, taking motrin as needed, reports decrease pain. Non-Pharmaceutical Pain Intervention(s): Rest, Repositioned    Care plan reviewed with patient. Patient verbalize understanding of the plan of care and contribute to goal setting.
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
## 2021-09-07 NOTE — BH NOTE
Group Therapy Note    Date: 9/7/2021  Start Time:  1000  End Time:   6805  Number of Participants: 6    Type of Group: Recreational/Social    Patient's Goal:  Increase socialization. Notes:   Patient participated in a group activity and was social with others during group.      Status After Intervention:  Improved    Participation Level: Interactive    Participation Quality: Appropriate, Attentive and Sharing      Speech:  normal      Thought Process/Content: Logical      Affective Functioning: Blunted      Mood: anxious and irritable      Level of consciousness:  Alert      Response to Learning: Progressing to goal      Endings: None Reported    Modes of Intervention: Socialization and Activity      Discipline Responsible: Psychoeducational Specialist      Signature:  Cody Mclaughlin

## 2021-09-07 NOTE — PLAN OF CARE
Problem: Altered Mood, Psychotic Behavior:  Goal: Able to verbalize decrease in frequency and intensity of hallucinations  Description: Able to verbalize decrease in frequency and intensity of hallucinations  9/6/2021 2234 by Paulino Hanna RN  Outcome: Ongoing  Note: the patient denies hallucinations. 9/6/2021 1129 by Bernard Rain LPN  Outcome: Ongoing  Note: Denied hallucinations at this time, will monitor  Goal: Able to verbalize reality based thinking  Description: Able to verbalize reality based thinking  9/6/2021 2234 by Paulino Hanna RN  Outcome: Ongoing  Note: the patient is alert and oriented to person, place, time and situation. 9/6/2021 1129 by Bernard Rain LPN  Outcome: Met This Shift  Note: Patient oriented X4, verbalizes reality based thinking     Problem: Substance Abuse:  Goal: Participates in care planning  Description: Participates in care planning  9/6/2021 2234 by Paulino Hanna RN  Outcome: Ongoing  Note: The patient participates in interdisciplinary care planning. 9/6/2021 1129 by Bernard Rain LPN  Outcome: Met This Shift  Note: Patient participates in care planning     Problem: Discharge Planning:  Goal: Discharged to appropriate level of care  Description: Discharged to appropriate level of care  9/6/2021 2234 by Paulino Hanna RN  Outcome: Ongoing  Note: Patient will be discharged to safe, appropriate level of care. Patient will work with interdisciplinary team towards meeting discharge needs. 9/6/2021 1129 by Bernard Rain LPN  Outcome: Ongoing  Note: Discharge planning yet to be discussed     Problem: Anxiety:  Goal: Level of anxiety will decrease  Description: Level of anxiety will decrease  9/6/2021 2234 by Paulino Hanna RN  Outcome: Ongoing  Note: the patient states she is anxious and requested a PRN for anxiety at bedtime.    9/6/2021 1129 by Bernard Rain LPN  Outcome: Ongoing  Note: Scheduled medication given for anxiety, see MAR     Problem: Depressive Behavior With or Without Suicide Precautions:  Goal: Able to verbalize and/or display a decrease in depressive symptoms  Description: Able to verbalize and/or display a decrease in depressive symptoms  9/6/2021 2234 by Jessenia Perla RN  Outcome: Ongoing  Note: the patient states her mood is a 3 out of 10 and she states she feels anxious and depressed. 9/6/2021 1129 by Tracy Byrnes LPN  Outcome: Ongoing  Note: Patient verbalizes having depressive symptoms, rates mood #3, has flat, depressed, sad affect, will continue to monitor  Goal: Ability to disclose and discuss suicidal ideas will improve  Description: Ability to disclose and discuss suicidal ideas will improve  9/6/2021 2234 by Jessenia Perla RN  Outcome: Ongoing  Note: The patient denies suicidal ideation. 9/6/2021 1129 by Tracy Byrnes LPN  Outcome: Ongoing  Note: Denies suicidal ideations at this time  Goal: Able to verbalize support systems  Description: Able to verbalize support systems  9/6/2021 2234 by Jessenia Perla RN  Outcome: Ongoing  Note: the patient states she has no support system. 9/6/2021 1129 by Tracy Byrnes LPN  Outcome: Ongoing  Note: Patient verbalizes that she has no support at this time     Problem: Activity:  Goal: Sleeping patterns will improve  Description: Sleeping patterns will improve  9/6/2021 2234 by Jessenia Perla RN  Outcome: Ongoing  Note: the patient slept 4.5 continuous hours last night and did not voice any sleep concerns. 9/6/2021 1129 by Tracy Byrnes LPN  Outcome: Ongoing  Note: Patient slept 4.5 hours during the night, will continue to monitor     Problem: Gas Exchange - Impaired:  Goal: Levels of oxygenation will improve  Description: Levels of oxygenation will improve  9/6/2021 2234 by Jessenia Perla RN  Outcome: Ongoing  Note: the patient oxygen saturation was 97% this evening.   9/6/2021 1129 by Tracy Byrnes LPN  Outcome: Ongoing  Note: 60 sat 96%, see doc flow sheets     Problem: Pain:  Goal: Pain level will decrease  Description: Pain level will decrease  9/6/2021 2234 by Allan David RN  Outcome: Ongoing  Note: the patient rates pain as a 5 out of 10 and states it is chronic pain in her lower back. 9/6/2021 1129 by Luisa Whatley LPN  Outcome: Ongoing  Note: Medication given for pain, see MAR  Goal: Control of acute pain  Description: Control of acute pain  9/6/2021 2234 by Allan David RN  Outcome: Ongoing  9/6/2021 1129 by Luisa Whatley LPN  Outcome: Ongoing  Note: See above  Goal: Control of chronic pain  Description: Control of chronic pain  9/6/2021 2234 by Allan David RN  Outcome: Ongoing  9/6/2021 1129 by Luisa Whatley LPN  Outcome: Ongoing  Note: See above   Care plan reviewed with patient. Patient verbalize understanding of the plan of care and contribute to goal setting.

## 2021-09-08 PROCEDURE — 6370000000 HC RX 637 (ALT 250 FOR IP): Performed by: PSYCHIATRY & NEUROLOGY

## 2021-09-08 PROCEDURE — 6370000000 HC RX 637 (ALT 250 FOR IP): Performed by: PHYSICIAN ASSISTANT

## 2021-09-08 PROCEDURE — 99232 SBSQ HOSP IP/OBS MODERATE 35: CPT | Performed by: PSYCHIATRY & NEUROLOGY

## 2021-09-08 PROCEDURE — 1240000000 HC EMOTIONAL WELLNESS R&B

## 2021-09-08 PROCEDURE — 90833 PSYTX W PT W E/M 30 MIN: CPT | Performed by: PSYCHIATRY & NEUROLOGY

## 2021-09-08 RX ORDER — DULOXETIN HYDROCHLORIDE 20 MG/1
20 CAPSULE, DELAYED RELEASE ORAL DAILY
Status: DISCONTINUED | OUTPATIENT
Start: 2021-09-08 | End: 2021-09-09

## 2021-09-08 RX ADMIN — ASPIRIN 81 MG: 81 TABLET, CHEWABLE ORAL at 08:33

## 2021-09-08 RX ADMIN — CARBAMAZEPINE 400 MG: 200 TABLET, EXTENDED RELEASE ORAL at 08:32

## 2021-09-08 RX ADMIN — BUSPIRONE HYDROCHLORIDE 30 MG: 10 TABLET ORAL at 20:14

## 2021-09-08 RX ADMIN — BUSPIRONE HYDROCHLORIDE 30 MG: 10 TABLET ORAL at 08:33

## 2021-09-08 RX ADMIN — IBUPROFEN 400 MG: 400 TABLET, FILM COATED ORAL at 08:32

## 2021-09-08 RX ADMIN — ATORVASTATIN CALCIUM 40 MG: 40 TABLET, FILM COATED ORAL at 20:14

## 2021-09-08 RX ADMIN — MAGNESIUM HYDROXIDE 30 ML: 400 SUSPENSION ORAL at 08:32

## 2021-09-08 RX ADMIN — HYDROXYZINE HYDROCHLORIDE 50 MG: 25 TABLET ORAL at 20:17

## 2021-09-08 RX ADMIN — CARBAMAZEPINE 400 MG: 200 TABLET, EXTENDED RELEASE ORAL at 20:14

## 2021-09-08 RX ADMIN — HYDROXYZINE HYDROCHLORIDE 50 MG: 25 TABLET ORAL at 08:32

## 2021-09-08 RX ADMIN — DULOXETINE HYDROCHLORIDE 20 MG: 20 CAPSULE, DELAYED RELEASE ORAL at 11:56

## 2021-09-08 ASSESSMENT — PAIN DESCRIPTION - FREQUENCY: FREQUENCY: INTERMITTENT

## 2021-09-08 ASSESSMENT — PAIN DESCRIPTION - ONSET: ONSET: ON-GOING

## 2021-09-08 ASSESSMENT — PAIN SCALES - GENERAL
PAINLEVEL_OUTOF10: 2
PAINLEVEL_OUTOF10: 0
PAINLEVEL_OUTOF10: 6
PAINLEVEL_OUTOF10: 6

## 2021-09-08 ASSESSMENT — PAIN DESCRIPTION - DIRECTION: RADIATING_TOWARDS: BILATERAL HIPS

## 2021-09-08 ASSESSMENT — PAIN DESCRIPTION - PROGRESSION: CLINICAL_PROGRESSION: NOT CHANGED

## 2021-09-08 ASSESSMENT — PAIN DESCRIPTION - LOCATION
LOCATION: BACK
LOCATION: HIP;BACK

## 2021-09-08 ASSESSMENT — PAIN DESCRIPTION - PAIN TYPE
TYPE: CHRONIC PAIN
TYPE: CHRONIC PAIN

## 2021-09-08 ASSESSMENT — PAIN - FUNCTIONAL ASSESSMENT: PAIN_FUNCTIONAL_ASSESSMENT: ACTIVITIES ARE NOT PREVENTED

## 2021-09-08 ASSESSMENT — PAIN DESCRIPTION - ORIENTATION: ORIENTATION: LOWER

## 2021-09-08 ASSESSMENT — PAIN DESCRIPTION - DESCRIPTORS: DESCRIPTORS: ACHING;DISCOMFORT
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
## 2021-09-08 NOTE — PROGRESS NOTES
Daily Progress Note  Lander Libman, MD  9/8/2021  CHIEF COMPLAINT: Suicidal ideation with a plan    Reviewed patient's current plan of care and vital signs with nursing staff. Sleep:  6 hours last night  Attending groups: No: Largely remains isolated to her room    SUBJECTIVE:    Patient reports that her mood is somewhat better however continues to report feeling very helpless and hopeless. Reports that she sees no hope in her life and is having thoughts of wishing she was dead. Denies any active intent to kill herself and denies any plan. Able to contract for safety. Was tearful during the interview. States that she has no wish to go back to her place as she is very worried that people are breaking into her house and have been following her. Discussed several paranoid thoughts today stating that there are several vehicles that have been following her at work and are spying on her. She is working with [address] professional services liaison to go to a domestic violence shelter. Patient reports she feels very overwhelmed and dealing with severe anticipatory anxiety regarding the placement options. She is using Atarax as needed to help with this anxiety. Reports dealing with some constipation and is using as needed stool softeners. Reports that she is sleeping better and continues to fail tired most of the day withdrawing from cocaine.     Mental Status Exam  Level of consciousness:  Within normal limits  Appearance: Hospital attire, seated in chair, with good grooming and hygiene   Behavior/Motor: No abnormalities noted  Attitude toward examiner:  Cooperative, attentive, good eye contact  Speech:  spontaneous, normal rate, normal volume and well articulated  Mood: Anxious depressed  Affect: Flat withdrawn, tearful  Thought processes:  linear, goal directed and coherent  Thought content:  denies homicidal ideation  Suicidal Ideation: Passive suicidal ideation  Delusions: Paranoid delusions  Perceptual Disturbance:  denies any perceptual disturbance  Cognition:  Oriented to self, location, time, and situation  Memory: age appropriate  Insight & Judgement: improving  Medication side effects:  denies       Data   height is 5' 4\" (1.626 m) and weight is 290 lb (131.5 kg). Her oral temperature is 96.4 °F (35.8 °C). Her blood pressure is 136/81 and her pulse is 77. Her respiration is 16 and oxygen saturation is 99%.    Labs:   Admission on 09/05/2021   Component Date Value Ref Range Status    Glucose 09/05/2021 112* 70 - 108 mg/dL Final    CREATININE 09/05/2021 0.7  0.4 - 1.2 mg/dL Final    BUN 09/05/2021 11  7 - 22 mg/dL Final    Sodium 09/05/2021 143  135 - 145 meq/L Final    Potassium reflex Magnesium 09/05/2021 3.9  3.5 - 5.2 meq/L Final    Chloride 09/05/2021 108  98 - 111 meq/L Final    CO2 09/05/2021 24  23 - 33 meq/L Final    Calcium 09/05/2021 9.3  8.5 - 10.5 mg/dL Final    AST 09/05/2021 27  5 - 40 U/L Final    Alkaline Phosphatase 09/05/2021 76  38 - 126 U/L Final    Total Protein 09/05/2021 6.5  6.1 - 8.0 g/dL Final    Albumin 09/05/2021 4.3  3.5 - 5.1 g/dL Final    Total Bilirubin 09/05/2021 0.4  0.3 - 1.2 mg/dL Final    ALT 09/05/2021 51  11 - 66 U/L Final    Performed at [address] [place] [id number]    WBC 09/05/2021 8.2  4.8 - 10.8 thou/mm3 Final    RBC 09/05/2021 3.95* 4.20 - 5.40 mill/mm3 Final    Hemoglobin 09/05/2021 12.9  12.0 - 16.0 gm/dl Final    Hematocrit 09/05/2021 37.7  37.0 - 47.0 % Final    MCV 09/05/2021 95.4  81.0 - 99.0 fL Final    MCH 09/05/2021 32.7  26.0 - 33.0 pg Final    MCHC 09/05/2021 34.2  32.2 - 35.5 gm/dl Final    RDW-CV 09/05/2021 13.5  11.5 - 14.5 % Final    RDW-SD 09/05/2021 46.7* 35.0 - 45.0 fL Final    Platelets 89/27/1244 236  130 - 400 thou/mm3 Final    MPV 09/05/2021 9.6  9.4 - 12.4 fL Final    Seg Neutrophils 09/05/2021 61.2  % Final    Lymphocytes 09/05/2021 30.0  % Final    Monocytes 09/05/2021 7.1  % Final    Eosinophils 09/05/2021 0.7  % Final    Basophils 09/05/2021 0.5  % Final    Immature Granulocytes 09/05/2021 0.5  % Final    Segs Absolute 09/05/2021 5.0  1 - 7 thou/mm3 Final    Lymphocytes Absolute 09/05/2021 2.5  1.0 - 4.8 thou/mm3 Final    Monocytes Absolute 09/05/2021 0.6  0.4 - 1.3 thou/mm3 Final    Eosinophils Absolute 09/05/2021 0.1  0.0 - 0.4 thou/mm3 Final    Basophils Absolute 09/05/2021 0.0  0.0 - 0.1 thou/mm3 Final    Immature Grans (Abs) 09/05/2021 0.04  0.00 - 0.07 thou/mm3 Final    nRBC 09/05/2021 0  /100 wbc Final    Performed at [address] [place] [address], [address]    Troponin T 09/05/2021 < 0.010  ng/ml Final    Comment: <0.010 ng/ml        Normal  > or = 0.010 ng/ml  Elevated   (99%)                      Consistent with myocardial damage    Cardiac troponin values can be elevated by many disease states in addition  to acute ischemia. These include, but are not limited to: chronic renal  failure, CHF, CVA, pulmonary embolus, COPD, myocardial trauma/surgery,  myocarditis, pericarditis, tachycardia, aortic dissection, amyloidosis,  sepsis and strenuous exercise. Serial measurement of troponin is strongly  recommended as a first step in determining whether a low level elevation  represents an acute or chronic condition.   Performed at [address] [place] [address], [address]      Ventricular Rate 09/05/2021 82  BPM Preliminary    Atrial Rate 09/05/2021 82  BPM Preliminary    P-R Interval 09/05/2021 188  ms Preliminary    QRS Duration 09/05/2021 88  ms Preliminary    Q-T Interval 09/05/2021 424  ms Preliminary    QTc Calculation (Bazett) 09/05/2021 495  ms Preliminary    P Axis 09/05/2021 80  degrees Preliminary    R Axis 09/05/2021 36  degrees Preliminary    T Axis 09/05/2021 60  degrees Preliminary    Anion Gap 09/05/2021 11.0  8.0 - 16.0 meq/L Final    Comment: ANION GAP = Sodium -(Chloride + CO2)  Performed at [facility]

## 2021-09-08 NOTE — PLAN OF CARE
Problem: Altered Mood, Psychotic Behavior:  Goal: Able to verbalize decrease in frequency and intensity of hallucinations  Description: Able to verbalize decrease in frequency and intensity of hallucinations  Outcome: Ongoing  Note: Patient denies hallucinations so far this shift. Problem: Altered Mood, Psychotic Behavior:  Goal: Able to verbalize reality based thinking  Description: Able to verbalize reality based thinking  Outcome: Ongoing  Note: Patient alert and oriented x 3, not to situation. Patient reports feeling paranoid about \"the truck following me, there is a [employer] truck, [address] truck, white with tan strips truck, and a blue truck, they have been following me for about 6 months\". When ask if she has reported this to the police, patient states \"no because I am not sure if they are undercover government agents or under cover police\". Patient reports she is scared to go home because she called the police on a drug house. Patient reports \"I feel defeated, every time I get a few steps ahead, I get knocked back down. I am overwhelmed, I just want to someone to tell me what I am suppose to do, no one is helping me make a decision\". Encouraged patient to make a list of the places she needs to call and that she is only the person who can make decisions for herself. Patient spoke with her  Usha Velasquez on the phone today. Problem: Substance Abuse:  Goal: Participates in care planning  Description: Participates in care planning  Outcome: Ongoing  Note: Patient denies withdrawal symptoms at present time. Problem: Depressive Behavior With or Without Suicide Precautions:  Goal: Able to verbalize and/or display a decrease in depressive symptoms  Description: Able to verbalize and/or display a decrease in depressive symptoms  Outcome: Ongoing  Note: Patient reports mood 7/10 with 10 being normal. Has flat and sad affect. Speech clear. Good eye contact.  Reports some hope for future and identifies no one as their support system. Problem: Depressive Behavior With or Without Suicide Precautions:  Goal: Ability to disclose and discuss suicidal ideas will improve  Description: Ability to disclose and discuss suicidal ideas will improve  Outcome: Ongoing  Note: Patient denies suicidal ideations, no plan or intent to harm self. Patient remains on suicidal precautions 15 checks for safety. Instructed to seek staff as needed for thoughts of self harm. Problem: Depressive Behavior With or Without Suicide Precautions:  Goal: Able to verbalize support systems  Description: Able to verbalize support systems  Outcome: Ongoing  Note: Patient reports no one as their support system. Patient reports \"I can't ask my children for help and my parents are on social security. Encouraged patient to call her bill collectors to let them know that she was in the hospital and see if the companies would work with her regarding her bills. Patient verbalized understanding. Problem: Discharge Planning:  Goal: Discharged to appropriate level of care  Description: Discharged to appropriate level of care  Outcome: Ongoing  Note: Patient voices she needs a place in [facility] before discharge. Patient reports she is scared and terrified to return back to the home in [place]. Discharge planner working with patient to achieve optimal discharge plans, specific to individual needs. Problem: Anxiety:  Goal: Level of anxiety will decrease  Description: Level of anxiety will decrease  Outcome: Ongoing  Note: Patient reports anxiety. Pt taking Atarax prn. Verbalized medication was effective. Problem: Activity:  Goal: Sleeping patterns will improve  Description: Sleeping patterns will improve  Outcome: Ongoing  Note: Patient slept 6.5 hours last night, reports she slept ok. Encourage patient not to take naps during the day, relax several hours before bed and to take prescribed sleep meds as ordered.  Patient verbalized understanding. Problem: Gas Exchange - Impaired:  Goal: Levels of oxygenation will improve  Description: Levels of oxygenation will improve  Outcome: Ongoing  Note: Patient pox 99% on room air this am. Has not requested albuterol PRN treatment so far this shift. Problem: Pain:  Goal: Pain level will decrease  Description: Pain level will decrease  Outcome: Ongoing  Note: Pain Assessment: 0-10  Pain Level: 2   Patient's Stated Pain Goal: No pain   Is pain goal met at this time? Yes   Patient reports back and hip pain, taking Motrin as needed, reports decreased pain. Non-Pharmaceutical Pain Intervention(s): Rest    Care plan reviewed with patient. Patient verbalize understanding of the plan of care and contribute to goal setting.

## 2021-09-08 NOTE — BH NOTE
Group Therapy Note    Date: 9/7/2021  Start Time: 2000  End Time:  2020  Number of Participants: 1    Type of Group: Wrap-Up /Relaxation  Wellness Binder Information  Module Name:  None  Session Number:  None    Patient's Goal:  To call my job    Notes:  Met    Status After Intervention:  Unchanged    Participation Level: Interactive    Participation Quality: Appropriate      Speech:  pressured      Thought Process/Content: Delusional      Affective Functioning: Blunted      Mood: anxious and depressed      Level of consciousness:  Alert      Response to Learning: Able to retain information      Endings: None Reported    Modes of Intervention: Education      Discipline Responsible: Registered Nurse      Signature:   Kassie Fitch RN

## 2021-09-08 NOTE — BH NOTE
Group Therapy Note    Date: 9/8/2021  Start Time:  1000  End Time:   1100    Number of Participants: 7    Type of Group:  Recreational/Social    Patient's Goal:  Increase socialization. Notes:   Patient was social with others and was an active participant in group.      Status After Intervention:  Unchanged    Participation Level: Interactive    Participation Quality: Attentive      Speech:  hesitant      Thought Process/Content: Logical      Affective Functioning: Flat      Mood: depressed and irritable      Level of consciousness:  Alert      Response to Learning: Progressing to goal      Endings: None Reported    Modes of Intervention: Socialization and Activity      Discipline Responsible: Psychoeducational Specialist      Signature:  Julieta Gonzalez

## 2021-09-08 NOTE — BH NOTE
PLAN OF CARE:     Start Time: 0900  End Time:  0930    Group Topic:  Daily Goals    Group Type:   Goal Group    Intervention/Goal:  To increase support and identify daily goals    Attendance:  Attended group    Affect:    flat    Behavior:  cooperative     Response:  appropriate    Daily Goal:    To talk to my  from Fabi[facility] Casiano.     Progress:  Progressing to goal

## 2021-09-08 NOTE — GROUP NOTE
Group Therapy Note    Date: 9/8/2021    Group Start Time: 1330  Group End Time: 3951  Group Topic: Psychotherapy    [facility]    CHELLY Johnson    Group Therapy Note    Attendees: No Attendees  Announced over loud speaker. Encouraged attendance. Waited 10 minutes into group time. No one came.              HOLGER NavarroS

## 2021-09-08 NOTE — PLAN OF CARE
Problem: Altered Mood, Psychotic Behavior:  Goal: Able to verbalize decrease in frequency and intensity of hallucinations  Description: Able to verbalize decrease in frequency and intensity of hallucinations  9/7/2021 2307 by Latosha Soto RN  Outcome: Ongoing  Note: Patient denies any hallucinations at present. 9/7/2021 1057 by Silvina Michel RN  Outcome: Ongoing  Note: Patient denies hallucinations this am.  Goal: Able to verbalize reality based thinking  Description: Able to verbalize reality based thinking  9/7/2021 2307 by Latosha Soto RN  Outcome: Ongoing  Note: Patient states she feels paranoid that the same cars follow her all the time and that she fears to return to her apartment because she called the police on a drug house. 9/7/2021 1057 by Silvina Michel RN  Outcome: Ongoing  Note: Patient alert and oriented x 4. Problem: Substance Abuse:  Goal: Participates in care planning  Description: Participates in care planning  9/7/2021 2307 by Latosha Soto RN  Outcome: Ongoing  Note: Care plan reviewed with patient and fair understanding verbalized. 9/7/2021 1057 by Silvina Michel RN  Outcome: Ongoing  Note: Patient denies withdrawal symptoms so far this shift. Problem: Depressive Behavior With or Without Suicide Precautions:  Goal: Able to verbalize and/or display a decrease in depressive symptoms  Description: Able to verbalize and/or display a decrease in depressive symptoms  9/7/2021 2307 by Latosha Soto RN  Outcome: Ongoing  Note: Patient states she continues with moderate depression this shift. Patient noted with a blunt affect and brightens slightly with interaction. 9/7/2021 1057 by Silvina Michel RN  Outcome: Ongoing  Note: Patient reports mood 7/10 with 10 being normal. Has flat and blunt affect. Speech clear. Good eye contact. Reports \"hope so\" regarding hope for future and identifies no one as her support system.       Goal: Ability to disclose and discuss suicidal ideas will improve  Description: Ability to disclose and discuss suicidal ideas will improve  9/7/2021 2307 by Ellie Cameron RN  Outcome: Ongoing  Note: Patient denies any suicidal thoughts at present. 9/7/2021 1057 by Vandana Virgen RN  Outcome: Ongoing  Note: Patient denies suicidal ideations, no plan or intent to harm self. Patient remains on suicidal precautions 15 checks for safety. Instructed to seek staff as needed for thoughts of self harm. Goal: Able to verbalize support systems  Description: Able to verbalize support systems  9/7/2021 2307 by Ellie Cameron RN  Outcome: Ongoing  Note: Patient states she has no support system. 9/7/2021 1057 by Vandana Virgen RN  Outcome: Ongoing  Note: Patient reports no one as their support system. Problem: Discharge Planning:  Goal: Discharged to appropriate level of care  Description: Discharged to appropriate level of care  9/7/2021 2307 by Ellie Cameron RN  Outcome: Ongoing  Note: Patient states she is unsure of her discharge plan. 9/7/2021 1057 by Vandana Virgen RN  Outcome: Ongoing  Note: Patient voices she needs a place to go before discharge. Patient states \"I am scared to go back to [place], I am terrified\". Discharge planner working with patient to achieve optimal discharge plans, specific to individual needs. Problem: Anxiety:  Goal: Level of anxiety will decrease  Description: Level of anxiety will decrease  9/7/2021 2307 by Ellie Cameron RN  Outcome: Ongoing  Note: Patient states she continues to feel moderately anxious at times. 9/7/2021 1057 by Vandana Virgen RN  Outcome: Ongoing  Note: Patient denies anxiety so far this shift. Problem: Activity:  Goal: Sleeping patterns will improve  Description: Sleeping patterns will improve  9/7/2021 2307 by Ellie Cameron RN  Outcome: Ongoing  Note: Patient states she feels her sleep pattern is starting to improve.   9/7/2021 1057 by Vandana Virgen RN  Outcome: Ongoing  Note: Patient slept 7.5 hours last night, reports she did not slept \"I was up and down\". Encourage patient not to take naps during the day, relax several hours before bed and to take prescribed sleep meds as ordered. Patient verbalized understanding. Problem: Gas Exchange - Impaired:  Goal: Levels of oxygenation will improve  Description: Levels of oxygenation will improve  9/7/2021 2307 by Ken Poe RN  Outcome: Ongoing  Note: Patient noted with audible wheezes at times. 9/7/2021 1057 by Lia Ochoa RN  Outcome: Ongoing  Note: Patient requested PRN albuterol inhaler, pulse ox 98% on room air. Problem: Pain:  Goal: Pain level will decrease  Description: Pain level will decrease  9/7/2021 2307 by Ken Poe RN  Outcome: Ongoing  Note: Patient states a current pain of a 5 in her lower back and radiating to bilateral hips. 9/7/2021 1057 by Lia Ochoa RN  Outcome: Ongoing  Note: Pain Assessment: 0-10  Pain Level: 5   Patient's Stated Pain Goal: No pain   Is pain goal met at this time? Yes   Patient reports chronic lower back pain, taking motrin as needed, reports decrease pain. Non-Pharmaceutical Pain Intervention(s): Rest, Repositioned    Goal: Control of acute pain  Description: Control of acute pain  9/7/2021 1057 by Lia Ochoa RN  Outcome: Completed  Goal: Control of chronic pain  Description: Control of chronic pain  9/7/2021 1057 by Lia Ochoa RN  Outcome: Completed     Problem: Coping:  Goal: Ability to identify problematic behaviors that deter socialization will improve  Description: Ability to identify problematic behaviors that deter socialization will improve  9/7/2021 2307 by Ken Poe RN  Outcome: Ongoing  Note: Ongoing.   9/7/2021 1436 by Wanda Quintana  Outcome: Met This Shift     Problem: Role Relationship:  Goal: Ability to verbalize awareness of feelings that lead to decreased social interaction will improve  Description: Ability to verbalize awareness of feelings that lead to decreased social interaction will improve  9/7/2021 2307 by Kassie Fitch RN  Outcome: Ongoing  Note: Ongoing. 9/7/2021 1436 by Irina Davis  Outcome: Met This Shift   Care plan reviewed with patient.   Patient does verbalize understanding of the plan of care and does contribute to goal setting

## 2021-09-09 PROCEDURE — 90833 PSYTX W PT W E/M 30 MIN: CPT | Performed by: PSYCHIATRY & NEUROLOGY

## 2021-09-09 PROCEDURE — 6370000000 HC RX 637 (ALT 250 FOR IP): Performed by: PHYSICIAN ASSISTANT

## 2021-09-09 PROCEDURE — 1240000000 HC EMOTIONAL WELLNESS R&B

## 2021-09-09 PROCEDURE — 99231 SBSQ HOSP IP/OBS SF/LOW 25: CPT | Performed by: PSYCHIATRY & NEUROLOGY

## 2021-09-09 PROCEDURE — 6370000000 HC RX 637 (ALT 250 FOR IP): Performed by: PSYCHIATRY & NEUROLOGY

## 2021-09-09 PROCEDURE — APPSS30 APP SPLIT SHARED TIME 16-30 MINUTES: Performed by: PHYSICIAN ASSISTANT

## 2021-09-09 RX ORDER — DULOXETIN HYDROCHLORIDE 30 MG/1
30 CAPSULE, DELAYED RELEASE ORAL DAILY
Status: DISCONTINUED | OUTPATIENT
Start: 2021-09-10 | End: 2021-09-10

## 2021-09-09 RX ORDER — DULOXETIN HYDROCHLORIDE 20 MG/1
20 CAPSULE, DELAYED RELEASE ORAL DAILY
Qty: 30 CAPSULE | Refills: 0 | Status: CANCELLED | OUTPATIENT
Start: 2021-09-10

## 2021-09-09 RX ADMIN — BUSPIRONE HYDROCHLORIDE 30 MG: 10 TABLET ORAL at 20:30

## 2021-09-09 RX ADMIN — HYDROXYZINE HYDROCHLORIDE 50 MG: 25 TABLET ORAL at 20:01

## 2021-09-09 RX ADMIN — HYDROXYZINE HYDROCHLORIDE 50 MG: 25 TABLET ORAL at 07:58

## 2021-09-09 RX ADMIN — IBUPROFEN 400 MG: 400 TABLET, FILM COATED ORAL at 07:59

## 2021-09-09 RX ADMIN — IBUPROFEN 400 MG: 400 TABLET, FILM COATED ORAL at 20:01

## 2021-09-09 RX ADMIN — CARBAMAZEPINE 400 MG: 200 TABLET, EXTENDED RELEASE ORAL at 07:59

## 2021-09-09 RX ADMIN — CARBAMAZEPINE 400 MG: 200 TABLET, EXTENDED RELEASE ORAL at 20:31

## 2021-09-09 RX ADMIN — DULOXETINE HYDROCHLORIDE 20 MG: 20 CAPSULE, DELAYED RELEASE ORAL at 07:59

## 2021-09-09 RX ADMIN — ATORVASTATIN CALCIUM 40 MG: 40 TABLET, FILM COATED ORAL at 20:30

## 2021-09-09 RX ADMIN — ASPIRIN 81 MG: 81 TABLET, CHEWABLE ORAL at 07:58

## 2021-09-09 RX ADMIN — BUSPIRONE HYDROCHLORIDE 30 MG: 10 TABLET ORAL at 07:59

## 2021-09-09 ASSESSMENT — PAIN DESCRIPTION - PAIN TYPE: TYPE: CHRONIC PAIN

## 2021-09-09 ASSESSMENT — PAIN DESCRIPTION - ONSET: ONSET: ON-GOING

## 2021-09-09 ASSESSMENT — PAIN DESCRIPTION - PROGRESSION
CLINICAL_PROGRESSION: NOT CHANGED
CLINICAL_PROGRESSION: GRADUALLY IMPROVING

## 2021-09-09 ASSESSMENT — PAIN SCALES - GENERAL
PAINLEVEL_OUTOF10: 6
PAINLEVEL_OUTOF10: 0
PAINLEVEL_OUTOF10: 7

## 2021-09-09 ASSESSMENT — PAIN DESCRIPTION - DESCRIPTORS: DESCRIPTORS: ACHING

## 2021-09-09 ASSESSMENT — PAIN DESCRIPTION - LOCATION: LOCATION: HIP

## 2021-09-09 ASSESSMENT — PAIN DESCRIPTION - FREQUENCY: FREQUENCY: INTERMITTENT

## 2021-09-09 ASSESSMENT — PAIN DESCRIPTION - ORIENTATION: ORIENTATION: RIGHT;LEFT

## 2021-09-09 ASSESSMENT — PAIN - FUNCTIONAL ASSESSMENT: PAIN_FUNCTIONAL_ASSESSMENT: ACTIVITIES ARE NOT PREVENTED

## 2021-09-09 NOTE — BH NOTE
PLAN OF CARE:     Start Time:  0900  End Time:   0930    Group Topic:  Daily Goals    Group Type:   Goal Group    Intervention/Goal:  To increase support and identify daily goals    Attendance:    Participated in group    Affect:    flat    Behavior:  Cooperative     Response:    Identified a daily goal     Daily Goal:    \" Find a place to go today. \"    Progress:    Progressing to goal

## 2021-09-09 NOTE — PROGRESS NOTES
This RN has reviewed and agrees with student nurse Brent cain and has collaborated with this student's documentation.

## 2021-09-09 NOTE — GROUP NOTE
Group Therapy Note    Date: 9/9/2021    Group Start Time: 4623  Group End Time: 3180  Group Topic: Psychotherapy    [facility]    SUKH Betancourt        Group Therapy Note    Attendees: 4         Patient's Goal:  Discuss coping skills and thoughts about patient illness with peers. Notes: Patient was able to identify coping skills to utilize throughout regular daily routines. Patient supported peers and discussed sometimes feeling like an outcast and how to address and work through those feelings. Status After Intervention:  Improved    Participation Level:  Active Listener and Interactive    Participation Quality: Appropriate, Attentive, Sharing and Supportive      Speech:  normal      Thought Process/Content: Logical      Affective Functioning: Congruent      Mood: euthymic      Level of consciousness:  Alert, Oriented x4 and Attentive      Response to Learning: Able to verbalize current knowledge/experience, Able to verbalize/acknowledge new learning, Able to retain information, Capable of insight, Able to change behavior and Progressing to goal      Endings: None Reported    Modes of Intervention: Support, Socialization and Problem-solving      Discipline Responsible: /Counselor      Signature:  SUKH Betancourt

## 2021-09-09 NOTE — GROUP NOTE
Group Therapy Note    Date: 9/9/2021    Group Start Time: 1100  Group End Time: 36  Group Topic: Healthy Living/Wellness    [facility]    Malgorzata Arce LPN        Group Therapy Note    Attendees: 4             Notes:  Did not attend    Discipline Responsible: Licensed Practical Nurse/nursing students      Signature:  Malgorzata Arce LPN

## 2021-09-09 NOTE — PROGRESS NOTES
Department of Psychiatry  Progress Note     Chief Complaint:  Bipolar disorder, curr episode depressed, severe, w/psychotic features ([address]     SUBJECTIVE:    PROGRESS:  Aisha Bae is seen seated out on the unit. She is cooperative and agrees to speak with this writer in the interview room. Aisha Bae states she is doing okay today. She is scared to go back to her apartment due to feeling the people at the drug house are after her after she called the  to their home. Aisha Bae says \"people get killed for what I did. \" She reports she [facility] today and they do not have any beds. She tried calling [facility] but they cannot accept her due to not having any beds and her living out of the [facility]. She called her mother during the interview and her mother was not willing to have Aisha Bae stay with her due to an incident where Aisha Bae broke down a door at her and her ex's apartment a year ago to get her belongings. Her ex and her mother have the same landlord so her mother told her she was putting her in a \"predicament. \"  Aisha Bae reports she is feeling very depressed today and says \"it hurts knowing your family isn't on board. \" She says her family has not called her or visited her since she has been admitted. She reports she has been having thoughts of not wanting to live anymore because \"it is hard to deal with all of it. \" She denies any plan or intent at this time and is able to contract for safety on the unit. She continues to feel hopeless and helpless about her living situation and lack of support from her family. She reports she has been having a lot of anxiety about the what if's. She is worried about her housing situation, her job, her car. She has been utilizing Atarax PRN and feels it is helpful. She reports she tossed and turned last night due to hip and back pain. She has been utilizing PRN medication for her pain. Staff reported she slept 8.5 hours continuous last night. She has been eating well on the unit. She has been out on the unit interacting with peers and intermittently attending groups. She has been compliant with her medications and denies any side effects. Suicidal ideations: passive without plan or intent   Compliance with medications: good   Medication side effects: absent  ROS: Patient has new complaints:  no  Sleep quality: 8.5 hours continuous last night   Attending groups: yes      OBJECTIVE      Medications  Current Facility-Administered Medications: DULoxetine (CYMBALTA) extended release capsule 20 mg, 20 mg, Oral, Daily  acetaminophen (TYLENOL) tablet 650 mg, 650 mg, Oral, Q4H PRN  ibuprofen (ADVIL;MOTRIN) tablet 400 mg, 400 mg, Oral, Q6H PRN  magnesium hydroxide (MILK OF MAGNESIA) 400 MG/5ML suspension 30 mL, 30 mL, Oral, Daily PRN  aluminum & magnesium hydroxide-simethicone (MAALOX) 200-200-20 MG/5ML suspension 30 mL, 30 mL, Oral, Q6H PRN  hydrOXYzine (ATARAX) tablet 50 mg, 50 mg, Oral, TID PRN  traZODone (DESYREL) tablet 50 mg, 50 mg, Oral, Nightly PRN  nicotine (NICODERM CQ) 14 MG/24HR 1 patch, 1 patch, TransDERmal, Daily  carBAMazepine (TEGRETOL XR) extended release tablet 400 mg, 400 mg, Oral, BID  busPIRone (BUSPAR) tablet 30 mg, 30 mg, Oral, BID  atorvastatin (LIPITOR) tablet 40 mg, 40 mg, Oral, Nightly  aspirin chewable tablet 81 mg, 81 mg, Oral, Daily  albuterol sulfate  (90 Base) MCG/ACT inhaler 2 puff, 2 puff, Inhalation, Q6H PRN     Physical     height is 5' 4\" (1.626 m) and weight is 290 lb (131.5 kg). Her tympanic temperature is 99.2 °F (37.3 °C). Her blood pressure is 134/97 (abnormal) and her pulse is 88. Her respiration is 16 and oxygen saturation is 98%.    Lab Results   Component Value Date    WBC 8.2 09/05/2021    HGB 12.9 09/05/2021    HCT 37.7 09/05/2021     09/05/2021    CHOL 142 04/06/2021    TRIG 145 04/06/2021    HDL 47 04/06/2021    LDLDIRECT 117 (H) 09/02/2020    ALT 51 09/05/2021    AST 27 09/05/2021     09/05/2021    K 3.9 09/05/2021     09/05/2021    CREATININE 0.7 09/05/2021    BUN 11 09/05/2021    CO2 24 09/05/2021    TSH 3.160 04/05/2021    INR 0.9 12/07/2020    LABA1C 5.6 04/06/2021          Mental Status Exam:   Level of consciousness:  Within normal limits  Appearance: Hospital attire, seated in chair, with good grooming and hygiene   Behavior/Motor: No abnormalities noted  Attitude toward examiner:  Cooperative, attentive, good eye contact  Speech:  spontaneous, normal rate, normal volume and well articulated  Mood: Anxious, depressed  Affect: blunted  Thought processes:  linear, goal directed and coherent  Thought content:  denies homicidal ideation  Suicidal Ideation: Passive suicidal ideation  Delusions: Paranoid delusions  Perceptual Disturbance:  denies any perceptual disturbance  Cognition:  Oriented to self, location, time, and situation  Memory: age appropriate  Insight & Judgement: improving      ASSESSMENT     Bipolar disorder, curr episode depressed, severe, w/psychotic features [address] [address]   PTSD  ANGEL LUIS  Cocaine use disorder, severe  Rule out substance induced mood disorder/psychosis    PLAN    Patient's symptoms show no significant improvement today   Will continue to titrate her antidepressant medication  SW to assist with housing placement. Will consider stepping patient down to CSU if patient is not able to go to domestic violence shelter  Attempt to develop insight, psycho-education and supportive therapy conducted. Probable discharge: TBD   Follow-up: [facility] outpatient, daily while on inpatient unit     Electronically signed by Nicanor Leyva PA-C on 9/9/2021 at 6:16 AM Reviewed patient's current plan of care and vital signs with nursing staff. Psychiatry Attending Attestation     I independently saw and evaluated the patient. I reviewed the Advance Practice Provider's documentation above.   Any additional comments or changes to the Advance Practice Provider's documentation are stated below otherwise agree with assessment. Patient feels better than before. Mood and affect are better. Patient reports fleeting suicidal thoughts with no intent or plan. Patient notes that these thoughts are occurring less frequently. Denies any homicidal thoughts, that was explored with the patient. Oriented to time place and person. Recent and remote memory is intact. Patient feels hopeful. Sleep and appetite is good. No side effect from medication reported. Side-effect of medication were discussed with the patient . Patient is responding to current treatment. Discharge soon, if patient continues to show improvement. Case discussed with the staff. More than 16 minutes of the session was spent doing supportive psychotherapy. Session started at around 9:30 AM today ended at around 10 AM today.     Electronically signed by Brian Chow MD on 9/9/21 at 3:29 PM EDT

## 2021-09-09 NOTE — PLAN OF CARE
Problem: Altered Mood, Psychotic Behavior:  Goal: Able to verbalize decrease in frequency and intensity of hallucinations  Description: Able to verbalize decrease in frequency and intensity of hallucinations  9/8/2021 2246 by Meredith Anaya RN  Outcome: Met This Shift  Note: Denies hallucinations this shift. 9/8/2021 1604 by Vannesa Gomez RN  Outcome: Ongoing  Note: Patient denies hallucinations so far this shift. Goal: Able to verbalize reality based thinking  Description: Able to verbalize reality based thinking  9/8/2021 2246 by Meredith Anaya RN  Outcome: Ongoing  Note: Alert and oriented x 3 not oriented to situation. 9/8/2021 1604 by Vannesa Gomez RN  Outcome: Ongoing  Note: Patient alert and oriented x 3, not to situation. Patient reports feeling paranoid about \"the truck following me, there is a [employer] truck, [address] truck, white with tan strips truck, and a blue truck, they have been following me for about 6 months\". When ask if she has reported this to the police, patient states \"no because I am not sure if they are undercover government agents or under cover police\". Patient reports she is scared to go home because she called the police on a drug house. Patient reports \"I feel defeated, every time I get a few steps ahead, I get knocked back down. I am overwhelmed, I just want to someone to tell me what I am suppose to do, no one is helping me make a decision\". Encouraged patient to make a list of the places she needs to call and that she is only the person who can make decisions for herself. Patient spoke with her  Kameron Mg on the phone today. Problem: Substance Abuse:  Goal: Participates in care planning  Description: Participates in care planning  9/8/2021 2246 by Meredith Anaya RN  Outcome: Met This Shift  Note: Care plan reviewed with patient and verbalize understanding of the plan of care and contribute to goal setting.      9/8/2021 1604 by Vannesa Gomez RN  Outcome: Ongoing  Note: Patient denies withdrawal symptoms at present time. Problem: Depressive Behavior With or Without Suicide Precautions:  Goal: Able to verbalize and/or display a decrease in depressive symptoms  Description: Able to verbalize and/or display a decrease in depressive symptoms  9/8/2021 2246 by Harris Pack RN  Outcome: Not Met This Shift  Note: Complained of depression. Rates her mood as a 7 with 10 being the best which is incongruent. Affect flat. Good eye contact. States \" I am trying to be hopeful for the future\". Good peer interaction. 9/8/2021 1604 by Chidi Barriga RN  Outcome: Ongoing  Note: Patient reports mood 7/10 with 10 being normal. Has flat and sad affect. Speech clear. Good eye contact. Reports some hope for future and identifies no one as their support system. Goal: Ability to disclose and discuss suicidal ideas will improve  Description: Ability to disclose and discuss suicidal ideas will improve  9/8/2021 2246 by Harris Pack RN  Outcome: Met This Shift  Note: Denies suicidal ideations this shift. 9/8/2021 1604 by Chidi Barriga RN  Outcome: Ongoing  Note: Patient denies suicidal ideations, no plan or intent to harm self. Patient remains on suicidal precautions 15 checks for safety. Instructed to seek staff as needed for thoughts of self harm. Goal: Able to verbalize support systems  Description: Able to verbalize support systems  9/8/2021 2246 by Harris Pack RN  Outcome: Met This Shift  Note: Denies suicidal ideations this shift. 9/8/2021 1604 by Chidi Barriga RN  Outcome: Ongoing  Note: Patient reports no one as their support system. Patient reports \"I can't ask my children for help and my parents are on social security. Encouraged patient to call her bill collectors to let them know that she was in the hospital and see if the companies would work with her regarding her bills. Patient verbalized understanding.      Problem: Discharge Planning:  Goal: Discharged to appropriate level of care  Description: Discharged to appropriate level of care  9/8/2021 2246 by Mariam Davies RN  Outcome: Ongoing  Note: Discharge planning in progress. Plans on going to [facility] at discharge and follow up at [facility]. 9/8/2021 1604 by Hazel Pena RN  Outcome: Ongoing  Note: Patient voices she needs a place in [facility] before discharge. Patient reports she is scared and terrified to return back to the home in [place]. Discharge planner working with patient to achieve optimal discharge plans, specific to individual needs. Problem: Anxiety:  Goal: Level of anxiety will decrease  Description: Level of anxiety will decrease  9/8/2021 2246 by Mariam Davies RN  Outcome: Not Met This Shift  Note: Continues to be anxious this shift states due to what is happening at home. Requested atarax this shift. 9/8/2021 1604 by Hazel Pena RN  Outcome: Ongoing  Note: Patient reports anxiety. Pt taking Atarax prn. Verbalized medication was effective. Problem: Activity:  Goal: Sleeping patterns will improve  Description: Sleeping patterns will improve  9/8/2021 2246 by Mariam Davies RN  Outcome: Ongoing  Note: Slept 6.5 hours last evening denied need for prn sleep medication. 9/8/2021 1604 by Hazel Pena RN  Outcome: Ongoing  Note: Patient slept 6.5 hours last night, reports she slept ok. Encourage patient not to take naps during the day, relax several hours before bed and to take prescribed sleep meds as ordered. Patient verbalized understanding. Problem: Gas Exchange - Impaired:  Goal: Levels of oxygenation will improve  Description: Levels of oxygenation will improve  9/8/2021 2246 by Mariam Davies RN  Outcome: Met This Shift  Note: Pulse ox 98% no complaints of shortness of breath.    9/8/2021 1604 by Hazel Pena RN  Outcome: Ongoing  Note: Patient pox 99% on room air this am. Has not requested albuterol PRN treatment so far this shift. Problem: Pain:  Goal: Pain level will decrease  Description: Pain level will decrease  9/8/2021 2246 by Lexi Guthrie RN  Outcome: Met This Shift  Note: Denies pain this shift. 9/8/2021 1604 by Marino Flor RN  Outcome: Ongoing  Note: Pain Assessment: 0-10  Pain Level: 2   Patient's Stated Pain Goal: No pain   Is pain goal met at this time? Yes   Patient reports back and hip pain, taking Motrin as needed, reports decreased pain.   Non-Pharmaceutical Pain Intervention(s): Rest       Problem: Coping:  Goal: Ability to identify problematic behaviors that deter socialization will improve  Description: Ability to identify problematic behaviors that deter socialization will improve  9/8/2021 2246 by Lexi Guthrie RN  Outcome: Ongoing  9/8/2021 1457 by Estrella Jaruegui  Outcome: Met This Shift     Problem: Role Relationship:  Goal: Ability to verbalize awareness of feelings that lead to decreased social interaction will improve  Description: Ability to verbalize awareness of feelings that lead to decreased social interaction will improve  9/8/2021 2246 by Lexi Guthrie RN  Outcome: Ongoing  9/8/2021 1457 by Estrella Jauregui  Outcome: Met This Shift

## 2021-09-09 NOTE — PROGRESS NOTES
Group Therapy Note    Date: 9/8/2021  Start Time: 2000  End Time:  2020  Number of Participants:     Type of Group: Wrap-Up    Wellness Binder Information  Module Name:    Session Number:      Patient's Goal:  To make a plan     Notes:  Goal met     Status After Intervention:  Unchanged    Participation Level:  Active Listener and Interactive    Participation Quality: Appropriate      Speech:  normal      Thought Process/Content: Logical      Affective Functioning: Flat      Mood: anxious and depressed      Level of consciousness:  Alert      Response to Learning: Able to verbalize current knowledge/experience, Able to verbalize/acknowledge new learning and Able to retain information      Endings: None Reported    Modes of Intervention: Support and Socialization      Discipline Responsible: Registered Nurse      Signature:  Natalie Arriaga RN

## 2021-09-09 NOTE — PROGRESS NOTES
1329: Phone call to [facility], Kimberly Montez. Kimberly Montez will contact patient , Baudilio Concepcion. Kimberly Montez recommends suggesting that the patient discharge to her mother's home and continue reaching out to [facility] as she could still go to [facility] even from her mother's home. If patient were to go to [facility] or [facility] she would have to finance it. 0915: Phone call from [facility]. Patient is unable to discharge to crisis center as they do not have bed availability at this time. Provided patient with phone number to [facility] - patient attempted to call and became upset as the person on the phone asked her to \"speak plain\". Patient hung up and refuses to continue reaching out to [facility]. 1015: Phone call to [facility], Kimberly Montez. Updated Kimberly Montez on current discharge planning. Kimberly Montez will reach out to the patient's  and other [facility] staff to discuss possibility of patient stepping down to CSU.

## 2021-09-09 NOTE — PROGRESS NOTES
Darshan Cao is a 52 yo with a history of bipolar disorder and substance use. She presented to the ED for paranoia and suicidal ideation. She currently has passive thoughts of suicide and reports feeling very depressed. She has spent much of the morning in bed and states her only desire is to sleep and eat meals. She reports she has been having no problems sleeping at night. She reports having \"no one who cares about her. \" Her biggest worry is about not feeling safe going back to her house due to her calling the  on a drug house. She is waiting for a room to open up at the [facility] violence shelter and is frustrated that there was a bed available yesterday but today when she was ready to leave there is no longer one. She stated once she gets off the floor she desires to go to a art place where she can decompress by making collages.

## 2021-09-09 NOTE — PLAN OF CARE
Patient participated in one group so far and also visited with her daughter this afternoon so she is working toward her socialization goal for today. Patient will be encouraged to attend all groups on the unit daily and to come out of her room to socialize with others more often during the rest of her hospital stay.

## 2021-09-09 NOTE — PLAN OF CARE
Problem: Altered Mood, Psychotic Behavior:  Goal: Able to verbalize decrease in frequency and intensity of hallucinations  Description: Able to verbalize decrease in frequency and intensity of hallucinations  Outcome: Ongoing  Note: Denies hallucinations when questioned  Goal: Able to verbalize reality based thinking  Description: Able to verbalize reality based thinking  Outcome: Ongoing  Note: Oriented x 3     Problem: Substance Abuse:  Goal: Participates in care planning  Description: Participates in care planning  Outcome: Ongoing  Note: Discussed tx plan with patient  Attends groups  Compliant with medications      Problem: Depressive Behavior With or Without Suicide Precautions:  Goal: Able to verbalize and/or display a decrease in depressive symptoms  Description: Able to verbalize and/or display a decrease in depressive symptoms  Outcome: Ongoing  Note: Describes mood a 6/10, verbalizes feeling anxious and depressed  Goal: Ability to disclose and discuss suicidal ideas will improve  Description: Ability to disclose and discuss suicidal ideas will improve  Outcome: Ongoing  Note: Denies suicide plan  Goal: Able to verbalize support systems  Description: Able to verbalize support systems  Outcome: Ongoing  Note: Denies any support syste     Problem: Discharge Planning:  Goal: Discharged to appropriate level of care  Description: Discharged to appropriate level of care  Outcome: Ongoing  Note: Patient voices needs before discharge. Discharge planner working with patient to achieve optimal discharge plans, specific to individual needs. Problem: Anxiety:  Goal: Level of anxiety will decrease  Description: Level of anxiety will decrease  Outcome: Ongoing  Note: Patient is anxious as evidenced by patient's actions. Staff attempted to identify and relieve distress by talking with patient, refocusing on new activity, and provided clear limits. Offered self as a means of support and reassurance.  Patient accepted medication without incident. Will continue to monitor patient's behaviors to determine effectiveness of interventions. Problem: Anxiety:  Goal: Level of anxiety will decrease  Description: Level of anxiety will decrease  Outcome: Ongoing  Note: Patient is anxious as evidenced by patient's actions. Staff attempted to identify and relieve distress by talking with patient, refocusing on new activity, and provided clear limits. Offered self as a means of support and reassurance. Patient accepted medication without incident. Will continue to monitor patient's behaviors to determine effectiveness of interventions. Problem: Activity:  Goal: Sleeping patterns will improve  Description: Sleeping patterns will improve  Outcome: Ongoing  Note: Slept 8.5 hours     Problem: Gas Exchange - Impaired:  Goal: Levels of oxygenation will improve  Description: Levels of oxygenation will improve  Outcome: Ongoing     Problem: Pain:  Goal: Pain level will decrease  Description: Pain level will decrease  Outcome: Ongoing  Note: Motrin given for back and hip discomfort, no further complaints voiced     Problem: Coping:  Goal: Ability to identify problematic behaviors that deter socialization will improve  Description: Ability to identify problematic behaviors that deter socialization will improve  9/9/2021 1617 by Rose Reza RN  Outcome: Ongoing  9/9/2021 1242 by Mao Avalos  Outcome: Ongoing     Problem: Role Relationship:  Goal: Ability to verbalize awareness of feelings that lead to decreased social interaction will improve  Description: Ability to verbalize awareness of feelings that lead to decreased social interaction will improve  9/9/2021 1617 by Rose Reaz RN  Outcome: Ongoing  9/9/2021 1242 by Mao Avalos  Outcome: Ongoing   Care plan reviewed with patient . Patient  verbalize understanding of the plan of care and contribute to goal setting.

## 2021-09-10 PROCEDURE — APPSS30 APP SPLIT SHARED TIME 16-30 MINUTES: Performed by: PHYSICIAN ASSISTANT

## 2021-09-10 PROCEDURE — 6370000000 HC RX 637 (ALT 250 FOR IP): Performed by: PHYSICIAN ASSISTANT

## 2021-09-10 PROCEDURE — 90833 PSYTX W PT W E/M 30 MIN: CPT | Performed by: PSYCHIATRY & NEUROLOGY

## 2021-09-10 PROCEDURE — 1240000000 HC EMOTIONAL WELLNESS R&B

## 2021-09-10 PROCEDURE — 99232 SBSQ HOSP IP/OBS MODERATE 35: CPT | Performed by: PSYCHIATRY & NEUROLOGY

## 2021-09-10 PROCEDURE — 6370000000 HC RX 637 (ALT 250 FOR IP): Performed by: PSYCHIATRY & NEUROLOGY

## 2021-09-10 RX ORDER — TRAZODONE HYDROCHLORIDE 50 MG/1
50 TABLET ORAL NIGHTLY PRN
Qty: 30 TABLET | Refills: 0 | Status: CANCELLED | OUTPATIENT
Start: 2021-09-10

## 2021-09-10 RX ORDER — HYDROXYZINE 50 MG/1
50 TABLET, FILM COATED ORAL 3 TIMES DAILY PRN
Qty: 30 TABLET | Refills: 0 | Status: CANCELLED | OUTPATIENT
Start: 2021-09-10 | End: 2021-09-20

## 2021-09-10 RX ORDER — DULOXETIN HYDROCHLORIDE 20 MG/1
40 CAPSULE, DELAYED RELEASE ORAL DAILY
Status: DISCONTINUED | OUTPATIENT
Start: 2021-09-11 | End: 2021-09-11 | Stop reason: HOSPADM

## 2021-09-10 RX ORDER — DULOXETIN HYDROCHLORIDE 30 MG/1
30 CAPSULE, DELAYED RELEASE ORAL DAILY
Qty: 30 CAPSULE | Refills: 0 | Status: CANCELLED | OUTPATIENT
Start: 2021-09-10

## 2021-09-10 RX ADMIN — DULOXETINE HYDROCHLORIDE 30 MG: 30 CAPSULE, DELAYED RELEASE ORAL at 08:46

## 2021-09-10 RX ADMIN — BUSPIRONE HYDROCHLORIDE 30 MG: 10 TABLET ORAL at 21:00

## 2021-09-10 RX ADMIN — HYDROXYZINE HYDROCHLORIDE 50 MG: 25 TABLET ORAL at 20:59

## 2021-09-10 RX ADMIN — ATORVASTATIN CALCIUM 40 MG: 40 TABLET, FILM COATED ORAL at 21:00

## 2021-09-10 RX ADMIN — ASPIRIN 81 MG: 81 TABLET, CHEWABLE ORAL at 08:46

## 2021-09-10 RX ADMIN — HYDROXYZINE HYDROCHLORIDE 50 MG: 25 TABLET ORAL at 17:43

## 2021-09-10 RX ADMIN — CARBAMAZEPINE 400 MG: 200 TABLET, EXTENDED RELEASE ORAL at 21:01

## 2021-09-10 RX ADMIN — BUSPIRONE HYDROCHLORIDE 30 MG: 10 TABLET ORAL at 08:46

## 2021-09-10 RX ADMIN — CARBAMAZEPINE 400 MG: 200 TABLET, EXTENDED RELEASE ORAL at 08:46

## 2021-09-10 RX ADMIN — IBUPROFEN 400 MG: 400 TABLET, FILM COATED ORAL at 21:00

## 2021-09-10 ASSESSMENT — PAIN DESCRIPTION - FREQUENCY
FREQUENCY: CONTINUOUS
FREQUENCY: CONTINUOUS

## 2021-09-10 ASSESSMENT — PAIN DESCRIPTION - ORIENTATION
ORIENTATION: RIGHT;LEFT
ORIENTATION: OTHER (COMMENT)

## 2021-09-10 ASSESSMENT — PAIN SCALES - GENERAL
PAINLEVEL_OUTOF10: 6
PAINLEVEL_OUTOF10: 6
PAINLEVEL_OUTOF10: 0

## 2021-09-10 ASSESSMENT — PAIN DESCRIPTION - PAIN TYPE
TYPE: CHRONIC PAIN
TYPE: CHRONIC PAIN

## 2021-09-10 ASSESSMENT — PAIN DESCRIPTION - LOCATION
LOCATION: GENERALIZED
LOCATION: HIP

## 2021-09-10 ASSESSMENT — PAIN - FUNCTIONAL ASSESSMENT: PAIN_FUNCTIONAL_ASSESSMENT: ACTIVITIES ARE NOT PREVENTED

## 2021-09-10 ASSESSMENT — PAIN DESCRIPTION - DESCRIPTORS
DESCRIPTORS: ACHING;CONSTANT
DESCRIPTORS: ACHING;DISCOMFORT

## 2021-09-10 ASSESSMENT — PAIN DESCRIPTION - ONSET: ONSET: ON-GOING

## 2021-09-10 ASSESSMENT — PAIN DESCRIPTION - PROGRESSION: CLINICAL_PROGRESSION: NOT CHANGED

## 2021-09-10 NOTE — PLAN OF CARE
Problem: Altered Mood, Psychotic Behavior:  Goal: Able to verbalize decrease in frequency and intensity of hallucinations  Description: Able to verbalize decrease in frequency and intensity of hallucinations  9/10/2021 1019 by Venus Landin RN  Outcome: Met This Shift  Note: Patient denies having hallucinations this shift. 9/9/2021 2318 by Scout Tesfaye RN  Outcome: Met This Shift  Note: Denies hallucinations this shift. Goal: Able to verbalize reality based thinking  Description: Able to verbalize reality based thinking  9/10/2021 1019 by Venus Landin RN  Outcome: Met This Shift  Note: Patient alert and oriented x4 this shift. 9/9/2021 2318 by Scout Tesfaye RN  Outcome: Met This Shift  Note: Alert and oriented x 3 not to situation. Problem: Depressive Behavior With or Without Suicide Precautions:  Goal: Able to verbalize and/or display a decrease in depressive symptoms  Description: Able to verbalize and/or display a decrease in depressive symptoms  9/10/2021 1019 by Venus Landin RN  Outcome: Ongoing  Note: Patient reports continued feelings of depression this shift that have not improved since admission. 9/9/2021 2318 by Scout Tesfaye RN  Outcome: Met This Shift  Note: States her depression is less. Rates his mood a 5 with 10 being the best. Affect flat. Good eye contact. States \"I'm trying\" to be hopeful for the future. Good peer interaction. Goal: Ability to disclose and discuss suicidal ideas will improve  Description: Ability to disclose and discuss suicidal ideas will improve  9/10/2021 1019 by Venus Landin RN  Outcome: Met This Shift  Note: Patient denies having suicidal ideation this shift. 9/9/2021 2318 by Scout Tesfaye RN  Outcome: Met This Shift  Note: Denies suicidal ideations this shift.    Goal: Able to verbalize support systems  Description: Able to verbalize support systems  9/10/2021 1019 by Venus Landin RN  Outcome: Not Met This Shift  Note: Patient denies having support system this shift. 9/9/2021 2318 by Natalie Arriaga RN  Outcome: Not Met This Shift  Note: Denies having any support system. Problem: Discharge Planning:  Goal: Discharged to appropriate level of care  Description: Discharged to appropriate level of care  9/10/2021 1019 by Coreen Hoff RN  Outcome: Not Met This Shift  Note: Patient not discharged this shift. Patient continues to work toward discharge goal with care team.   9/9/2021 2318 by Natalie Arriaga RN  Outcome: Ongoing  Note: Discharge planning in progress. Plans on going to [facility] and follow up at [facility]. Problem: Anxiety:  Goal: Level of anxiety will decrease  Description: Level of anxiety will decrease  9/10/2021 1019 by Coreen Hoff RN  Outcome: Ongoing  Note: Patient reports continued feelings of anxiety this shift that she states are unchanged since admission. 9/9/2021 2318 by Natalie Arriaga RN  Outcome: Not Met This Shift  Note: Continues to be anxious this shift requested atarax this shift. Problem: Activity:  Goal: Sleeping patterns will improve  Description: Sleeping patterns will improve  9/9/2021 2318 by Natalie Arriaga RN  Outcome: Completed  Note: Slept 8.5 hours last evening. Denies need for prn sleep medication. Problem: Gas Exchange - Impaired:  Goal: Levels of oxygenation will improve  Description: Levels of oxygenation will improve  9/9/2021 2318 by Natalie Arriaga RN  Outcome: Completed  Note: Pulse ox 98%, respirations are easy and unlabored. Problem: Pain:  Goal: Pain level will decrease  Description: Pain level will decrease  9/10/2021 1019 by Coreen Hoff RN  Outcome: Ongoing  Note: Patient reports continued #6/10 pain in her hips bilat that is chronic. Patient has tylenol and IBP prn for pain. MD aware. .   9/9/2021 2318 by Natalie Arriaga RN  Outcome: Not Met This Shift  Note: Complained of bilateral hip pain and lower back pain which is chronic.  Rates her pain a 6 with 10 being the worst was given motrin with relief. Problem: Coping:  Goal: Ability to identify problematic behaviors that deter socialization will improve  Description: Ability to identify problematic behaviors that deter socialization will improve  9/10/2021 1019 by Emery Mccallum RN  Outcome: Met This Shift  Note: Patient interacts well with peers this shift. 9/9/2021 2318 by Amara Adler RN  Outcome: Ongoing     Problem: Role Relationship:  Goal: Ability to verbalize awareness of feelings that lead to decreased social interaction will improve  Description: Ability to verbalize awareness of feelings that lead to decreased social interaction will improve  9/10/2021 1019 by Emery Mccallum RN  Outcome: Met This Shift  Note: Patient interacts well with peers this shift.    9/9/2021 2318 by Amara Adler RN  Outcome: Ongoing
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
## 2021-09-10 NOTE — PROGRESS NOTES
Group Therapy Note    Date: 9/9/2021  Start Time: 2000  End Time:  2020  Number of Participants:     Type of Group: Wrap-Up    Wellness Binder Information  Module Name:    Session Number:      Patient's Goal:  To find a place to stay     Notes: Working towards goal     Status After Intervention:  Unchanged    Participation Level:  Active Listener and Interactive    Participation Quality: Attentive      Speech:  normal      Thought Process/Content: Logical      Affective Functioning: Flat      Mood: anxious and depressed      Level of consciousness:  Alert      Response to Learning: Able to verbalize current knowledge/experience, Able to verbalize/acknowledge new learning and Able to retain information      Endings: None Reported    Modes of Intervention: Support and Socialization      Discipline Responsible: RN      Signature:  Lexi Guthrie RN

## 2021-09-10 NOTE — BH NOTE
Group Therapy Note    Date: 9/10/2021  Start Time:  1000  End Time:   1947    Number of Participants: 4    Type of Group: Recreational/Social    Patient's Goal:  Increase socialization. Notes:   Patient was social while participating in Apples [employer] with others.      Status After Intervention:  Improved    Participation Level: Interactive    Participation Quality: Appropriate, Attentive and Sharing      Speech:  normal      Thought Process/Content: Logical      Affective Functioning: Congruent      Mood: euthymic      Level of consciousness:  Oriented x4      Response to Learning: Progressing to goal      Endings: None Reported    Modes of Intervention: Socialization and Activity      Discipline Responsible: Psychoeducational Specialist      Signature:  Brian Sage

## 2021-09-10 NOTE — GROUP NOTE
Group Therapy Note    Date: 9/10/2021    Group Start Time: 1100  Group End Time: 36  Group Topic: Healthy Living/Wellness    [facility]E    Caryl Pineda LPN        Group Therapy Note    Attendees: 5             Notes:  attended    Status After Intervention:  Improved    Participation Level:  Active Listener    Participation Quality: Appropriate and Attentive      Speech:  normal      Thought Process/Content: Logical      Affective Functioning: Flat      Level of consciousness:  Alert, Oriented x4 and Attentive      Response to Learning: Able to verbalize/acknowledge new learning and Able to retain information      Endings: None Reported    Modes of Intervention: Education and Socialization      Discipline Responsible: Licensed Practical Nurse/nursing students      Signature:  Carly Pineda LPN

## 2021-09-10 NOTE — BH NOTE
PLAN OF CARE:     Start Time: 0900  End Time:  0915    Group Topic:  Daily Goals    Group Type:   Goal Group    Intervention/Goal:  To increase support and identify daily goals    Attendance:  Participated in group    Affect:  bright    Behavior:   Cooperative and pleasant    Response:   appropriate    Daily Goal:    To find somewhere to go.      Progress:  Goal met

## 2021-09-10 NOTE — PLAN OF CARE
Denies suicidal ideations this shift. 9/9/2021 1617 by Diego Sun RN  Outcome: Ongoing  Note: Denies suicide plan  Goal: Able to verbalize support systems  Description: Able to verbalize support systems  9/9/2021 2318 by Jw Johnson RN  Outcome: Not Met This Shift  Note: Denies having any support system. 9/9/2021 1617 by Diego Sun RN  Outcome: Ongoing  Note: Denies any support syste     Problem: Discharge Planning:  Goal: Discharged to appropriate level of care  Description: Discharged to appropriate level of care  9/9/2021 2318 by Jw Johnson RN  Outcome: Ongoing  Note: Discharge planning in progress. Plans on going to [facility] and follow up at [facility]. 9/9/2021 1617 by Diego Sun RN  Outcome: Ongoing  Note: Patient voices needs before discharge. Discharge planner working with patient to achieve optimal discharge plans, specific to individual needs. Problem: Anxiety:  Goal: Level of anxiety will decrease  Description: Level of anxiety will decrease  9/9/2021 2318 by Jw Johnson RN  Outcome: Not Met This Shift  Note: Continues to be anxious this shift requested atarax this shift. 9/9/2021 1617 by Diego Sun RN  Outcome: Ongoing  Note: Patient is anxious as evidenced by patient's actions. Staff attempted to identify and relieve distress by talking with patient, refocusing on new activity, and provided clear limits. Offered self as a means of support and reassurance. Patient accepted medication without incident. Will continue to monitor patient's behaviors to determine effectiveness of interventions. Problem: Activity:  Goal: Sleeping patterns will improve  Description: Sleeping patterns will improve  9/9/2021 2318 by Jw Johnson RN  Outcome: Completed  Note: Slept 8.5 hours last evening. Denies need for prn sleep medication.    9/9/2021 1617 by Diego Sun RN  Outcome: Ongoing  Note: Slept 8.5 hours     Problem: Gas Exchange - Impaired:  Goal: Levels of oxygenation will improve  Description: Levels of oxygenation will improve  9/9/2021 2318 by Ambar Laws RN  Outcome: Completed  Note: Pulse ox 98%, respirations are easy and unlabored. 9/9/2021 1617 by Claudetta Grange, RN  Outcome: Ongoing     Problem: Pain:  Goal: Pain level will decrease  Description: Pain level will decrease  9/9/2021 2318 by Ambar Laws RN  Outcome: Not Met This Shift  Note: Complained of bilateral hip pain and lower back pain which is chronic. Rates her pain a 6 with 10 being the worst was given motrin with relief.    9/9/2021 1617 by Claudetta Grange, RN  Outcome: Ongoing  Note: Motrin given for back and hip discomfort, no further complaints voiced     Problem: Coping:  Goal: Ability to identify problematic behaviors that deter socialization will improve  Description: Ability to identify problematic behaviors that deter socialization will improve  9/9/2021 2318 by Ambar Laws RN  Outcome: Ongoing  9/9/2021 1617 by Claudetta Grange, RN  Outcome: Ongoing  9/9/2021 1242 by Forrest Wolfe  Outcome: Ongoing     Problem: Role Relationship:  Goal: Ability to verbalize awareness of feelings that lead to decreased social interaction will improve  Description: Ability to verbalize awareness of feelings that lead to decreased social interaction will improve  9/9/2021 2318 by Ambar Laws RN  Outcome: Ongoing  9/9/2021 1617 by Claudetta Grange, RN  Outcome: Ongoing  9/9/2021 1242 by Forrest Wolfe  Outcome: Ongoing

## 2021-09-11 VITALS
HEIGHT: 64 IN | WEIGHT: 290 LBS | RESPIRATION RATE: 16 BRPM | DIASTOLIC BLOOD PRESSURE: 76 MMHG | OXYGEN SATURATION: 97 % | TEMPERATURE: 97.4 F | HEART RATE: 82 BPM | SYSTOLIC BLOOD PRESSURE: 143 MMHG | BODY MASS INDEX: 49.51 KG/M2

## 2021-09-11 PROCEDURE — 6370000000 HC RX 637 (ALT 250 FOR IP): Performed by: PSYCHIATRY & NEUROLOGY

## 2021-09-11 PROCEDURE — 6370000000 HC RX 637 (ALT 250 FOR IP): Performed by: PHYSICIAN ASSISTANT

## 2021-09-11 PROCEDURE — 5130000000 HC BRIDGE APPOINTMENT

## 2021-09-11 PROCEDURE — 99238 HOSP IP/OBS DSCHRG MGMT 30/<: CPT | Performed by: NURSE PRACTITIONER

## 2021-09-11 RX ORDER — TRAZODONE HYDROCHLORIDE 50 MG/1
50 TABLET ORAL NIGHTLY PRN
Qty: 10 TABLET | Refills: 0 | Status: SHIPPED | OUTPATIENT
Start: 2021-09-11 | End: 2022-02-05

## 2021-09-11 RX ORDER — DULOXETINE 40 MG/1
40 CAPSULE, DELAYED RELEASE ORAL DAILY
Qty: 30 CAPSULE | Refills: 0 | Status: SHIPPED | OUTPATIENT
Start: 2021-09-11 | End: 2021-09-15 | Stop reason: HOSPADM

## 2021-09-11 RX ORDER — HYDROXYZINE 50 MG/1
50 TABLET, FILM COATED ORAL 3 TIMES DAILY PRN
Qty: 30 TABLET | Refills: 0 | Status: SHIPPED | OUTPATIENT
Start: 2021-09-11 | End: 2021-09-21

## 2021-09-11 RX ORDER — ATORVASTATIN CALCIUM 10 MG/1
10 TABLET, FILM COATED ORAL DAILY
Qty: 30 TABLET | Refills: 3 | Status: SHIPPED | OUTPATIENT
Start: 2021-09-11 | End: 2021-11-24

## 2021-09-11 RX ADMIN — ASPIRIN 81 MG: 81 TABLET, CHEWABLE ORAL at 11:11

## 2021-09-11 RX ADMIN — CARBAMAZEPINE 400 MG: 200 TABLET, EXTENDED RELEASE ORAL at 09:14

## 2021-09-11 RX ADMIN — BUSPIRONE HYDROCHLORIDE 30 MG: 10 TABLET ORAL at 09:11

## 2021-09-11 RX ADMIN — DULOXETINE HYDROCHLORIDE 40 MG: 20 CAPSULE, DELAYED RELEASE ORAL at 09:11

## 2021-09-11 ASSESSMENT — PAIN SCALES - GENERAL: PAINLEVEL_OUTOF10: 0

## 2021-09-11 NOTE — PROGRESS NOTES
Behavioral Health   Discharge Note    Pt discharged with followings belongings:   Dentures: None  Vision - Corrective Lenses: None  Hearing Aid: None  Jewelry: None  Body Piercings Removed: No  Clothing: Footwear, Pants, Shirt, Undergarments (Comment)  Were All Patient Medications Collected?: Not Applicable  Other Valuables: Cell phone, Other (Comment) (lighter)   Valuables sent home with patient. Patient left department with staff via ambulatory. Discharged to private residence. \"An Important Message from [employer] About Your Rights\" form photocopy original from admission and provided to pt at discharge n/a. Patient education on aftercare instructions: yes  Bridge Appointment completed: Reviewed Discharge Instructions with patient. Patient verbalizes understanding and agreement with the discharge plan using the teachback method. Information faxed to [facility]ange by staff. Patient verbalize understanding of AVS:  yes. Status EXAM upon discharge:  Status and Exam  Normal: No  Facial Expression: Flat, Brightened  Affect: Blunt  Level of Consciousness: Alert  Mood:Normal: No  Mood: Depressed, Anxious  Motor Activity:Normal: No  Motor Activity: Decreased  Interview Behavior: Cooperative  Preception: [place] to Person, [place] to Place, [place] to Time, [place] to Situation  Attention:Normal: Yes  Attention: Distractible  Thought Processes: Circumstantial  Thought Content:Normal: No  Thought Content: Preoccupations (regarding discharge)  Hallucinations: None  Delusions: No  Delusions: Other(See Comment) (paranoid)  Memory:Normal: Yes  Memory: Poor Recent  Insight and Judgment: No  Insight and Judgment: Poor Judgment, Poor Insight  Present Suicidal Ideation: No  Present Homicidal Ideation: No     Pt reports having \"hope for recovery\" She reports having support with her family , recognizes the importance of taking her medications as prescribed and attending all follow up appointment.  We reviewed the [facility] hopeline phone number in [place] to use when needing support. Pt voiced understanding her follow up appointment is with [facility]. Pt was encouraged to attend EA meetings for additional support in ongoing recovery.  Pt was provided educational handout with date/time of meeting location on Tuesdays at 7 pm.    Erika Lux RN

## 2021-09-11 NOTE — PLAN OF CARE
Problem: Altered Mood, Psychotic Behavior:  Goal: Able to verbalize decrease in frequency and intensity of hallucinations  9/10/2021 2350 by Estrada Farr RN  Outcome: Completed  Note: Patient denies. 9/10/2021 1019 by Aicha Cortes RN  Outcome: Met This Shift  Note: Patient denies having hallucinations this shift. Goal: Able to verbalize reality based thinking  9/10/2021 2350 by Estrada Farr RN  Outcome: Completed  Note: Patient is reality oriented. 9/10/2021 1019 by Aicha Cortes RN  Outcome: Met This Shift  Note: Patient alert and oriented x4 this shift. Problem: Substance Abuse:  Goal: Participates in care planning  9/10/2021 2350 by Estrada Farr RN  Outcome: Met This Shift  Note: Care plan reviewed with patient. Patient does verbalize understanding of the plan of care and does contribute to goal setting . 9/10/2021 1019 by Aicha Cortes RN  Outcome: Met This Shift  Note: Patient actively participates in care planning with care team this shift. Problem: Depressive Behavior With or Without Suicide Precautions:  Goal: Able to verbalize and/or display a decrease in depressive symptoms  9/10/2021 2350 by Estrada Farr RN  Outcome: Ongoing  Note: Patient reports that she continues to feel depressed since she missed work and will be behind in her bills. 9/10/2021 1019 by Aicha Cortes RN  Outcome: Ongoing  Note: Patient reports continued feelings of depression this shift that have not improved since admission. Goal: Ability to disclose and discuss suicidal ideas will improve  9/10/2021 2350 by Estrada Farr RN  Outcome: Met This Shift  Note: Patient denies suicidal thoughts this shift. 9/10/2021 1019 by Aicha Cortes RN  Outcome: Met This Shift  Note: Patient denies having suicidal ideation this shift. Goal: Able to verbalize support systems  9/10/2021 2350 by Estrada Farr RN  Outcome: Not Met This Shift  Note: Patient reports that she has no support system.   9/10/2021 1019 by Teressa Roque RN  Outcome: Not Met This Shift  Note: Patient denies having support system this shift. Problem: Discharge Planning:  Goal: Discharged to appropriate level of care  9/10/2021 2350 by Joey Woodruff RN  Outcome: Ongoing  Note: Discharge planning is in process. 9/10/2021 1019 by Teressa Roque RN  Outcome: Not Met This Shift  Note: Patient not discharged this shift. Patient continues to work toward discharge goal with care team.      Problem: Anxiety:  Goal: Level of anxiety will decrease  9/10/2021 2350 by Joey Woodruff RN  Outcome: Ongoing  Note: Patient reports that she continues to feel anxious. 9/10/2021 1019 by Teressa Roque RN  Outcome: Ongoing  Note: Patient reports continued feelings of anxiety this shift that she states are unchanged since admission. Problem: Pain:  Goal: Pain level will decrease  9/10/2021 2350 by Joey Woodruff RN  Outcome: Ongoing  Note: Patient reports having generalized pain rating at a #6. PRN Motrin given  9/10/2021 1019 by Teressa Roque RN  Outcome: Ongoing  Note: Patient reports continued #6/10 pain in her hips bilat that is chronic. Patient has tylenol and IBP prn for pain. MD aware. .      Problem: Role Relationship:  Goal: Ability to verbalize awareness of feelings that lead to decreased social interaction will improve  9/10/2021 1229 by Sim Morning  Outcome: Completed  9/10/2021 1019 by Teressa Roque RN  Outcome: Met This Shift  Note: Patient interacts well with peers this shift.
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
## 2021-09-11 NOTE — PROGRESS NOTES
Group Therapy Note    Date: 9/10/2021  Start Time: 2000  End Time:  2020      Type of Group: Wrap-Up and relaxatio  Patient's Goal: To be discharged to [place]   Notes:  Patient is now being discharged to her home. Status After Intervention:  Improved    Participation Level:  Active Listener and Interactive    Participation Quality: Appropriate, Attentive and Sharing      Speech:  pressured      Thought Process/Content: Logical      Affective Functioning: Congruent      Mood: anxious and depressed      Level of consciousness:  Alert and Oriented x4      Response to Learning: Able to verbalize current knowledge/experience      Endings: None Reported    Modes of Intervention: Support and Socialization      Discipline Responsible: Registered Nurse      Signature:  Janeth Torres RN

## 2021-09-11 NOTE — SUICIDE SAFETY PLAN
SAFETY PLAN    A suicide Safety Plan is a document that supports someone when they are having thoughts of suicide. Warning Signs that indicate a suicidal crisis may be developing: What (situations, thoughts, feelings, body sensations, behaviors, etc.) do you experience that lets you know you are beginning to think about suicide? 1. Anxiety  2. Hopeless  3. Believing No one is there for me     Internal Coping Strategies:  What things can I do (relaxation techniques, hobbies, physical activities, etc.) to take my mind off my problems without contacting another person? 1. Read Bible  2. [place]  3. Listen to [address] and social settings that provide distraction: Who can I call or where can I go to distract me? 1. Name:Yarelis  LVAQO:[phone]  2. Name: Abdi Kohler  DUJMZ:[phone]    3. Place:  [facility]        4. Place: [facility] whom I can ask for help: Who can I call when I need help - for example, friends, family, clergy, someone else? 1. Name: same as above                    Phone:   2. Name:  Phone:  3. Name:   Phone:    Professionals or [address] [place] [address] I can contact during a crisis: Who can I call for help - for example, my doctor, my psychiatrist, my psychologist, a mental health provider, a suicide hotline? SEE DISCHARGE SUMMARY  1. Clinician Name:   Phone:       Clinician Pager or Emergency Contact #:    2. Clinician Name:   Phone:       Clinician Pager or Emergency Contact #:    3. Suicide Prevention Lifeline: 5-002-725-TALK (4224)    4. [address] [place] [address] Emergency Services -  for example, [facility] suicide hotline, [facility] Hotline: SEE Discharge Summary      Emergency Services Address:      Emergency Services Phone:    Making the environment safe: How can I make my environment (house/apartment/living space) safer? For example, can I remove guns, medications, and other items? 1. It is safe  2.  Place positive messages around my place

## 2021-09-13 ENCOUNTER — TELEPHONE (OUTPATIENT)
Dept: PSYCHIATRY | Age: 46
End: 2021-09-13

## 2021-09-15 RX ORDER — DULOXETIN HYDROCHLORIDE 20 MG/1
40 CAPSULE, DELAYED RELEASE ORAL DAILY
Qty: 60 CAPSULE | Refills: 0 | Status: SHIPPED | OUTPATIENT
Start: 2021-09-15 | End: 2022-02-05

## 2021-11-04 ENCOUNTER — HOSPITAL ENCOUNTER (EMERGENCY)
Age: 46
Discharge: HOME | End: 2021-11-04
Payer: COMMERCIAL

## 2021-11-04 VITALS
OXYGEN SATURATION: 97 % | BODY MASS INDEX: 46.95 KG/M2 | HEART RATE: 90 BPM | DIASTOLIC BLOOD PRESSURE: 76 MMHG | TEMPERATURE: 97.2 F | RESPIRATION RATE: 24 BRPM | HEIGHT: 64 IN | WEIGHT: 275 LBS | SYSTOLIC BLOOD PRESSURE: 128 MMHG

## 2021-11-04 DIAGNOSIS — J44.1 COPD EXACERBATION (HCC): Primary | ICD-10-CM

## 2021-11-04 LAB — SARS-COV-2, NAA: NOT  DETECTED

## 2021-11-04 PROCEDURE — 99213 OFFICE O/P EST LOW 20 MIN: CPT

## 2021-11-04 PROCEDURE — 87635 SARS-COV-2 COVID-19 AMP PRB: CPT

## 2021-11-04 PROCEDURE — 99213 OFFICE O/P EST LOW 20 MIN: CPT | Performed by: NURSE PRACTITIONER

## 2021-11-04 PROCEDURE — 6370000000 HC RX 637 (ALT 250 FOR IP): Performed by: NURSE PRACTITIONER

## 2021-11-04 RX ORDER — IPRATROPIUM BROMIDE AND ALBUTEROL SULFATE 2.5; .5 MG/3ML; MG/3ML
1 SOLUTION RESPIRATORY (INHALATION) ONCE
Status: COMPLETED | OUTPATIENT
Start: 2021-11-04 | End: 2021-11-04

## 2021-11-04 RX ORDER — AZITHROMYCIN 250 MG/1
TABLET, FILM COATED ORAL
Qty: 6 TABLET | Refills: 0 | Status: SHIPPED | OUTPATIENT
Start: 2021-11-04 | End: 2021-11-24

## 2021-11-04 RX ORDER — PREDNISONE 20 MG/1
40 TABLET ORAL DAILY
Qty: 14 TABLET | Refills: 0 | Status: SHIPPED | OUTPATIENT
Start: 2021-11-04 | End: 2021-11-11

## 2021-11-04 RX ORDER — ALBUTEROL SULFATE 90 UG/1
2 AEROSOL, METERED RESPIRATORY (INHALATION) EVERY 6 HOURS PRN
Qty: 18 G | Refills: 0 | Status: SHIPPED | OUTPATIENT
Start: 2021-11-04 | End: 2022-02-05

## 2021-11-04 RX ADMIN — IPRATROPIUM BROMIDE AND ALBUTEROL SULFATE 1 AMPULE: .5; 3 SOLUTION RESPIRATORY (INHALATION) at 14:53

## 2021-11-04 ASSESSMENT — ENCOUNTER SYMPTOMS
SORE THROAT: 0
SHORTNESS OF BREATH: 1
DIARRHEA: 0
WHEEZING: 1
NAUSEA: 0
COUGH: 1
SINUS PRESSURE: 0
VOMITING: 0
CHEST TIGHTNESS: 1

## 2021-11-04 NOTE — ED NOTES
PT GIVEN DISCHARGE INSTRUCTIONS, VERBALIZES UNDERSTANDING. PT ASSESSMENT UNCHANGED, DISCHARGED IN STABLE CONDITION.         Tiara Hfufman RN  11/04/21 6756

## 2021-11-24 ENCOUNTER — HOSPITAL ENCOUNTER (EMERGENCY)
Age: 46
Discharge: HOME | End: 2021-11-24
Payer: COMMERCIAL

## 2021-11-24 VITALS
HEART RATE: 76 BPM | TEMPERATURE: 98.1 F | RESPIRATION RATE: 16 BRPM | SYSTOLIC BLOOD PRESSURE: 122 MMHG | OXYGEN SATURATION: 99 % | BODY MASS INDEX: 12.8 KG/M2 | DIASTOLIC BLOOD PRESSURE: 74 MMHG | WEIGHT: 75 LBS | HEIGHT: 64 IN

## 2021-11-24 DIAGNOSIS — N76.0 BACTERIAL VAGINOSIS: ICD-10-CM

## 2021-11-24 DIAGNOSIS — B96.89 BACTERIAL VAGINOSIS: ICD-10-CM

## 2021-11-24 DIAGNOSIS — Z86.19 HISTORY OF CANDIDIASIS OF VAGINA: ICD-10-CM

## 2021-11-24 DIAGNOSIS — N89.8 VAGINAL DISCHARGE: Primary | ICD-10-CM

## 2021-11-24 PROCEDURE — 99213 OFFICE O/P EST LOW 20 MIN: CPT

## 2021-11-24 PROCEDURE — 99213 OFFICE O/P EST LOW 20 MIN: CPT | Performed by: NURSE PRACTITIONER

## 2021-11-24 RX ORDER — FLUCONAZOLE 150 MG/1
150 TABLET ORAL ONCE
Qty: 1 TABLET | Refills: 0 | Status: SHIPPED | OUTPATIENT
Start: 2021-11-24 | End: 2021-11-24

## 2021-11-24 RX ORDER — METRONIDAZOLE 500 MG/1
500 TABLET ORAL 2 TIMES DAILY
Qty: 14 TABLET | Refills: 0 | Status: SHIPPED | OUTPATIENT
Start: 2021-11-24 | End: 2021-12-01

## 2021-11-24 ASSESSMENT — ENCOUNTER SYMPTOMS
NAUSEA: 0
ABDOMINAL PAIN: 0
BACK PAIN: 0

## 2021-11-24 NOTE — ED PROVIDER NOTES
DISPOSITION/ PLAN     Patient advised to follow-up with her primary care provider or OB/GYN if she would have any changes. Patient is also to monitor for any abdominal pain, fever, chills or any dysuria to follow-up with her primary care provider return here as needed. Patient left ambulatory without any problems.       PATIENT REFERRED TO:  PANTERA Marc CNP  [address] [facility] [address] / [facility] [address]      DISCHARGE MEDICATIONS:  Discharge Medication List as of 11/24/2021  5:02 PM      START taking these medications    Details   fluconazole (DIFLUCAN) 150 MG tablet Take 1 tablet by mouth once for 1 dose, Disp-1 tablet, R-0Normal      metroNIDAZOLE (FLAGYL) 500 MG tablet Take 1 tablet by mouth 2 times daily for 7 days, Disp-14 tablet, R-0Normal             Discharge Medication List as of 11/24/2021  5:02 PM      STOP taking these medications       azithromycin (ZITHROMAX) 250 MG tablet Comments:   Reason for Stopping:               Discharge Medication List as of 11/24/2021  5:02 PM          PANTERA Millan CNP    (Please note that portions of this note were completed with a voice recognition program. Efforts were made to edit the dictations but occasionally words are mis-transcribed.)           PANTERA Millan CNP  11/24/21 7759

## 2021-11-24 NOTE — ED TRIAGE NOTES
Pt to [facility] ambulatory with a vaginal odor. This started yesterday. Pt just finished taking a Z- Pack.

## 2021-11-24 NOTE — ED NOTES
Pt verbalized discharge instructions. Pt informed to go to ER if develop chest pain, shortness of breath or abdominal pain. Pt ambulatory out in stable condition. Assessment unchanged.        Noel Abel RN  11/24/21 4109

## 2022-02-05 ENCOUNTER — HOSPITAL ENCOUNTER (EMERGENCY)
Age: 47
Discharge: HOME | End: 2022-02-05
Payer: COMMERCIAL

## 2022-02-05 ENCOUNTER — APPOINTMENT (OUTPATIENT)
Dept: GENERAL RADIOLOGY | Age: 47
End: 2022-02-05
Payer: COMMERCIAL

## 2022-02-05 VITALS
TEMPERATURE: 98.2 F | HEART RATE: 86 BPM | DIASTOLIC BLOOD PRESSURE: 84 MMHG | BODY MASS INDEX: 46.95 KG/M2 | WEIGHT: 275 LBS | OXYGEN SATURATION: 98 % | SYSTOLIC BLOOD PRESSURE: 136 MMHG | RESPIRATION RATE: 20 BRPM | HEIGHT: 64 IN

## 2022-02-05 DIAGNOSIS — J44.1 COPD EXACERBATION (HCC): Primary | ICD-10-CM

## 2022-02-05 DIAGNOSIS — J40 BRONCHITIS: ICD-10-CM

## 2022-02-05 PROCEDURE — 96372 THER/PROPH/DIAG INJ SC/IM: CPT

## 2022-02-05 PROCEDURE — 6360000002 HC RX W HCPCS: Performed by: NURSE PRACTITIONER

## 2022-02-05 PROCEDURE — 71046 X-RAY EXAM CHEST 2 VIEWS: CPT

## 2022-02-05 PROCEDURE — 99214 OFFICE O/P EST MOD 30 MIN: CPT

## 2022-02-05 PROCEDURE — 99213 OFFICE O/P EST LOW 20 MIN: CPT | Performed by: NURSE PRACTITIONER

## 2022-02-05 RX ORDER — AZITHROMYCIN 250 MG/1
250 TABLET, FILM COATED ORAL SEE ADMIN INSTRUCTIONS
Qty: 6 TABLET | Refills: 0 | Status: SHIPPED | OUTPATIENT
Start: 2022-02-05 | End: 2022-02-10

## 2022-02-05 RX ORDER — ALBUTEROL SULFATE 90 UG/1
2 AEROSOL, METERED RESPIRATORY (INHALATION) 4 TIMES DAILY PRN
Qty: 54 G | Refills: 1 | Status: SHIPPED | OUTPATIENT
Start: 2022-02-05

## 2022-02-05 RX ORDER — METHYLPREDNISOLONE ACETATE 80 MG/ML
80 INJECTION, SUSPENSION INTRA-ARTICULAR; INTRALESIONAL; INTRAMUSCULAR; SOFT TISSUE ONCE
Status: COMPLETED | OUTPATIENT
Start: 2022-02-05 | End: 2022-02-05

## 2022-02-05 RX ORDER — DEXTROMETHORPHAN HYDROBROMIDE AND PROMETHAZINE HYDROCHLORIDE 15; 6.25 MG/5ML; MG/5ML
5 SYRUP ORAL 4 TIMES DAILY PRN
Qty: 180 ML | Refills: 0 | Status: SHIPPED | OUTPATIENT
Start: 2022-02-05 | End: 2022-02-12

## 2022-02-05 RX ORDER — METHYLPREDNISOLONE ACETATE 40 MG/ML
40 INJECTION, SUSPENSION INTRA-ARTICULAR; INTRALESIONAL; INTRAMUSCULAR; SOFT TISSUE ONCE
Status: COMPLETED | OUTPATIENT
Start: 2022-02-05 | End: 2022-02-05

## 2022-02-05 RX ORDER — ALBUTEROL SULFATE 2.5 MG/3ML
2.5 SOLUTION RESPIRATORY (INHALATION) EVERY 6 HOURS PRN
Qty: 120 EACH | Refills: 0 | Status: SHIPPED | OUTPATIENT
Start: 2022-02-05

## 2022-02-05 RX ORDER — ALBUTEROL SULFATE 2.5 MG/3ML
2.5 SOLUTION RESPIRATORY (INHALATION) ONCE
Status: COMPLETED | OUTPATIENT
Start: 2022-02-05 | End: 2022-02-05

## 2022-02-05 RX ORDER — FLUTICASONE PROPIONATE 110 UG/1
1 AEROSOL, METERED RESPIRATORY (INHALATION) 2 TIMES DAILY
Qty: 12 G | Refills: 0 | Status: SHIPPED | OUTPATIENT
Start: 2022-02-05

## 2022-02-05 RX ORDER — METHYLPREDNISOLONE 4 MG/1
TABLET ORAL
Qty: 1 KIT | Refills: 0 | Status: SHIPPED | OUTPATIENT
Start: 2022-02-05 | End: 2022-02-11

## 2022-02-05 RX ADMIN — METHYLPREDNISOLONE ACETATE 40 MG: 40 INJECTION, SUSPENSION INTRA-ARTICULAR; INTRALESIONAL; INTRAMUSCULAR; SOFT TISSUE at 16:53

## 2022-02-05 RX ADMIN — ALBUTEROL SULFATE 2.5 MG: 2.5 SOLUTION RESPIRATORY (INHALATION) at 16:44

## 2022-02-05 RX ADMIN — METHYLPREDNISOLONE ACETATE 80 MG: 80 INJECTION, SUSPENSION INTRA-ARTICULAR; INTRALESIONAL; INTRAMUSCULAR; SOFT TISSUE at 16:53

## 2022-02-05 ASSESSMENT — ENCOUNTER SYMPTOMS
SORE THROAT: 1
EYE DISCHARGE: 0
ALLERGIC/IMMUNOLOGIC NEGATIVE: 1
SINUS CONGESTION: 1
EYES NEGATIVE: 1
GASTROINTESTINAL NEGATIVE: 1
COUGH: 1
SHORTNESS OF BREATH: 1
CHEST TIGHTNESS: 1

## 2022-02-05 ASSESSMENT — PAIN DESCRIPTION - DESCRIPTORS: DESCRIPTORS: BURNING

## 2022-02-05 ASSESSMENT — PAIN SCALES - GENERAL: PAINLEVEL_OUTOF10: 8

## 2022-02-05 ASSESSMENT — PAIN DESCRIPTION - ONSET: ONSET: GRADUAL

## 2022-02-05 ASSESSMENT — PAIN DESCRIPTION - PROGRESSION: CLINICAL_PROGRESSION: GRADUALLY WORSENING

## 2022-02-05 ASSESSMENT — PAIN DESCRIPTION - FREQUENCY: FREQUENCY: CONTINUOUS

## 2022-02-05 ASSESSMENT — PAIN DESCRIPTION - PAIN TYPE: TYPE: ACUTE PAIN

## 2022-02-05 ASSESSMENT — PAIN DESCRIPTION - LOCATION: LOCATION: THROAT

## 2022-02-05 NOTE — ED PROVIDER NOTES
[id number]       Chief Complaint   Patient presents with    Shortness of Breath       Nurses Notes reviewed and I agree except as noted in the HPI. HISTORY OF PRESENT ILLNESS   Janak Crabtree is a 51 y.o. female who presents The history is provided by the patient. Cough  Cough characteristics:  Non-productive, croupy, hacking, harsh, nocturnal and vomit-inducing  Sputum characteristics:  Nondescript  Severity:  Severe  Onset quality:  Gradual  Duration:  3 weeks  Timing:  Intermittent  Progression:  Worsening  Chronicity:  Recurrent  Smoker: yes    Context: exposure to allergens, sick contacts, smoke exposure, upper respiratory infection, weather changes and with activity    Relieved by:  Rest, fluids, decongestant and beta-agonist inhaler  Worsened by: Activity, environmental changes, lying down and exposure to cold air  Ineffective treatments:  Rest and fluids  Associated symptoms: headaches, shortness of breath, sinus congestion and sore throat    Associated symptoms: no ear pain and no eye discharge    Shortness of breath:     Severity:  Mild    Onset quality:  Gradual    Duration:  3 weeks    Timing:  Intermittent    Progression:  Waxing and waning        REVIEW OF SYSTEMS     Review of Systems   Constitutional: Positive for activity change, appetite change and fatigue. HENT: Positive for sore throat. Negative for ear pain. Eyes: Negative. Negative for discharge. Respiratory: Positive for cough, chest tightness and shortness of breath. Cardiovascular: Negative for palpitations and leg swelling. Gastrointestinal: Negative. Endocrine: Negative for cold intolerance and heat intolerance. Genitourinary: Negative. Musculoskeletal: Negative. Skin: Negative. Allergic/Immunologic: Negative. Neurological: Positive for headaches. Hematological: Negative for adenopathy. Does not bruise/bleed easily. Psychiatric/Behavioral: Negative. PAST MEDICAL HISTORY         Diagnosis Date    Anxiety     Asthma     Bipolar 1 disorder ([address]     COPD (chronic obstructive pulmonary disease) (HCC)     Depression     PTSD (post-traumatic stress disorder)        SURGICAL HISTORY     Patient  has a past surgical history that includes Tubal ligation. CURRENT MEDICATIONS       Discharge Medication List as of 2/5/2022  5:16 PM          ALLERGIES     Patient is is allergic to shellfish allergy and penicillins. FAMILY HISTORY     Patient'sfamily history includes Asthma in her mother; COPD in her mother. SOCIAL HISTORY     Patient  reports that she has been smoking cigarettes. She has a 10.00 pack-year smoking history. She has never used smokeless tobacco. She reports that she does not drink alcohol and does not use drugs. PHYSICAL EXAM     ED TRIAGE VITALS  BP: 136/84, Temp: 98.2 °F (36.8 °C), Pulse: 86, Resp: 20, SpO2: 98 %  Physical Exam  Vitals and nursing note reviewed. Constitutional:       Appearance: She is well-developed and normal weight. She is ill-appearing. HENT:      Head: Normocephalic. Right Ear: Ear canal and external ear normal.      Left Ear: Ear canal and external ear normal.      Nose: Congestion present. Mouth/Throat:      Mouth: Mucous membranes are moist.      Pharynx: Posterior oropharyngeal erythema present. Eyes:      Conjunctiva/sclera: Conjunctivae normal.   Neck:      Vascular: No hepatojugular reflux. Trachea: No tracheal deviation. Cardiovascular:      Rate and Rhythm: Normal rate and regular rhythm. Pulses: Normal pulses. Heart sounds: Normal heart sounds. Pulmonary:      Effort: Pulmonary effort is normal.      Breath sounds: Examination of the right-upper field reveals wheezing and rhonchi. Examination of the left-upper field reveals wheezing and rhonchi. Examination of the right-middle field reveals wheezing.  Examination of the left-middle field reveals wheezing. Examination of the right-lower field reveals decreased breath sounds. Examination of the left-lower field reveals decreased breath sounds. Decreased breath sounds, wheezing and rhonchi present. No rales. Abdominal:      General: Abdomen is flat. Palpations: Abdomen is soft. Musculoskeletal:         General: Normal range of motion. Cervical back: Normal range of motion and neck supple. Tenderness present. Lymphadenopathy:      Cervical: Cervical adenopathy present. Skin:     General: Skin is warm and dry. Capillary Refill: Capillary refill takes less than 2 seconds. Coloration: Skin is pale. Neurological:      General: No focal deficit present. Mental Status: She is alert and oriented to person, place, and time. Mental status is at baseline. Psychiatric:         Mood and Affect: Mood is anxious. Behavior: Behavior normal.         Thought Content: Thought content normal.         DIAGNOSTIC RESULTS   Labs: No results found for this visit on 02/05/22. IMAGING:  XR CHEST (2 VW)   Final Result      No acute intrathoracic process. **This report has been created using voice recognition software. It may contain minor errors which are inherent in voice recognition technology. **      Final report electronically signed by Dr. Lucia Barnes on 2/5/2022 4:49 PM        URGENT CARE COURSE:     Vitals:    02/05/22 1627 02/05/22 1720   BP: (!) 141/91 136/84   Pulse: 90 86   Resp: 24 20   Temp: 98.2 °F (36.8 °C)    TempSrc: Temporal    SpO2: 98% 98%   Weight: 275 lb (124.7 kg)    Height: 5' 4\" (1.626 m)        Medications   albuterol (PROVENTIL) nebulizer solution 2.5 mg (2.5 mg Nebulization Given 2/5/22 1644)   methylPREDNISolone acetate (DEPO-MEDROL) injection 80 mg (80 mg IntraMUSCular Given 2/5/22 1653)   methylPREDNISolone acetate (DEPO-MEDROL) injection 40 mg (40 mg IntraMUSCular Given 2/5/22 1653)     PROCEDURES:  None  FINALIMPRESSION    I have reviewed the patient's medical history in detail and updated the computerized patient record. HPI/ROS per the patient and caregiver. Overall non toxic in appearance. Answers questions appropriately. Conditions discussed and addressed this visit include:     Patient appears ill but non-toxic and well hydrated. Has been ill for the last 3 weeks. No covid test. Cough is harsh and barking. Responded well ot the breathing treatment and to the steroid. Tolerating fluids. disucssed all findings with the patient. Patient is to take medications as directed. Continue all home medications. Potential side effects of the medications were reviewed with the patient in detail. The patient can increase fluids. The patient can have Tylenol or Motrin for pain and fever as needed. Discussed with patient signs and symptoms that would require emergent evaluation and treatment. The patient will follow-up with their family physician or go to the ER if they develop any worsening symptoms. The patient was discharged in stable condition      1. COPD exacerbation ([address]    2.  Bronchitis        DISPOSITION/PLAN   DISPOSITION      PATIENT REFERRED TO:  PANTERA Ayers - [facility]  [address]    In 3 days  As needed, If symptoms worsen    DISCHARGE MEDICATIONS:  Discharge Medication List as of 2/5/2022  5:16 PM      START taking these medications    Details   methylPREDNISolone (MEDROL, MIRYAM,) 4 MG tablet Take by mouth., Disp-1 kit, R-0Normal      promethazine-dextromethorphan (PROMETHAZINE-DM) 6.25-15 MG/5ML syrup Take 5 mLs by mouth 4 times daily as needed for Cough, Disp-180 mL, R-0Normal      azithromycin (ZITHROMAX) 250 MG tablet Take 1 tablet by mouth See Admin Instructions for 5 days 500mg on day 1 followed by 250mg on days 2 - 5, Disp-6 tablet, R-0Normal      fluticasone (FLOVENT HFA) 110 MCG/ACT inhaler Inhale 1 puff into the lungs 2 times daily, Disp-12 g, R-0Normal      albuterol (PROVENTIL) (2.5 MG/3ML) 0.083% nebulizer solution Take 3 mLs by nebulization every 6 hours as needed for Wheezing, Disp-120 each, R-0Normal           Discharge Medication List as of 2/5/2022  5:16 PM      CONTINUE these medications which have CHANGED    Details   albuterol sulfate HFA (VENTOLIN HFA) 108 (90 Base) MCG/ACT inhaler Inhale 2 puffs into the lungs 4 times daily as needed for Wheezing, Disp-54 g, R-1Normal             PANTERA Kelley - PANTERA Quijano - TONIE  02/05/22 1732

## 2022-02-05 NOTE — ED TRIAGE NOTES
Pt to [facility] ambulatory with SOB. This started 3 weeks ago. Pt has wheezing present in all fields. Peripheral pulses present x 2. Brisk capillary refill present in fingers. No pain present. Pt has COPD.

## 2022-02-16 ENCOUNTER — HOSPITAL ENCOUNTER (OUTPATIENT)
Age: 47
Setting detail: SPECIMEN
Discharge: HOME | End: 2022-02-16

## 2022-02-16 LAB
ALBUMIN SERPL-MCNC: 4 G/DL (ref 3.5–5.2)
ALBUMIN/GLOBULIN RATIO: 1.9 (ref 1–2.5)
ALP BLD-CCNC: 83 U/L (ref 35–104)
ALT SERPL-CCNC: 22 U/L (ref 5–33)
ANION GAP SERPL CALCULATED.3IONS-SCNC: 16 MMOL/L (ref 9–17)
AST SERPL-CCNC: 12 U/L
BILIRUB SERPL-MCNC: 0.26 MG/DL (ref 0.3–1.2)
BUN BLDV-MCNC: 18 MG/DL (ref 6–20)
BUN/CREAT BLD: ABNORMAL (ref 9–20)
CALCIUM SERPL-MCNC: 9 MG/DL (ref 8.6–10.4)
CHLORIDE BLD-SCNC: 100 MMOL/L (ref 98–107)
CHOLESTEROL/HDL RATIO: 2.8
CHOLESTEROL: 184 MG/DL
CO2: 24 MMOL/L (ref 20–31)
CREAT SERPL-MCNC: 0.65 MG/DL (ref 0.5–0.9)
GFR AFRICAN AMERICAN: >60 ML/MIN
GFR NON-AFRICAN AMERICAN: >60 ML/MIN
GFR SERPL CREATININE-BSD FRML MDRD: ABNORMAL ML/MIN/{1.73_M2}
GFR SERPL CREATININE-BSD FRML MDRD: ABNORMAL ML/MIN/{1.73_M2}
GLUCOSE BLD-MCNC: 94 MG/DL (ref 70–99)
HCT VFR BLD CALC: 44.5 % (ref 36.3–47.1)
HDLC SERPL-MCNC: 65 MG/DL
HEMOGLOBIN: 14.6 G/DL (ref 11.9–15.1)
LDL CHOLESTEROL: 77 MG/DL (ref 0–130)
MCH RBC QN AUTO: 30.8 PG (ref 25.2–33.5)
MCHC RBC AUTO-ENTMCNC: 32.8 G/DL (ref 28.4–34.8)
MCV RBC AUTO: 93.9 FL (ref 82.6–102.9)
NRBC AUTOMATED: 0 PER 100 WBC
PDW BLD-RTO: 13.4 % (ref 11.8–14.4)
PLATELET # BLD: 304 K/UL (ref 138–453)
PMV BLD AUTO: 10 FL (ref 8.1–13.5)
POTASSIUM SERPL-SCNC: 4.4 MMOL/L (ref 3.7–5.3)
RBC # BLD: 4.74 M/UL (ref 3.95–5.11)
SODIUM BLD-SCNC: 140 MMOL/L (ref 135–144)
TOTAL PROTEIN: 6.1 G/DL (ref 6.4–8.3)
TRIGL SERPL-MCNC: 209 MG/DL
TSH SERPL DL<=0.05 MIU/L-ACNC: 3.29 MIU/L (ref 0.3–5)
VLDLC SERPL CALC-MCNC: ABNORMAL MG/DL (ref 1–30)
WBC # BLD: 11.6 K/UL (ref 3.5–11.3)

## 2022-03-14 ENCOUNTER — HOSPITAL ENCOUNTER (EMERGENCY)
Age: 47
Discharge: HOME | End: 2022-03-14
Payer: COMMERCIAL

## 2022-03-14 VITALS
DIASTOLIC BLOOD PRESSURE: 69 MMHG | TEMPERATURE: 97 F | RESPIRATION RATE: 16 BRPM | OXYGEN SATURATION: 97 % | SYSTOLIC BLOOD PRESSURE: 114 MMHG | HEART RATE: 90 BPM

## 2022-03-14 DIAGNOSIS — S39.012A STRAIN OF LUMBAR REGION, INITIAL ENCOUNTER: Primary | ICD-10-CM

## 2022-03-14 LAB
BILIRUBIN URINE: NEGATIVE
BLOOD, URINE: NEGATIVE
CHARACTER, URINE: CLEAR
COLOR: YELLOW
GLUCOSE URINE: NEGATIVE MG/DL
KETONES, URINE: NEGATIVE
LEUKOCYTE ESTERASE, URINE: NEGATIVE
NITRITE, URINE: NEGATIVE
PH UA: 6 (ref 5–9)
PROTEIN UA: NEGATIVE MG/DL
SPECIFIC GRAVITY UA: >= 1.03 (ref 1–1.03)
UROBILINOGEN, URINE: 0.2 EU/DL (ref 0.2–1)

## 2022-03-14 PROCEDURE — 81003 URINALYSIS AUTO W/O SCOPE: CPT

## 2022-03-14 PROCEDURE — 99213 OFFICE O/P EST LOW 20 MIN: CPT

## 2022-03-14 PROCEDURE — 99213 OFFICE O/P EST LOW 20 MIN: CPT | Performed by: NURSE PRACTITIONER

## 2022-03-14 RX ORDER — MELOXICAM 15 MG/1
15 TABLET ORAL DAILY
Qty: 30 TABLET | Refills: 0 | Status: SHIPPED | OUTPATIENT
Start: 2022-03-14

## 2022-03-14 RX ORDER — TIZANIDINE 4 MG/1
4 TABLET ORAL 4 TIMES DAILY PRN
Qty: 40 TABLET | Refills: 0 | Status: SHIPPED | OUTPATIENT
Start: 2022-03-14

## 2022-03-14 RX ORDER — ATORVASTATIN CALCIUM 10 MG/1
10 TABLET, FILM COATED ORAL DAILY
COMMUNITY

## 2022-03-14 ASSESSMENT — ENCOUNTER SYMPTOMS
BACK PAIN: 1
CHEST TIGHTNESS: 0
SHORTNESS OF BREATH: 0
RHINORRHEA: 0
VOMITING: 0
SORE THROAT: 0
NAUSEA: 0
DIARRHEA: 0
COUGH: 0

## 2022-03-14 NOTE — ED PROVIDER NOTES
[facility]  Urgent Care Encounter       CHIEF COMPLAINT       Chief Complaint   Patient presents with    Back Pain       Nurses Notes reviewed and I agree except as noted in the HPI. HISTORY OF PRESENT ILLNESS   Marcin Dwyer is a 51 y.o. female who presents to the [facility] urgent care for evaluation of lumbar back pain. She reports the back pain started 1 to 2 days ago. She denies injury or lifting or twisting injury. She denies dysuria, urgency, or frequency. She denies incontinence of bowel or bladder. She denies saddle paresthesia. She denies of the pain radiates. She denies alleviating or aggravating factors. She does report that she took Aleve without relief. The history is provided by the patient. No  was used. REVIEW OF SYSTEMS     Review of Systems   Constitutional: Negative for activity change, appetite change, chills, fatigue and fever. HENT: Negative for ear discharge, ear pain, rhinorrhea and sore throat. Respiratory: Negative for cough, chest tightness and shortness of breath. Cardiovascular: Negative for chest pain. Gastrointestinal: Negative for diarrhea, nausea and vomiting. Genitourinary: Negative for dysuria. Musculoskeletal: Positive for back pain. Skin: Negative for rash. Allergic/Immunologic: Negative for environmental allergies and food allergies. Neurological: Negative for dizziness and headaches. PAST MEDICAL HISTORY         Diagnosis Date    Anxiety     Asthma     Bipolar 1 disorder ([facility] [address]     COPD (chronic obstructive pulmonary disease) ([facility])     Depression     PTSD (post-traumatic stress disorder)        SURGICALHISTORY     Patient  has a past surgical history that includes Tubal ligation.     CURRENT MEDICATIONS       Previous Medications    ALBUTEROL (PROVENTIL) (2.5 MG/3ML) 0.083% NEBULIZER SOLUTION    Take 3 mLs by nebulization every 6 hours as needed for Wheezing    ALBUTEROL SULFATE HFA (VENTOLIN HFA) 108 (90 BASE) MCG/ACT INHALER    Inhale 2 puffs into the lungs 4 times daily as needed for Wheezing    ATORVASTATIN (LIPITOR) 10 MG TABLET    Take 10 mg by mouth daily    FLUTICASONE (FLOVENT HFA) 110 MCG/ACT INHALER    Inhale 1 puff into the lungs 2 times daily    METFORMIN (GLUCOPHAGE) 500 MG TABLET    Take 1,000 mg by mouth nightly       ALLERGIES     Patient is is allergic to blue dyes (parenteral) and penicillins. Patients   Immunization History   Administered Date(s) Administered    Hepatitis A Vaccine 12/11/2012    Influenza Virus Vaccine 10/15/2019, 12/02/2020    Influenza, High Dose (Fluzone 65 yrs and older) 01/27/2016, 10/11/2018    Influenza, Intradermal, Preservative free 12/11/2012    Pneumococcal Polysaccharide ([id number]) 09/20/2018       FAMILY HISTORY     Patient's family history includes Asthma in her mother; COPD in her mother. SOCIAL HISTORY     Patient  reports that she has been smoking cigarettes. She has a 10.00 pack-year smoking history. She has never used smokeless tobacco. She reports that she does not drink alcohol and does not use drugs. PHYSICAL EXAM     ED TRIAGE VITALS  BP: 114/69, Temp: 97 °F (36.1 °C), Pulse: 90, Resp: 16, SpO2: 97 %,Estimated body mass index is 47.2 kg/m² as calculated from the following:    Height as of 2/5/22: 5' 4\" (1.626 m). Weight as of 2/5/22: 275 lb (124.7 kg). ,Patient's last menstrual period was 04/18/2021. Physical Exam  Vitals and nursing note reviewed. Constitutional:       General: She is not in acute distress. Appearance: Normal appearance. She is not ill-appearing, toxic-appearing or diaphoretic. HENT:      Head: Normocephalic. Right Ear: Ear canal and external ear normal.      Left Ear: Ear canal and external ear normal.      Nose: Nose normal. No congestion or rhinorrhea. Mouth/Throat:      Mouth: Mucous membranes are moist.      Pharynx: Oropharynx is clear.  No oropharyngeal exudate or posterior oropharyngeal erythema. Cardiovascular:      Rate and Rhythm: Normal rate. Pulses: Normal pulses. Pulmonary:      Effort: Pulmonary effort is normal. No respiratory distress. Breath sounds: No stridor. No wheezing or rhonchi. Abdominal:      General: Abdomen is flat. Bowel sounds are normal.      Palpations: Abdomen is soft. Musculoskeletal:         General: No swelling or tenderness. Normal range of motion. Cervical back: Normal range of motion. Back:    Neurological:      General: No focal deficit present. Mental Status: She is alert and oriented to person, place, and time. Psychiatric:         Mood and Affect: Mood normal.         Behavior: Behavior normal.         DIAGNOSTIC RESULTS     Labs:  Results for orders placed or performed during the hospital encounter of 03/14/22   Urinalysis   Result Value Ref Range    Glucose, Ur Negative NEGATIVE mg/dl    Bilirubin Urine Negative NEGATIVE    Ketones, Urine Negative NEGATIVE    Specific Gravity, UA >=1.030 1.002 - 1.030    Blood, Urine Negative NEGATIVE    pH, UA 6.00 5.0 - 9.0    Protein, UA Negative NEGATIVE mg/dl    Urobilinogen, Urine 0.20 0.2 - 1.0 eu/dl    Nitrite, Urine Negative NEGATIVE    Leukocyte Esterase, Urine Negative NEGATIVE    Color, UA Yellow STRAW-YELLOW    Character, Urine Clear CLEAR-SL CLOUD       IMAGING:    No orders to display         EKG: None      URGENT CARE COURSE:     Vitals:    03/14/22 1645   BP: 114/69   Pulse: 90   Resp: 16   Temp: 97 °F (36.1 °C)   SpO2: 97%       Medications - No data to display         PROCEDURES:  None    FINAL IMPRESSION      1. Strain of lumbar region, initial encounter          DISPOSITION/ PLAN     Patient seen and evaluated for lumbar back pain. Assessment consistent with likely muscle skeletal strain. She is provided a prescription for Mobic and Zanaflex. Instructed to use over-the-counter Tylenol as needed for breakthrough pain.   Instructed to complete gentle stretching. Instructed to follow-up with PCP in the next 3 to 5 days with worsening symptoms. She is agreeable to above plan and denies questions or concerns at this time.       PATIENT REFERRED TO:  PANTERA Melara CNP  [address] [place] [address] / [facility] [id number]      DISCHARGE MEDICATIONS:  New Prescriptions    MELOXICAM (MOBIC) 15 MG TABLET    Take 1 tablet by mouth daily    TIZANIDINE (ZANAFLEX) 4 MG TABLET    Take 1 tablet by mouth 4 times daily as needed (back pain)       Discontinued Medications    No medications on file       Current Discharge Medication List          PANTERA Haney CNP    (Please note that portions of this note were completed with a voice recognition program. Efforts were made to edit the dictations but occasionally words are mis-transcribed.)           PANTERA Haney CNP  03/14/22 9632

## 2022-03-14 NOTE — ED NOTES
Patient verbalized understanding of discharge instructions and medications prescribed. Denies questions or concerns at this time.       Kalyani Hartman RN  03/14/22 6743

## 2022-03-21 ENCOUNTER — HOSPITAL ENCOUNTER (OUTPATIENT)
Age: 47
Setting detail: SPECIMEN
Discharge: HOME | End: 2022-03-21

## 2022-03-21 LAB
ALBUMIN SERPL-MCNC: 4.5 G/DL (ref 3.5–5.2)
ALBUMIN/GLOBULIN RATIO: 1.9 (ref 1–2.5)
ALP BLD-CCNC: 89 U/L (ref 35–104)
ALT SERPL-CCNC: 29 U/L (ref 5–33)
ANION GAP SERPL CALCULATED.3IONS-SCNC: 16 MMOL/L (ref 9–17)
AST SERPL-CCNC: 14 U/L
BILIRUB SERPL-MCNC: 0.36 MG/DL (ref 0.3–1.2)
BUN BLDV-MCNC: 19 MG/DL (ref 6–20)
CALCIUM SERPL-MCNC: 9.8 MG/DL (ref 8.6–10.4)
CHLORIDE BLD-SCNC: 103 MMOL/L (ref 98–107)
CO2: 21 MMOL/L (ref 20–31)
CREAT SERPL-MCNC: 0.74 MG/DL (ref 0.5–0.9)
GFR AFRICAN AMERICAN: >60 ML/MIN
GFR NON-AFRICAN AMERICAN: >60 ML/MIN
GFR SERPL CREATININE-BSD FRML MDRD: NORMAL ML/MIN/{1.73_M2}
GLUCOSE BLD-MCNC: 93 MG/DL (ref 70–99)
HEPATITIS C ANTIBODY: NONREACTIVE
POTASSIUM SERPL-SCNC: 4.8 MMOL/L (ref 3.7–5.3)
SODIUM BLD-SCNC: 140 MMOL/L (ref 135–144)
TOTAL PROTEIN: 6.9 G/DL (ref 6.4–8.3)

## 2022-03-22 LAB
HPV SAMPLE: ABNORMAL
HPV, GENOTYPE 16: NOT DETECTED
HPV, GENOTYPE 18: NOT DETECTED
HPV, HIGH RISK OTHER: DETECTED
HPV, INTERPRETATION: ABNORMAL
SPECIMEN DESCRIPTION: ABNORMAL

## 2022-03-23 LAB
CHLAMYDIA BY THIN PREP: NEGATIVE
N. GONORRHOEAE DNA, THIN PREP: NEGATIVE
SPECIMEN DESCRIPTION: NORMAL

## 2022-03-25 LAB — CYTOLOGY REPORT: NORMAL

## 2022-03-28 ENCOUNTER — HOSPITAL ENCOUNTER (OUTPATIENT)
Dept: WOMENS IMAGING | Age: 47
Discharge: HOME | End: 2022-03-28
Payer: COMMERCIAL

## 2022-03-28 DIAGNOSIS — Z12.31 VISIT FOR SCREENING MAMMOGRAM: ICD-10-CM

## 2022-03-28 PROCEDURE — 77063 BREAST TOMOSYNTHESIS BI: CPT

## 2022-03-30 ENCOUNTER — HOSPITAL ENCOUNTER (OUTPATIENT)
Dept: WOMENS IMAGING | Age: 47
Discharge: HOME | End: 2022-03-30
Payer: COMMERCIAL

## 2022-03-30 DIAGNOSIS — R92.0 MICROCALCIFICATION OF LEFT BREAST ON MAMMOGRAM: ICD-10-CM

## 2022-03-30 PROCEDURE — G0279 TOMOSYNTHESIS, MAMMO: HCPCS

## 2022-06-08 ENCOUNTER — NURSE ONLY (OUTPATIENT)
Dept: LAB | Age: 47
End: 2022-06-08

## 2022-08-13 ENCOUNTER — HOSPITAL ENCOUNTER (EMERGENCY)
Age: 47
Discharge: HOME | End: 2022-08-13
Attending: EMERGENCY MEDICINE
Payer: COMMERCIAL

## 2022-08-13 VITALS
DIASTOLIC BLOOD PRESSURE: 77 MMHG | OXYGEN SATURATION: 98 % | RESPIRATION RATE: 16 BRPM | SYSTOLIC BLOOD PRESSURE: 122 MMHG | HEART RATE: 78 BPM | TEMPERATURE: 98.3 F

## 2022-08-13 DIAGNOSIS — L23.7 ALLERGIC CONTACT DERMATITIS DUE TO PLANT: Primary | ICD-10-CM

## 2022-08-13 PROCEDURE — 99213 OFFICE O/P EST LOW 20 MIN: CPT

## 2022-08-13 PROCEDURE — 99213 OFFICE O/P EST LOW 20 MIN: CPT | Performed by: EMERGENCY MEDICINE

## 2022-08-13 RX ORDER — PREDNISONE 20 MG/1
20 TABLET ORAL DAILY
Qty: 14 TABLET | Refills: 0 | Status: SHIPPED | OUTPATIENT
Start: 2022-08-13 | End: 2022-08-25

## 2022-08-13 RX ORDER — HYDROXYZINE 50 MG/1
50 TABLET, FILM COATED ORAL 4 TIMES DAILY PRN
Qty: 20 TABLET | Refills: 0 | Status: SHIPPED | OUTPATIENT
Start: 2022-08-13

## 2022-08-13 ASSESSMENT — ENCOUNTER SYMPTOMS
VOMITING: 0
SINUS PRESSURE: 0
SHORTNESS OF BREATH: 0
VOICE CHANGE: 0
WHEEZING: 0
ABDOMINAL PAIN: 0
DIARRHEA: 0
BLOOD IN STOOL: 0
CONSTIPATION: 0
EYE DISCHARGE: 0
STRIDOR: 0
EYE REDNESS: 0
BACK PAIN: 0
COUGH: 0
TROUBLE SWALLOWING: 0
CHOKING: 0
NAUSEA: 0
FACIAL SWELLING: 0
EYE PAIN: 0
SORE THROAT: 0

## 2022-08-13 NOTE — ED PROVIDER NOTES
[address]       Chief Complaint   Patient presents with    Rash       Nurses Notes reviewed and I agree except as noted in the HPI. HISTORY OF PRESENT ILLNESS   Pedro Pablo Lara is a 49 y.o. female who presents with itchy rash on face, neck and arms of 2 days duration. Exposed to clothing that had plant allergens. Using calamine and other over-the-counter remedies without improvement. No chest pain, shortness of breath, stridor, wheezing, fever, vomiting, painful or swollen glands. On metformin for elevated A1c. History of well-controlled asthma. Smokes cigarettes. REVIEW OF SYSTEMS     Review of Systems   Constitutional:  Negative for appetite change, chills, fatigue, fever and unexpected weight change. Nl appetite no fever   HENT:  Negative for congestion, ear discharge, ear pain, facial swelling, hearing loss, nosebleeds, postnasal drip, sinus pressure, sore throat, trouble swallowing and voice change. No upper respiratory symptoms   Eyes:  Negative for pain, discharge, redness and visual disturbance. No redness or drainage   Respiratory:  Negative for cough, choking, shortness of breath, wheezing and stridor. No cough or wheezing   Cardiovascular:  Negative for chest pain and leg swelling. No chest pain or syncope   Gastrointestinal:  Negative for abdominal pain, blood in stool, constipation, diarrhea, nausea and vomiting. No abdominal pain or vomiting   Genitourinary:  Negative for dysuria, flank pain, frequency, hematuria, urgency, vaginal bleeding and vaginal discharge. Musculoskeletal:  Negative for arthralgias, back pain, neck pain and neck stiffness. Skin:  Negative for rash. Itchy rash after plant allergen exposure   Neurological:  Negative for dizziness, seizures, syncope, weakness, light-headedness and headaches.         No headache or lethargy   Hematological:  Negative for adenopathy. Does not bruise/bleed easily. Psychiatric/Behavioral:  Negative for confusion, sleep disturbance and suicidal ideas. The patient is not nervous/anxious. Red and bold elements reviewed  PAST MEDICAL HISTORY         Diagnosis Date    Anxiety     Asthma     Bipolar 1 disorder (HCC)     COPD (chronic obstructive pulmonary disease) (HCC)     Depression     PTSD (post-traumatic stress disorder)        SURGICAL HISTORY     Patient  has a past surgical history that includes Tubal ligation. CURRENT MEDICATIONS       Discharge Medication List as of 8/13/2022  8:32 AM        CONTINUE these medications which have NOT CHANGED    Details   metFORMIN (GLUCOPHAGE) 500 MG tablet Take 1,000 mg by mouth nightlyHistorical Med      atorvastatin (LIPITOR) 10 MG tablet Take 10 mg by mouth dailyHistorical Med      meloxicam (MOBIC) 15 MG tablet Take 1 tablet by mouth daily, Disp-30 tablet, R-0Normal      tiZANidine (ZANAFLEX) 4 MG tablet Take 1 tablet by mouth 4 times daily as needed (back pain), Disp-40 tablet, R-0Normal      albuterol sulfate HFA (VENTOLIN HFA) 108 (90 Base) MCG/ACT inhaler Inhale 2 puffs into the lungs 4 times daily as needed for Wheezing, Disp-54 g, R-1Normal      fluticasone (FLOVENT HFA) 110 MCG/ACT inhaler Inhale 1 puff into the lungs 2 times daily, Disp-12 g, R-0Normal      albuterol (PROVENTIL) (2.5 MG/3ML) 0.083% nebulizer solution Take 3 mLs by nebulization every 6 hours as needed for Wheezing, Disp-120 each, R-0Normal             ALLERGIES     Patient is is allergic to blue dyes (parenteral) and penicillins. FAMILY HISTORY     Patient'sfamily history includes Asthma in her mother; COPD in her mother. SOCIAL HISTORY     Patient  reports that she has been smoking cigarettes. She has a 12.50 pack-year smoking history. She has never used smokeless tobacco. She reports that she does not drink alcohol and does not use drugs.     PHYSICAL EXAM     ED TRIAGE VITALS  BP: 122/77, Temp: 98.3 °F (36.8 °C), Heart Rate: 78, Resp: 16, SpO2: 98 %  Physical Exam  Vitals and nursing note reviewed. Constitutional:       General: She is not in acute distress. Appearance: She is well-developed. She is not ill-appearing. Comments: Moist membranes   HENT:      Head: Normocephalic and atraumatic. Right Ear: External ear normal.      Left Ear: External ear normal.      Nose: Nose normal.      Mouth/Throat:      Pharynx: No oropharyngeal exudate. Comments: Oropharynx normal  Eyes:      General: No scleral icterus. Right eye: No discharge. Left eye: No discharge. Extraocular Movements:      Right eye: Normal extraocular motion. Left eye: Normal extraocular motion. Conjunctiva/sclera: Conjunctivae normal.      Pupils: Pupils are equal, round, and reactive to light. Comments: Conjunctiva clear   Neck:      Thyroid: No thyromegaly. Vascular: No JVD. Comments: No Meningismus  Cardiovascular:      Rate and Rhythm: Normal rate and regular rhythm. Pulses: Normal pulses. Heart sounds: Normal heart sounds, S1 normal and S2 normal. No murmur heard. No friction rub. No gallop. Comments: No murmur  Pulmonary:      Effort: Pulmonary effort is normal. No tachypnea or respiratory distress. Breath sounds: Normal breath sounds. No stridor. No decreased breath sounds, wheezing, rhonchi or rales. Comments: No cough lungs clear  Chest:      Chest wall: No tenderness. Abdominal:      General: Bowel sounds are normal. There is no distension. Palpations: Abdomen is soft. There is no mass. Tenderness: There is no abdominal tenderness. There is no guarding or rebound. Musculoskeletal:         General: No tenderness. Normal range of motion. Cervical back: Normal range of motion. Comments: Joints normal   Lymphadenopathy:      Cervical: No cervical adenopathy. Right cervical: No superficial cervical adenopathy.      Left cervical: No superficial cervical adenopathy. Skin:     General: Skin is warm and dry. Findings: No erythema or rash. Comments:  Allergic contact dermatitis face neck and arms. No bacterial infection no infestation   Neurological:      Mental Status: She is alert and oriented to person, place, and time. Cranial Nerves: No cranial nerve deficit. Motor: No abnormal muscle tone. Coordination: Coordination normal.      Deep Tendon Reflexes: Reflexes are normal and symmetric. Reflexes normal.      Comments: Appropriate no focal   Psychiatric:         Behavior: Behavior normal.         Thought Content: Thought content normal.         Judgment: Judgment normal.       DIAGNOSTIC RESULTS   Labs: No results found for this visit on 08/13/22. IMAGING:  No orders to display     URGENT CARE COURSE:     Vitals:    08/13/22 0822   BP: 122/77   Pulse: 78   Resp: 16   Temp: 98.3 °F (36.8 °C)   TempSrc: Temporal   SpO2: 98%       Medications - No data to display  PROCEDURES:  None  FINALIMPRESSION      1. Allergic contact dermatitis due to plant        DISPOSITION/PLAN   DISPOSITION Decision To Discharge 08/13/2022 08:29:10 AM  Nontoxic, well-hydrated, normal airway. No anaphylactic or anaphylactoid reaction, angioedema, urticaria, SJS, TEN. No bacterial infection. No neurovascular complication. Patient will require systemic and topical corticosteroids and antihistamines. Will treat with prednisone, Valisone, Atarax, increased oral clear liquids, rest in cool air conditioned space. Patient to follow-up with PCP in 6 days for recheck, and she understands to go to ED if worse.   PATIENT REFERRED TO:  PANTERA Sue - CNP  [address]    In 6 days  Recheck if problems persist, go to emergency if worse  DISCHARGE MEDICATIONS:  Discharge Medication List as of 8/13/2022  8:32 AM        START taking these medications    Details   predniSONE (Derinda Dsetin) 20 MG tablet Take 1 tablet by mouth in the morning for 12 days. 2 p.o. daily for 4 days, 1 p.o. daily for 4 days, one half p.o. daily for 4 days. , Disp-14 tablet, R-0Print      betamethasone valerate (VALISONE) 0.1 % cream Apply topically 3 times daily.   Do not apply to face, Disp-45 g, R-0, Print      hydrOXYzine HCl (ATARAX) 50 MG tablet Take 1 tablet by mouth 4 times daily as needed for Itching Caution do not take at work will cause drowsiness, Disp-20 tablet, R-0Print           Discharge Medication List as of 8/13/2022  8:32 AM          MD Eric Pena MD  08/13/22 1015

## 2022-09-09 ENCOUNTER — OFFICE VISIT (OUTPATIENT)
Dept: BARIATRICS/WEIGHT MGMT | Age: 47
End: 2022-09-09
Payer: COMMERCIAL

## 2022-09-09 VITALS
HEART RATE: 84 BPM | SYSTOLIC BLOOD PRESSURE: 124 MMHG | BODY MASS INDEX: 50.02 KG/M2 | DIASTOLIC BLOOD PRESSURE: 86 MMHG | TEMPERATURE: 98.5 F | HEIGHT: 64 IN | WEIGHT: 293 LBS

## 2022-09-09 DIAGNOSIS — J45.909 UNCOMPLICATED ASTHMA, UNSPECIFIED ASTHMA SEVERITY, UNSPECIFIED WHETHER PERSISTENT: ICD-10-CM

## 2022-09-09 DIAGNOSIS — G45.9 TIA (TRANSIENT ISCHEMIC ATTACK): ICD-10-CM

## 2022-09-09 DIAGNOSIS — F31.5 BIPOLAR DISORDER, CURR EPISODE DEPRESSED, SEVERE, W/PSYCHOTIC FEATURES (HCC): ICD-10-CM

## 2022-09-09 DIAGNOSIS — E11.69 DIABETES MELLITUS TYPE 2 IN OBESE (HCC): ICD-10-CM

## 2022-09-09 DIAGNOSIS — F32.A DEPRESSION, UNSPECIFIED DEPRESSION TYPE: ICD-10-CM

## 2022-09-09 DIAGNOSIS — E66.9 DIABETES MELLITUS TYPE 2 IN OBESE (HCC): ICD-10-CM

## 2022-09-09 DIAGNOSIS — E66.01 MORBID OBESITY (HCC): Primary | ICD-10-CM

## 2022-09-09 DIAGNOSIS — F43.10 PTSD (POST-TRAUMATIC STRESS DISORDER): ICD-10-CM

## 2022-09-09 DIAGNOSIS — F41.9 ANXIETY: ICD-10-CM

## 2022-09-09 DIAGNOSIS — J44.9 CHRONIC OBSTRUCTIVE PULMONARY DISEASE, UNSPECIFIED COPD TYPE (HCC): ICD-10-CM

## 2022-09-09 PROCEDURE — 99203 OFFICE O/P NEW LOW 30 MIN: CPT | Performed by: SURGERY

## 2022-09-09 RX ORDER — PEN NEEDLE, DIABETIC 32GX 5/32"
NEEDLE, DISPOSABLE MISCELLANEOUS
COMMUNITY
Start: 2022-07-22

## 2022-09-09 RX ORDER — LIRAGLUTIDE 6 MG/ML
INJECTION SUBCUTANEOUS
COMMUNITY
Start: 2022-08-15

## 2022-09-10 ASSESSMENT — ENCOUNTER SYMPTOMS
ANAL BLEEDING: 0
NAUSEA: 0
DIARRHEA: 0
SORE THROAT: 0
APNEA: 0
EYE PAIN: 0
FACIAL SWELLING: 0
BACK PAIN: 1
TROUBLE SWALLOWING: 0
VOICE CHANGE: 0
CONSTIPATION: 0
CHEST TIGHTNESS: 0
SINUS PRESSURE: 0
SHORTNESS OF BREATH: 0
EYE REDNESS: 0
CHOKING: 0
RECTAL PAIN: 0
EYE DISCHARGE: 0
COLOR CHANGE: 0
VOMITING: 0
EYE ITCHING: 0
PHOTOPHOBIA: 0
ALLERGIC/IMMUNOLOGIC NEGATIVE: 1
BLOOD IN STOOL: 0
ABDOMINAL PAIN: 0
RHINORRHEA: 0
COUGH: 0
ABDOMINAL DISTENTION: 0
WHEEZING: 0
STRIDOR: 0

## 2022-09-10 NOTE — PROGRESS NOTES
Husam Grier (:  1975)     ASSESSMENT:  1.  Morbid obesity (BMI 50)  2. COPD  3. Asthma  4. Diabetes mellitus  5. TIA  6. Bipolar disorder  7. Anxiety  8. Depression  9. PTSD    PLAN:  1. Long discussion about the pros and cons of weight loss surgery. The risks benefits and alternatives to laparoscopic adjustable band, gastric sleeve and gastric Aishwarya-en-Y bypass were discussed in detail. The pros and cons of robotic assisted, laparoscopic and open techniques were discussed. 2.  Behavior modification discussed in detail in regards to dietary habits. 3.  Nutritional education occurred during visit. Will set up a consultation/evaluation with dietitian for further evaluation. 4.  Options for medical management of morbid obesity discussed. 5.  Improvement in fitness/exercise discussed with patient and the need for this with/without surgery. 6.  Obtain medical necessity letter from PCP as needed. 7.  Follow-up in one month at weight management program at [facility]. 8.  Signs and symptoms reviewed with patient that would be concerning and need her to return to office for re-evaluation. Patient states she will call if she has questions or concerns. 9. Multivitamin  10. Psychology evaluation  11. EGD prior to any surgical intervention  12. Encouraged support groups  13. Encouraged Naturally Slim/Rev It Up  14. Discussed the pros and cons along with possible side effects/complications of weight loss medications. All questions answered. Patient states that if she is not able to lose enough adequate excess body weight with medical management only then she would be like to proceed with surgical intervention such as sleeve gastrectomy/gastric bypass for further weight loss. More than 50 minutes spent with patient today. Greater than 50% of the time was involved counseling, educaton and coordinating care.     SUBJECTIVE/OBJECTIVE:    Chief Complaint   Patient presents with    Weight Loss     New pt. 6 months - desires sleeve     HPI  Teddy Jay is a 43-year-old female who presents for initial evaluation at the weight management program secondary to her morbid obesity. BMI 50. Current weight 296 pounds. Multiple significant comorbid conditions including diabetes mellitus. Denies tobacco abuse. Has 3 children ages 21, 32 and 28. Bang score 3-4. Admits she has been overweight most of her life. She states she has tried multiple times at improving her nutrition but unfortunately has never been able to lose a significant amount of weight and keep it off long-term. She admits her excess body weight affects her physically with lower extremity joint aching and lower back discomfort. Admits she is not able to do some things physically that she would like to do because of her excess body weight. She states that it not only affects her physically but also socially/mentally. Denies current chest or abdominal pain. No hematochezia or melena. No new urinary complaints. She states that if she is not able to lose enough adequate excess body weight with medical management only then she would like to potentially proceed with surgical intervention for further weight loss. Review of Systems   Constitutional:  Negative for activity change, appetite change, chills, diaphoresis, fatigue, fever and unexpected weight change. HENT:  Negative for congestion, dental problem, drooling, ear discharge, ear pain, facial swelling, hearing loss, mouth sores, nosebleeds, postnasal drip, rhinorrhea, sinus pressure, sneezing, sore throat, tinnitus, trouble swallowing and voice change. Eyes:  Negative for photophobia, pain, discharge, redness, itching and visual disturbance. Respiratory:  Negative for apnea, cough, choking, chest tightness, shortness of breath, wheezing and stridor. Cardiovascular:  Negative for chest pain, palpitations and leg swelling.    Gastrointestinal:  Negative for abdominal distention, abdominal pain, anal bleeding, blood in stool, constipation, diarrhea, nausea, rectal pain and vomiting. Endocrine: Negative. Genitourinary:  Negative for decreased urine volume, difficulty urinating, dyspareunia, dysuria, enuresis, flank pain, frequency, genital sores, hematuria, menstrual problem, pelvic pain, urgency, vaginal bleeding, vaginal discharge and vaginal pain. Musculoskeletal:  Positive for back pain. Negative for arthralgias, gait problem, joint swelling, myalgias, neck pain and neck stiffness. Skin:  Negative for color change, pallor, rash and wound. Allergic/Immunologic: Negative. Neurological:  Positive for weakness. Negative for dizziness, tremors, seizures, syncope, facial asymmetry, speech difficulty, light-headedness, numbness and headaches. Hematological:  Negative for adenopathy. Does not bruise/bleed easily. Psychiatric/Behavioral:  Positive for sleep disturbance. Negative for agitation, behavioral problems, confusion, decreased concentration, dysphoric mood, hallucinations, self-injury and suicidal ideas. The patient is not nervous/anxious and is not hyperactive.       Past Medical History:   Diagnosis Date    Anxiety     Asthma     Bipolar 1 disorder ([facility] [address]     COPD (chronic obstructive pulmonary disease) ([facility])     Depression     PTSD (post-traumatic stress disorder)        Past Surgical History:   Procedure Laterality Date    TUBAL LIGATION         Current Outpatient Medications   Medication Sig Dispense Refill    VICTOZA 18 MG/3ML SOPN SC injection INJECT 0.6 MG INTO THE SKIN ONCE DAILY      Liraglutide (VICTOZA) 18 MG/3ML SOPN SC injection Inject into the skin      atorvastatin (LIPITOR) 10 MG tablet Take 10 mg by mouth daily      albuterol sulfate HFA (VENTOLIN HFA) 108 (90 Base) MCG/ACT inhaler Inhale 2 puffs into the lungs 4 times daily as needed for Wheezing 54 g 1    albuterol (PROVENTIL) (2.5 MG/3ML) 0.083% nebulizer solution Take 3 mLs by nebulization every 6 hours as needed for Wheezing 120 each 0    UNIFINE PENTIPS 32G X 4 MM MISC USE TO INJECT victoza UNDER THE SKIN ONCE A DAY      hydrOXYzine HCl (ATARAX) 50 MG tablet Take 1 tablet by mouth 4 times daily as needed for Itching Caution do not take at work will cause drowsiness (Patient not taking: Reported on 2022) 20 tablet 0    metFORMIN (GLUCOPHAGE) 500 MG tablet Take 1,000 mg by mouth nightly (Patient not taking: Reported on 2022)      meloxicam (MOBIC) 15 MG tablet Take 1 tablet by mouth daily (Patient not taking: Reported on 2022) 30 tablet 0    tiZANidine (ZANAFLEX) 4 MG tablet Take 1 tablet by mouth 4 times daily as needed (back pain) (Patient not taking: Reported on 2022) 40 tablet 0    fluticasone (FLOVENT HFA) 110 MCG/ACT inhaler Inhale 1 puff into the lungs 2 times daily (Patient not taking: Reported on 2022) 12 g 0     No current facility-administered medications for this visit.        Allergies   Allergen Reactions    Blue Dyes (Parenteral) Anaphylaxis    Penicillins Nausea And Vomiting       Family History   Problem Relation Age of Onset    Asthma Mother     COPD Mother     Diabetes Paternal Grandmother     Cancer Maternal Grandmother        Social History     Socioeconomic History    Marital status: Single     Spouse name: Not on file    Number of children: Not on file    Years of education: Not on file    Highest education level: Not on file   Occupational History    Not on file   Tobacco Use    Smoking status: Former     Packs/day: 0.50     Years: 25.00     Pack years: 12.50     Types: Cigarettes     Quit date: 2022     Years since quittin.0    Smokeless tobacco: Never   Vaping Use    Vaping Use: Never used   Substance and Sexual Activity    Alcohol use: No     Comment: occasionally    Drug use: No     Comment: clean 30 days marijuana and cocaine 2020    Sexual activity: Yes     Partners: Male     Comment: x 5 years   Other Topics Concern    Not on file   Social History Narrative    Not on file     Social Determinants of Health     Financial Resource Strain: Not on file   Food Insecurity: Not on file   Transportation Needs: Not on file   Physical Activity: Not on file   Stress: Not on file   Social Connections: Not on file   Intimate Partner Violence: Not on file   Housing Stability: Not on file     Vitals:    09/09/22 0906   BP: 124/86   Site: Right Upper Arm   Position: Sitting   Cuff Size: Large Adult   Pulse: 84   Temp: 98.5 °F (36.9 °C)   TempSrc: Oral   Weight: 296 lb 9.6 oz (134.5 kg)   Height: 5' 4\" (1.626 m)     Body mass index is 50.91 kg/m². Wt Readings from Last 3 Encounters:   09/09/22 296 lb 9.6 oz (134.5 kg)   02/05/22 275 lb (124.7 kg)   11/24/21 75 lb (34 kg)     Physical Exam  Vitals reviewed. Constitutional:       General: She is not in acute distress. Appearance: She is well-developed. She is not diaphoretic. HENT:      Head: Normocephalic and atraumatic. Right Ear: External ear normal.      Left Ear: External ear normal.      Nose: Nose normal.   Eyes:      General: No scleral icterus. Right eye: No discharge. Left eye: No discharge. Conjunctiva/sclera: Conjunctivae normal.   Cardiovascular:      Rate and Rhythm: Normal rate and regular rhythm. Heart sounds: Normal heart sounds. Pulmonary:      Effort: Pulmonary effort is normal. No respiratory distress. Breath sounds: Normal breath sounds. No wheezing or rales. Chest:      Chest wall: No tenderness. Abdominal:      General: Bowel sounds are normal. There is no distension. Palpations: Abdomen is soft. There is no mass. Tenderness: There is no abdominal tenderness. There is no guarding or rebound. Musculoskeletal:         General: No tenderness. Normal range of motion. Cervical back: Normal range of motion and neck supple. Skin:     General: Skin is warm and dry. Coloration: Skin is not pale.       Findings: No erythema or rash. Neurological:      Mental Status: She is alert and oriented to person, place, and time. Cranial Nerves: No cranial nerve deficit. Psychiatric:         Behavior: Behavior normal.         Thought Content: Thought content normal.         Judgment: Judgment normal.     Lab Results   Component Value Date    WBC 11.6 (H) 02/16/2022    HGB 14.6 02/16/2022    HCT 44.5 02/16/2022    MCV 93.9 02/16/2022     02/16/2022     Lab Results   Component Value Date     03/21/2022    K 4.8 03/21/2022     03/21/2022    CO2 21 03/21/2022     Lab Results   Component Value Date    CREATININE 0.74 03/21/2022     Lab Results   Component Value Date    ALT 29 03/21/2022    AST 14 03/21/2022    ALKPHOS 89 03/21/2022    BILITOT 0.36 03/21/2022     No results found for: LIPASE    Patient Active Problem List   Diagnosis    Drug psychosis, with unspecified complication (HCC)    Weakness of right side of body    Bipolar disorder, curr episode depressed, severe, w/psychotic features ([facility] [address]       An electronic signature was used to authenticate this note.     --Kian Zaragoza MD

## 2022-09-12 DIAGNOSIS — Z91.89 AT RISK FOR OBSTRUCTIVE SLEEP APNEA: Primary | ICD-10-CM

## 2022-10-26 ENCOUNTER — HOSPITAL ENCOUNTER (EMERGENCY)
Age: 47
Discharge: HOME | End: 2022-10-26
Payer: COMMERCIAL

## 2022-10-26 VITALS
RESPIRATION RATE: 20 BRPM | WEIGHT: 293 LBS | BODY MASS INDEX: 51.49 KG/M2 | TEMPERATURE: 97.8 F | SYSTOLIC BLOOD PRESSURE: 132 MMHG | OXYGEN SATURATION: 99 % | DIASTOLIC BLOOD PRESSURE: 90 MMHG | HEART RATE: 97 BPM

## 2022-10-26 DIAGNOSIS — J44.1 COPD EXACERBATION (HCC): Primary | ICD-10-CM

## 2022-10-26 PROCEDURE — 99213 OFFICE O/P EST LOW 20 MIN: CPT

## 2022-10-26 PROCEDURE — 99213 OFFICE O/P EST LOW 20 MIN: CPT | Performed by: NURSE PRACTITIONER

## 2022-10-26 RX ORDER — ALBUTEROL SULFATE 90 UG/1
2 AEROSOL, METERED RESPIRATORY (INHALATION) EVERY 4 HOURS PRN
Qty: 18 G | Refills: 0 | Status: SHIPPED | OUTPATIENT
Start: 2022-10-26

## 2022-10-26 RX ORDER — DOXYCYCLINE HYCLATE 100 MG
100 TABLET ORAL 2 TIMES DAILY
Qty: 20 TABLET | Refills: 0 | Status: SHIPPED | OUTPATIENT
Start: 2022-10-26 | End: 2022-11-05

## 2022-10-26 RX ORDER — PREDNISONE 20 MG/1
20 TABLET ORAL DAILY
Qty: 5 TABLET | Refills: 0 | Status: SHIPPED | OUTPATIENT
Start: 2022-10-26 | End: 2022-10-31

## 2022-10-26 RX ORDER — ALBUTEROL SULFATE 2.5 MG/3ML
2.5 SOLUTION RESPIRATORY (INHALATION) EVERY 6 HOURS PRN
Qty: 120 EACH | Refills: 3 | Status: SHIPPED | OUTPATIENT
Start: 2022-10-26

## 2022-10-26 ASSESSMENT — ENCOUNTER SYMPTOMS
SINUS PAIN: 0
TROUBLE SWALLOWING: 0
RHINORRHEA: 1
NAUSEA: 0
CHEST TIGHTNESS: 1
WHEEZING: 1
DIARRHEA: 0
SINUS PRESSURE: 0
SORE THROAT: 0
EYE DISCHARGE: 0
COUGH: 1
VOMITING: 0
EYE REDNESS: 0
SHORTNESS OF BREATH: 1

## 2022-10-26 ASSESSMENT — PAIN DESCRIPTION - DESCRIPTORS: DESCRIPTORS: BURNING;SHARP

## 2022-10-26 ASSESSMENT — PAIN - FUNCTIONAL ASSESSMENT
PAIN_FUNCTIONAL_ASSESSMENT: PREVENTS OR INTERFERES SOME ACTIVE ACTIVITIES AND ADLS
PAIN_FUNCTIONAL_ASSESSMENT: 0-10

## 2022-10-26 ASSESSMENT — PAIN DESCRIPTION - PAIN TYPE: TYPE: ACUTE PAIN

## 2022-10-26 ASSESSMENT — PAIN DESCRIPTION - FREQUENCY: FREQUENCY: CONTINUOUS

## 2022-10-26 ASSESSMENT — PAIN DESCRIPTION - LOCATION: LOCATION: CHEST;RIB CAGE

## 2022-10-26 ASSESSMENT — PAIN SCALES - GENERAL: PAINLEVEL_OUTOF10: 10

## 2022-10-26 NOTE — Clinical Note
Marisela Ruiz was seen and treated in our emergency department on 10/26/2022. She may return to work on 10/27/2022. If you have any questions or concerns, please don't hesitate to call.       Diego Daley, PANTERA - CNP

## 2022-10-26 NOTE — ED PROVIDER NOTES
[place] [address]  Urgent Care Encounter      CHIEF COMPLAINT       Chief Complaint   Patient presents with    Shortness of Breath    Cough     Hurting my chest and rib cage     Fatigue    Other     \"I got my flu shot and Covid booster at the same time on Monday\"       Nurses Notes reviewed and I agree except as noted in the HPI. HISTORY OF PRESENT ILLNESS   Faisal Eaton is a 49 y.o. female who presents for evaluation of cough. Onset of symptoms Monday, worsening. Cough is intermittent, productive at times. Associated shortness of breath, chest tightness, fatigue. Patient also notes painful deep breathing/\"lung pain\" with coughing. No chest pain. No palpitations. History of asthma/COPD. No travel. No known exposure to COVID, strep, flu. Patient did receive both her COVID/influenza vaccine on Monday. No improvement with current treatment. Requesting refill for albuterol inhaler/nebulizer. Patient also needs a work excuse. REVIEW OF SYSTEMS     Review of Systems   Constitutional:  Positive for fatigue. Negative for chills, diaphoresis and fever. HENT:  Positive for congestion, postnasal drip and rhinorrhea. Negative for ear pain, sinus pressure, sinus pain, sore throat and trouble swallowing. Eyes:  Negative for discharge and redness. Respiratory:  Positive for cough, chest tightness, shortness of breath and wheezing. Cardiovascular:  Negative for chest pain. Gastrointestinal:  Negative for diarrhea, nausea and vomiting. Genitourinary:  Negative for decreased urine volume. Musculoskeletal:  Positive for myalgias. Negative for neck pain and neck stiffness. Skin:  Negative for rash. Neurological:  Positive for headaches. Negative for dizziness. Hematological:  Negative for adenopathy. Psychiatric/Behavioral:  Negative for sleep disturbance.       PAST MEDICAL HISTORY         Diagnosis Date    Anxiety     Asthma     Bipolar 1 disorder ([facility] [address]     COPD (chronic obstructive pulmonary disease) (HCC)     Depression     PTSD (post-traumatic stress disorder)        SURGICAL HISTORY     Patient  has a past surgical history that includes Tubal ligation. CURRENT MEDICATIONS       Previous Medications    ALBUTEROL (PROVENTIL) (2.5 MG/3ML) 0.083% NEBULIZER SOLUTION    Take 3 mLs by nebulization every 6 hours as needed for Wheezing    ALBUTEROL SULFATE HFA (VENTOLIN HFA) 108 (90 BASE) MCG/ACT INHALER    Inhale 2 puffs into the lungs 4 times daily as needed for Wheezing    ATORVASTATIN (LIPITOR) 10 MG TABLET    Take 10 mg by mouth daily    FLUTICASONE (FLOVENT HFA) 110 MCG/ACT INHALER    Inhale 1 puff into the lungs 2 times daily    HYDROXYZINE HCL (ATARAX) 50 MG TABLET    Take 1 tablet by mouth 4 times daily as needed for Itching Caution do not take at work will cause drowsiness    LIRAGLUTIDE (VICTOZA) 18 MG/3ML SOPN SC INJECTION    Inject into the skin    MELOXICAM (MOBIC) 15 MG TABLET    Take 1 tablet by mouth daily    METFORMIN (GLUCOPHAGE) 500 MG TABLET    Take 1,000 mg by mouth nightly    TIZANIDINE (ZANAFLEX) 4 MG TABLET    Take 1 tablet by mouth 4 times daily as needed (back pain)    UNIFINE PENTIPS 32G X 4 MM MISC    USE TO INJECT victoza UNDER THE SKIN ONCE A DAY    VICTOZA 18 MG/3ML SOPN SC INJECTION    INJECT 0.6 MG INTO THE SKIN ONCE DAILY       ALLERGIES     Patient is is allergic to blue dyes (parenteral) and penicillins. FAMILY HISTORY     Patient'sfamily history includes Asthma in her mother; COPD in her mother; Cancer in her maternal grandmother; Diabetes in her paternal grandmother. SOCIAL HISTORY     Patient  reports that she quit smoking about 7 weeks ago. Her smoking use included cigarettes. She has a 12.50 pack-year smoking history. She has never used smokeless tobacco. She reports that she does not drink alcohol and does not use drugs.     PHYSICAL EXAM     ED TRIAGE VITALS  BP: (!) 132/90, Temp: 97.8 °F (36.6 °C),  , Resp: 20, SpO2: 99 %  Physical Exam  Vitals and nursing note reviewed. Constitutional:       General: She is not in acute distress. Appearance: Normal appearance. She is well-developed. She is not ill-appearing, toxic-appearing or diaphoretic. HENT:      Head: Normocephalic and atraumatic. Right Ear: Hearing, tympanic membrane, ear canal and external ear normal. No mastoid tenderness. No hemotympanum. Tympanic membrane is not perforated, erythematous or bulging. Left Ear: Hearing, tympanic membrane, ear canal and external ear normal. No mastoid tenderness. No hemotympanum. Tympanic membrane is not perforated, erythematous or bulging. Nose: Nose normal.      Mouth/Throat:      Mouth: Mucous membranes are moist.      Pharynx: Oropharynx is clear. Uvula midline. Tonsils: No tonsillar abscesses. Eyes:      General: No scleral icterus. Conjunctiva/sclera: Conjunctivae normal.      Right eye: Right conjunctiva is not injected. No hemorrhage. Left eye: Left conjunctiva is not injected. No hemorrhage. Neck:      Thyroid: No thyromegaly. Trachea: Trachea normal.   Cardiovascular:      Rate and Rhythm: Normal rate and regular rhythm. No extrasystoles are present. Chest Wall: PMI is not displaced. Heart sounds: Normal heart sounds. No murmur heard. No friction rub. No gallop. Pulmonary:      Effort: Pulmonary effort is normal. No respiratory distress. Breath sounds: Wheezing (Diffuse, expiratory) present. Musculoskeletal:      Cervical back: Normal range of motion and neck supple. Lymphadenopathy:      Head:      Right side of head: No submental, submandibular, tonsillar or occipital adenopathy. Left side of head: No submental, submandibular, tonsillar or occipital adenopathy. Cervical: No cervical adenopathy. Upper Body:      Right upper body: No supraclavicular adenopathy. Left upper body: No supraclavicular adenopathy.    Skin:     General: Skin is warm and dry.      Capillary Refill: Capillary refill takes less than 2 seconds. Coloration: Skin is not pale. Findings: No rash. Comments: Skin warm and dry to touch, no rashes noted on exposed surfaces. Neurological:      Mental Status: She is alert and oriented to person, place, and time. She is not disoriented. Psychiatric:         Mood and Affect: Mood normal.         Behavior: Behavior is cooperative. DIAGNOSTIC RESULTS   Labs:No results found for this visit on 10/26/22. IMAGING:  No orders to display      URGENT CARE COURSE:     Vitals:    10/26/22 0816   BP: (!) 132/90   Resp: 20   Temp: 97.8 °F (36.6 °C)   TempSrc: Temporal   SpO2: 99%   Weight: 300 lb (136.1 kg)       Medications - No data to display  PROCEDURES:  None  FINAL IMPRESSION      1. COPD exacerbation (HCC)        DISPOSITION/PLAN   DISPOSITION Decision To Discharge 10/26/2022 08:29:03 AM    Nontoxic, no distress. Exam consistent with a COPD exacerbation. Medication as prescribed. Off of work today. If any distress go to ER. PATIENT REFERRED TO:  PANTERA Vega CNP  [address]      Nontoxic, no distress. Exam consistent with acute COPD exacerbation. Medications as prescribed. Monitor blood glucose. If any distress go to ER.     DISCHARGE MEDICATIONS:  New Prescriptions    ALBUTEROL (PROVENTIL) (2.5 MG/3ML) 0.083% NEBULIZER SOLUTION    Take 3 mLs by nebulization every 6 hours as needed for Wheezing    ALBUTEROL SULFATE HFA (VENTOLIN HFA) 108 (90 BASE) MCG/ACT INHALER    Inhale 2 puffs into the lungs every 4 hours as needed for Wheezing or Shortness of Breath    DOXYCYCLINE HYCLATE (VIBRA-TABS) 100 MG TABLET    Take 1 tablet by mouth 2 times daily for 10 days    PREDNISONE (DELTASONE) 20 MG TABLET    Take 1 tablet by mouth daily for 5 days     Current Discharge Medication List          Nilay Donahue, [address] PANTERA Abdalla CNP  10/26/22 1997

## 2022-10-26 NOTE — ED TRIAGE NOTES
Pt to room 2 with c/o weakness, SOB and a harsh cough that started shortly after she received her Flu shot and Covid booster at the same time on Monday.

## 2022-11-01 ENCOUNTER — HOSPITAL ENCOUNTER (EMERGENCY)
Age: 47
Discharge: HOME | End: 2022-11-01
Attending: EMERGENCY MEDICINE
Payer: COMMERCIAL

## 2022-11-01 ENCOUNTER — APPOINTMENT (OUTPATIENT)
Dept: GENERAL RADIOLOGY | Age: 47
End: 2022-11-01
Payer: COMMERCIAL

## 2022-11-01 VITALS
SYSTOLIC BLOOD PRESSURE: 125 MMHG | TEMPERATURE: 97.6 F | HEIGHT: 61 IN | OXYGEN SATURATION: 98 % | RESPIRATION RATE: 13 BRPM | BODY MASS INDEX: 55.32 KG/M2 | WEIGHT: 293 LBS | HEART RATE: 71 BPM | DIASTOLIC BLOOD PRESSURE: 78 MMHG

## 2022-11-01 DIAGNOSIS — R53.1 GENERAL WEAKNESS: Primary | ICD-10-CM

## 2022-11-01 DIAGNOSIS — K52.1 DRUG-INDUCED DIARRHEA: ICD-10-CM

## 2022-11-01 LAB
ANION GAP SERPL CALCULATED.3IONS-SCNC: 13 MEQ/L (ref 8–16)
BASOPHILS # BLD: 0.4 %
BASOPHILS ABSOLUTE: 0 THOU/MM3 (ref 0–0.1)
BILIRUBIN URINE: NEGATIVE
BLOOD, URINE: NEGATIVE
BUN BLDV-MCNC: 17 MG/DL (ref 7–22)
CALCIUM SERPL-MCNC: 9.7 MG/DL (ref 8.5–10.5)
CHARACTER, URINE: CLEAR
CHLORIDE BLD-SCNC: 101 MEQ/L (ref 98–111)
CO2: 24 MEQ/L (ref 23–33)
COLOR: YELLOW
CREAT SERPL-MCNC: 0.7 MG/DL (ref 0.4–1.2)
EKG ATRIAL RATE: 76 BPM
EKG P AXIS: 62 DEGREES
EKG P-R INTERVAL: 168 MS
EKG Q-T INTERVAL: 394 MS
EKG QRS DURATION: 80 MS
EKG QTC CALCULATION (BAZETT): 443 MS
EKG R AXIS: -5 DEGREES
EKG T AXIS: 40 DEGREES
EKG VENTRICULAR RATE: 76 BPM
EOSINOPHIL # BLD: 0.8 %
EOSINOPHILS ABSOLUTE: 0.1 THOU/MM3 (ref 0–0.4)
ERYTHROCYTE [DISTWIDTH] IN BLOOD BY AUTOMATED COUNT: 13.2 % (ref 11.5–14.5)
ERYTHROCYTE [DISTWIDTH] IN BLOOD BY AUTOMATED COUNT: 43.6 FL (ref 35–45)
GFR SERPL CREATININE-BSD FRML MDRD: > 60 ML/MIN/1.73M2
GLUCOSE BLD-MCNC: 79 MG/DL (ref 70–108)
GLUCOSE URINE: NEGATIVE MG/DL
HCT VFR BLD CALC: 44.7 % (ref 37–47)
HEMOGLOBIN: 15.4 GM/DL (ref 12–16)
IMMATURE GRANS (ABS): 0.03 THOU/MM3 (ref 0–0.07)
IMMATURE GRANULOCYTES: 0.3 %
INFLUENZA A: NOT DETECTED
INFLUENZA B: NOT DETECTED
KETONES, URINE: NEGATIVE
LEUKOCYTE ESTERASE, URINE: NEGATIVE
LYMPHOCYTES # BLD: 35.1 %
LYMPHOCYTES ABSOLUTE: 3.5 THOU/MM3 (ref 1–4.8)
MCH RBC QN AUTO: 31.3 PG (ref 26–33)
MCHC RBC AUTO-ENTMCNC: 34.5 GM/DL (ref 32.2–35.5)
MCV RBC AUTO: 90.9 FL (ref 81–99)
MONOCYTES # BLD: 7.9 %
MONOCYTES ABSOLUTE: 0.8 THOU/MM3 (ref 0.4–1.3)
NITRITE, URINE: NEGATIVE
NUCLEATED RED BLOOD CELLS: 0 /100 WBC
OSMOLALITY CALCULATION: 276.1 MOSMOL/KG (ref 275–300)
PH UA: 6.5 (ref 5–9)
PLATELET # BLD: 275 THOU/MM3 (ref 130–400)
PMV BLD AUTO: 9.6 FL (ref 9.4–12.4)
POTASSIUM REFLEX MAGNESIUM: 4.6 MEQ/L (ref 3.5–5.2)
PRO-BNP: 14.5 PG/ML (ref 0–450)
PROTEIN UA: NEGATIVE
RBC # BLD: 4.92 MILL/MM3 (ref 4.2–5.4)
SARS-COV-2 RNA, RT PCR: NOT DETECTED
SEG NEUTROPHILS: 55.5 %
SEGMENTED NEUTROPHILS ABSOLUTE COUNT: 5.5 THOU/MM3 (ref 1.8–7.7)
SODIUM BLD-SCNC: 138 MEQ/L (ref 135–145)
SPECIFIC GRAVITY, URINE: 1.02 (ref 1–1.03)
TROPONIN T: < 0.01 NG/ML
UROBILINOGEN, URINE: 0.2 EU/DL (ref 0–1)
WBC # BLD: 9.9 THOU/MM3 (ref 4.8–10.8)

## 2022-11-01 PROCEDURE — 99285 EMERGENCY DEPT VISIT HI MDM: CPT

## 2022-11-01 PROCEDURE — 93005 ELECTROCARDIOGRAM TRACING: CPT | Performed by: EMERGENCY MEDICINE

## 2022-11-01 PROCEDURE — 94640 AIRWAY INHALATION TREATMENT: CPT

## 2022-11-01 PROCEDURE — 71045 X-RAY EXAM CHEST 1 VIEW: CPT

## 2022-11-01 PROCEDURE — 6370000000 HC RX 637 (ALT 250 FOR IP)

## 2022-11-01 PROCEDURE — 36415 COLL VENOUS BLD VENIPUNCTURE: CPT

## 2022-11-01 PROCEDURE — 81003 URINALYSIS AUTO W/O SCOPE: CPT

## 2022-11-01 PROCEDURE — 84484 ASSAY OF TROPONIN QUANT: CPT

## 2022-11-01 PROCEDURE — 83880 ASSAY OF NATRIURETIC PEPTIDE: CPT

## 2022-11-01 PROCEDURE — 87636 SARSCOV2 & INF A&B AMP PRB: CPT

## 2022-11-01 PROCEDURE — 80048 BASIC METABOLIC PNL TOTAL CA: CPT

## 2022-11-01 PROCEDURE — 85025 COMPLETE CBC W/AUTO DIFF WBC: CPT

## 2022-11-01 PROCEDURE — 93010 ELECTROCARDIOGRAM REPORT: CPT | Performed by: INTERNAL MEDICINE

## 2022-11-01 RX ORDER — MECLIZINE HCL 25MG 25 MG/1
25 TABLET, CHEWABLE ORAL ONCE
Status: COMPLETED | OUTPATIENT
Start: 2022-11-01 | End: 2022-11-01

## 2022-11-01 RX ORDER — IPRATROPIUM BROMIDE AND ALBUTEROL SULFATE 2.5; .5 MG/3ML; MG/3ML
1 SOLUTION RESPIRATORY (INHALATION) ONCE
Status: COMPLETED | OUTPATIENT
Start: 2022-11-01 | End: 2022-11-01

## 2022-11-01 RX ADMIN — MECLIZINE HYDROCHLORIDE 25 MG: 25 TABLET, CHEWABLE ORAL at 11:22

## 2022-11-01 RX ADMIN — IPRATROPIUM BROMIDE AND ALBUTEROL SULFATE 1 AMPULE: .5; 3 SOLUTION RESPIRATORY (INHALATION) at 13:00

## 2022-11-01 ASSESSMENT — PAIN - FUNCTIONAL ASSESSMENT
PAIN_FUNCTIONAL_ASSESSMENT: NONE - DENIES PAIN
PAIN_FUNCTIONAL_ASSESSMENT: NONE - DENIES PAIN

## 2022-11-01 NOTE — LETTER
[address] [place] [address] [address] [address] EMERGENCY DEPT  [address] [place] [address]  Phone: [phone]               November 1, 2022    Patient: Cachorro Wong   YOB: 1975   Date of Visit: 11/1/2022       To Whom It May Concern:    Dary Bass was seen and treated in our emergency department on 11/1/2022. She may return to work on 11/02/2022.       Sincerely,       Vic Hackett RN      Signature:__________________________________

## 2022-11-01 NOTE — ED PROVIDER NOTES

## 2022-11-01 NOTE — ED PROVIDER NOTES
[facility] ENCOUNTER          Pt Name: Sachin Gray  MRN: [id number]  [place] 1975  Date of evaluation: 11/1/2022  Treating Resident Physician: Ivana Runner, MD  Supervising Physician: Vitaly Blanco MD    History obtained from the patient. CHIEF COMPLAINT       Chief Complaint   Patient presents with    Fatigue           HISTORY OF PRESENT ILLNESS    This is a 54-year-old female who presented to [address] [place] [address] on 11/01/2022. She is presenting with a 1 week history of generalized weakness and dizziness as well as a 5-day history of diarrhea. She states that 10/24/2022 she received her COVID-19 booster as well as her flu shot. With regards to her diarrhea, she has had 6-7 stools per day which she describes as very dark or yellow. She denies any hematochezia, melena, or strong odor with stool. She denies any episodes of incontinence. Accompanying her generalized weakness is also lightheadedness, chills, as well as periodic episodes of feeling \"hot\", as well as a sensation of the room spinning which worsens with movement. Review of systems was negative for chest pain, fever, nausea, vomiting, change in urination, dysuria, blurring of vision, headaches, postnasal drip, swelling in the arms or legs. Recent medical history significant for cough with greenish sputum production for which patient presented to an urgent care on 10/26/2022 and was prescribed antibiotics with twice daily dosing for 10 days as well as a 5-day course of steroids. Recent medication history significant for an increased dosage of her Victoza. Social history is significant for tobacco use. Patient states that she quit smoking 3 to 4 days ago was previously smoking 1 pack every 2 days for the last 30 years. She denies using smokeless tobacco, vapes, marijuana, alcohol, or recreational drugs.   She states that she has been clean and sober for the last 9 months with a past use history of crack cocaine. She denies any sick contacts, recent travel, animal/insect bites, time spent in wooded areas. She works at [employer] and denies any occupational exposures, recent hospitalizations, traumas, recent surgeries. She is sexually active with one longstanding partner. She endorses a history of HPV but denies any recent STIs or STDs. Past surgical history is significant for tubal ligation at the age of 33. She denies any additional acute symptoms or concerns. The history is provided by the patient. REVIEW OF SYSTEMS   12 point review of systems negative unless otherwise specified in HPI      PAST MEDICAL AND SURGICAL HISTORY     Past Medical History:   Diagnosis Date    Anxiety     Asthma     Bipolar 1 disorder ([facility] [address]     COPD (chronic obstructive pulmonary disease) ([facility])     Depression     PTSD (post-traumatic stress disorder)      Past Surgical History:   Procedure Laterality Date    TUBAL LIGATION           MEDICATIONS   No current facility-administered medications for this encounter.     Current Outpatient Medications:     albuterol sulfate HFA (VENTOLIN HFA) 108 (90 Base) MCG/ACT inhaler, Inhale 2 puffs into the lungs every 4 hours as needed for Wheezing or Shortness of Breath, Disp: 18 g, Rfl: 0    albuterol (PROVENTIL) (2.5 MG/3ML) 0.083% nebulizer solution, Take 3 mLs by nebulization every 6 hours as needed for Wheezing, Disp: 120 each, Rfl: 3    doxycycline hyclate (VIBRA-TABS) 100 MG tablet, Take 1 tablet by mouth 2 times daily for 10 days, Disp: 20 tablet, Rfl: 0    VICTOZA 18 MG/3ML SOPN SC injection, INJECT 0.6 MG INTO THE SKIN ONCE DAILY, Disp: , Rfl:     Liraglutide (VICTOZA) 18 MG/3ML SOPN SC injection, Inject into the skin, Disp: , Rfl:     UNIFINE PENTIPS 32G X 4 MM MISC, USE TO INJECT victoza UNDER THE SKIN ONCE A DAY, Disp: , Rfl:     hydrOXYzine HCl (ATARAX) 50 MG tablet, Take 1 tablet by mouth 4 times daily as needed for Itching Caution do not take at work will cause drowsiness (Patient not taking: No sig reported), Disp: 20 tablet, Rfl: 0    metFORMIN (GLUCOPHAGE) 500 MG tablet, Take 1,000 mg by mouth nightly (Patient not taking: Reported on 2022), Disp: , Rfl:     atorvastatin (LIPITOR) 10 MG tablet, Take 10 mg by mouth daily, Disp: , Rfl:     meloxicam (MOBIC) 15 MG tablet, Take 1 tablet by mouth daily (Patient not taking: No sig reported), Disp: 30 tablet, Rfl: 0    tiZANidine (ZANAFLEX) 4 MG tablet, Take 1 tablet by mouth 4 times daily as needed (back pain) (Patient not taking: No sig reported), Disp: 40 tablet, Rfl: 0    albuterol sulfate HFA (VENTOLIN HFA) 108 (90 Base) MCG/ACT inhaler, Inhale 2 puffs into the lungs 4 times daily as needed for Wheezing, Disp: 54 g, Rfl: 1    fluticasone (FLOVENT HFA) 110 MCG/ACT inhaler, Inhale 1 puff into the lungs 2 times daily (Patient not taking: No sig reported), Disp: 12 g, Rfl: 0    albuterol (PROVENTIL) (2.5 MG/3ML) 0.083% nebulizer solution, Take 3 mLs by nebulization every 6 hours as needed for Wheezing, Disp: 120 each, Rfl: 0      SOCIAL HISTORY     Social History     Social History Narrative    Not on file     Social History     Tobacco Use    Smoking status: Former     Packs/day: 0.50     Years: 25.00     Pack years: 12.50     Types: Cigarettes     Quit date: 2022     Years since quittin.1    Smokeless tobacco: Never   Vaping Use    Vaping Use: Never used   Substance Use Topics    Alcohol use: No     Comment: occasionally    Drug use: No     Comment: clean 30 days marijuana and cocaine 2020         ALLERGIES     Allergies   Allergen Reactions    Blue Dyes (Parenteral) Anaphylaxis    Penicillins Nausea And Vomiting         FAMILY HISTORY     Family History   Problem Relation Age of Onset    Asthma Mother     COPD Mother     Diabetes Paternal Grandmother     Cancer Maternal Grandmother          PREVIOUS RECORDS   Previous records reviewed:   10/26/2022 encounter for COPD exacerbation for which the patient was provided with albuterol nebulizer with rescue albuterol, doxycycline, and prednisone. PHYSICAL EXAM     ED Triage Vitals [11/01/22 0930]   BP Temp Temp Source Heart Rate Resp SpO2 Height Weight   131/86 97.6 °F (36.4 °C) Oral 71 20 97 % 5' 1\" (1.549 m) 300 lb (136.1 kg)     Initial vital signs and nursing assessment reviewed and normal. Body mass index is 56.68 kg/m². Pulsoximetry is normal per my interpretation. Additional Vital Signs:  Vitals:    11/01/22 1307   BP: 125/78   Pulse: 71   Resp: 13   Temp:    SpO2: 98%       Physical Exam  Constitutional:       Appearance: Normal appearance. She is obese. HENT:      Head: Normocephalic and atraumatic. Nose: Nose normal.      Mouth/Throat:      Mouth: Mucous membranes are moist.   Eyes:      Extraocular Movements: Extraocular movements intact. Pupils: Pupils are equal, round, and reactive to light. Cardiovascular:      Rate and Rhythm: Normal rate and regular rhythm. Pulmonary:      Effort: Pulmonary effort is normal.      Breath sounds: Wheezing present. No rhonchi. Comments: Diffuse expiratory wheezes auscultated bilaterally  Abdominal:      General: Abdomen is flat. There is no distension. Palpations: Abdomen is soft. There is no mass. Tenderness: There is no abdominal tenderness. There is no guarding or rebound. Hernia: No hernia is present. Musculoskeletal:         General: Normal range of motion. Cervical back: Normal range of motion and neck supple. Skin:     General: Skin is warm and dry. Capillary Refill: Capillary refill takes less than 2 seconds. Neurological:      General: No focal deficit present. Mental Status: She is alert and oriented to person, place, and time. Psychiatric:         Mood and Affect: Mood normal.         Behavior: Behavior normal.         Thought Content:  Thought content normal.           MEDICAL DECISION MAKING   Initial Assessment:   Acute Exacerbation of COPD, Ongoing Treatment  Vertigo-like symptoms  Viral Pneumonia R/O  Plan:   CBC  BMP  Fluid Challenge  Duoenbs  Meclizine trial  BNP  COVID-Test  Urinalysis  CXR  EKG        ED RESULTS   Laboratory results:  Labs Reviewed   COVID-19 & INFLUENZA COMBO   BASIC METABOLIC PANEL W/ REFLEX TO MG FOR LOW K   BRAIN NATRIURETIC PEPTIDE   CBC WITH AUTO DIFFERENTIAL   TROPONIN   GLOMERULAR FILTRATION RATE, ESTIMATED   URINALYSIS WITH REFLEX TO CULTURE   ANION GAP   OSMOLALITY       Radiologic studies results:  XR CHEST PORTABLE   Final Result   1. No interval change since previous study dated 2/5/2022, no acute cardiopulmonary disease. .               **This report has been created using voice recognition software. It may contain minor errors which are inherent in voice recognition technology. **      Final report electronically signed by DR Jorge Green on 11/1/2022 10:16 AM          ED Medications administered this visit:   Medications   meclizine (ANTIVERT) chewable tablet 25 mg (25 mg Oral Given 11/1/22 1122)   ipratropium-albuterol (DUONEB) nebulizer solution 1 ampule (1 ampule Inhalation Given 11/1/22 1300)         ED COURSE     ED Course as of 11/01/22 1411   Tue Nov 01, 2022   1231 XR CHEST PORTABLE [DE]      ED Course User Index  [DE] Bessy Griffiths MD   All lab work was reviewed and unremarkable. Patient was minimally responsive to meclizine trial.  Patient was moderately responsive to DuoNeb trial as well as fluid challenge. CXR and EKG were unremarkable on review. Strict return precautions and follow up instructions were discussed with the patient prior to discharge, with which the patient agrees.       MEDICATION CHANGES     Discharge Medication List as of 11/1/2022  1:33 PM            FINAL DISPOSITION     Final diagnoses:   General weakness   Drug-induced diarrhea     Condition: condition: fair  Dispo: Discharge to home    The patient was counseled extensively at bedside on maintaining adequate hydration and oral intake while undergoing antibiotic therapy for acute exacerbation of COPD. Patient was counseled on continued abstinence from smoking. Patient was also educated on how antibiotics can cause diarrhea and the need for oral rehydration to compensate for the volume losses associated with diarrhea. All questions were answered fully and the patient verbalized understanding. She is agreeable to follow-up with her primary care provider in 1 week for advanced care recommendations. Patient was provided with a work letter and she may return to work on 11/02/2022. No changes were made to her medication list.  She was counseled extensively on compliance with her albuterol regimen. This transcription was electronically signed. Parts of this transcriptions may have been dictated by use of voice recognition software and electronically transcribed, and parts may have been transcribed with the assistance of an ED scribe. The transcription may contain errors not detected in proofreading. Please refer to my supervising physician's documentation if my documentation differs.     Electronically Signed: Sanju Yi MD, 11/01/22, 2:11 PM        Sanju Yi MD  Resident  11/01/22 7589

## 2022-11-01 NOTE — ED NOTES
Presents to ER with complaints of worsening fatigue and diarrhea that hs been ongoing for one week. Pt states she received the COVID booster and flu shot 8 days ago and 6 days ago she has been having diarrhea and overall fatigue. States this morning she also reported dizziness that is intermittent. EKG competed.      Becky Fraser RN  11/01/22 [id number]

## 2022-11-01 NOTE — ED NOTES
Pt assisted to bathroom again at this time. Pt tearful states \"I'm so weak, my body has no strength. \" PT needing assistance to off toilet and back to bed. Pt anxious and continues to complain of dizziness. Provider notified.      Lisa Fraser RN  11/01/22 6644

## 2022-11-01 NOTE — ED NOTES
Rn assumed care at this time. Report received from [facility]. VS and PT assessed at this time. Pt denies any needs at this time. Respirations easy and unlabored. PT stable at this time.      Guzman Rodarte RN  11/01/22 [id number]

## 2022-11-01 NOTE — ED NOTES
Patient resting in bed. Respirations easy and unlabored. States her dizziness is still present and the medication did not help. Call light within reach.        Lisa Chandler) JACOB Fraser RN  11/01/22 8511

## 2022-11-08 ENCOUNTER — TELEPHONE (OUTPATIENT)
Dept: BARIATRICS/WEIGHT MGMT | Age: 47
End: 2022-11-08

## 2022-11-08 NOTE — TELEPHONE ENCOUNTER
Baldomero Vaughn hd 11/4 [facility] eval appt that she \"no show'd - I got the flu and covid shot and I got sick - I didn't know who to call to cx that visit. She is at the grocery & annot write down pulmonary office phone #- Adivsed her to call and reschedule. Pt voiced understanding.

## 2022-11-10 RX ORDER — ALBUTEROL SULFATE 90 UG/1
AEROSOL, METERED RESPIRATORY (INHALATION)
Qty: 18 G | Refills: 0 | OUTPATIENT
Start: 2022-11-10

## 2022-11-18 NOTE — PROGRESS NOTES
Patient is a 49 y.o. female seen for   initial  MNT visit for  pre op bariatric surgery desires bypass    BMI: Body mass index is 52.59 kg/m². Obesity Classification: Class III    Weight History:   * weight up 10 lbs from initial visit with Dr Jennifer Posadas  Wt Readings from Last 3 Encounters:   11/21/22 (!) 306 lb 6.4 oz (139 kg)   11/01/22 300 lb (136.1 kg)   10/26/22 300 lb (136.1 kg)     [facility] consult 1/6/23    Quit smoking September 2022    History of pre diabetes per pt - on Victoza (started this two month ago) and COPD - managed by PCP    Pt active with [facility]- counseling. In drug rehab three days a week. One year sobriety with drugs will be February 2023. States drug hx last 15 years  M, W and R goes to [facility] for drug treatment - 8a-noon three days a week. Pt signed DINORAH for office visit notes from [facility]. Patient has had a mammogram with the past year. Patient has not had a DEXA scan within the past 2 years. Patient is taking a Multivitamin daily:  doesn't take MVI    Describe your current weight: \"I know I eat big portions\". How does your weight affect your daily activities? concern over medical problems and lack of energy. Patient's lowest adult weight was 200 lbs at age 11. Reports weight problem since a child. Later in visit states at age 10 pt grandmother used to force feed pt to clean plate and then run around the house to throw it up. States at age 14 thru age 28 was in relation ship with father of children and would not allow patient to eat the entire day until late at nite then would overeat. Reports both occurences caused problems with patients relationship with food     Patient's highest adult weight was 306 lbs at age 49. Patient was at her highest weight for ~ last 2 years. Patient has participated in the following weight loss programs: Tae Ronaldo exercises   Patient has not participated in meal replacement/liquid diets.     Patient has not participated in weight loss medications. Patient is not lactose intolerant. - states sometimes milk bothers pt but still drinks it and other dairy products. Patient does not have Voodoo/cultural food concerns. Patient does not have food allergies. Patient dines out to a sit down restaurant 1 times per month or less. Patient has fast food or picks up carry out 2 times per week. Grocery Shopping in household done by: self  Cooking in household done by: self    Lives alone. Works 3p-11p M-W and 11p-7a R and F- works at [employer] at [employer] to bed around 12:30a-1:00am and up by [employer] and takes a nap before goes to work    24 hour recall/food frequency chart:  Breakfast: yes. Will have two eggs, shredded steak, two slices toast with butter and cheese and orange juice (one cup)  Snack: no.  Lunch: yes. 12p-1pm- eats when come home from counseling- vegetable steamer bag with 8 grilled chicken strips, skim milk  Snack: no.   Dinner: yes. 5pm- cooks dinner at work- spaghetti and meatballs, garlic bread- rotating menu or baked pork chops, mashed potatoes and green beans with vegetable  Snack: no. Denies snacking after work. States may have some chocolate if \"emotional day\"  Drinks throughout the day: water bottles four per day, orange juice one cup/day, coffee with sugar substitute and sugar creamer, one cup milk-skim, no pop    Do you drink alcohol? No.     . Patient does not have grazing. Patient does have night eating- \"if I am emotional\"- eats [employer] any time of day when emotional per pt. Patient does have a history of emotional eating or eating out of boredom. It does take an unusually large amount of food for the patient to feel comfortably full. Patient describes self as fast eater      Patient describes level of activity as sedentary.   Just got a dog and plans to walk dog four blocks daily as exercise  Patient will be sent via Email Link to view Fitness Orientation video if desires to use Fitness Center    Assessment  Nutritional Needs: Donita Kamara = 2009 kcal x 1.2 (activity factor)= 2411 kcal - 500-1000 calories/day  (for 1-2 lb weight loss/week)= 9611-3454 calories per day  Food recall reveals hx of disordered eating in past relationships with family and significant other. On own now able to eat three meals a day and recognizes portion sizes are larger. Will benefit from continued counseling with drug history and start counseling related to relationship with food prior to any bariatric surgery    PES Statement:  Overweight/Obesity related to lsedentary lifestyle, varied eating habits, as evidenced by  Body mass index is 52.59 kg/m². .    Surgery  Surgical procedure desired: gastric bypass  Patient's greatest concern about having surgery is: No concerns. Patient describes the motivation for weight loss surgery to be: \"I want more energy\". The expectations following the surgery were reviewed in detail with patient today and patient made aware will require to eliminate carbonated beverages, aim for regular meal times, monitor portion sizes, and balanced meals. .   she does feel she can deal with the dietary restrictions. Patient states she does understand the consequences of not complying with post-op food guidelines. Patient states she understands the long term changes in food intake that will be necessary for all occasions after surgery for the rest of her life. Patient is deemed nutritionally appropriate to proceed if able to show progress towards nutrition behavior changes discussed today over the next several months. Plan  Patient will begin working on recommendations from Pre-Weight Loss Surgery Behavior Change Goal Sheet that was reviewed today to assist with weight loss and establish a  long term healthy eating pattern. Individual goals set with patient to be reviewed at monthly office visits.     Weight loss goal of -5% from initial weight at first visit with Dr Jennifer Posadas reviewed with patient to achieve over 6 month period per insurance requirements. Initial weight was: 296 lbs   -5% Weight Loss goal will be minimum of: 15 lbs weight loss. Plan/Recommendations:  Reviewed with patient that will discuss at Interdisciplinary Team Meeting this week the plan moving forward in this office towards bariatric surgery with hx of drug abuse and currently in rehab program.   Pt voiced understanding. Educational handouts provided and reviewed with patient as follows: Online Support Groups, My Plate Picture Method, Portion Size Guide, Food Journal    -  Patient Instructions   Goals:  1. I will get the lab work done that is mailed to my mailing address before next office visit. You will need to fast 10-12 hours for this (no food or drink besides water). 2  I will aim for at least 64 oz of water each day (= 8 cups per day or four bottled reynolds)  3. I will use my food journal to record meal times, serving sizes and bring back to next dietitian visit. 4   I will increase my physical activity by walking dog total of four blocks every day. 5.  Initial weight loss goal of -5% is recommended over 6 month period. This is a minimum of: 15 lbs from your weight when initially met with physician of: 296 lbs.     We will contact you with next steps in Weight Management Office after Team Meeting Wednesday 11/23/22.    -Followup visit:  to be determined    Denia Mccauley RD, LD   Dietitian- Weight Management [address] [place] [address]

## 2022-11-21 ENCOUNTER — INITIAL CONSULT (OUTPATIENT)
Dept: BARIATRICS/WEIGHT MGMT | Age: 47
End: 2022-11-21

## 2022-11-21 VITALS — WEIGHT: 293 LBS | HEIGHT: 64 IN | BODY MASS INDEX: 50.02 KG/M2

## 2022-11-21 DIAGNOSIS — E66.01 MORBID OBESITY WITH BMI OF 50.0-59.9, ADULT (HCC): Primary | ICD-10-CM

## 2022-11-21 NOTE — PATIENT INSTRUCTIONS
Goals: 1. I will get the lab work done that is mailed to my mailing address before next office visit. You will need to fast 10-12 hours for this (no food or drink besides water). 2  I will aim for at least 64 oz of water each day (= 8 cups per day or four bottled reynolds)  3. I will use my food journal to record meal times, serving sizes and bring back to next dietitian visit. 4   I will increase my physical activity by walking dog total of four blocks every day. 5.  Initial weight loss goal of -5% is recommended over 6 month period. This is a minimum of: 15 lbs from your weight when initially met with physician of: 296 lbs. We will contact you with next steps in Weight Management Office after Team Meeting Wednesday 11/23/22.
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
## 2022-12-13 ENCOUNTER — TELEPHONE (OUTPATIENT)
Dept: BARIATRICS/WEIGHT MGMT | Age: 47
End: 2022-12-13

## 2022-12-13 NOTE — TELEPHONE ENCOUNTER
Returned phone call to patient. Informed patient additional visits in our office are on hold. One year sobriety is February 2023. Pt has appointment with Dr Natalie Nichols 2/8/23. At this time further guidance and recommendations to determine appropriateness of bariatric surgery will be after initial visit with Dr Natalie Nichols. Pt voiced understanding.

## 2022-12-24 ENCOUNTER — HOSPITAL ENCOUNTER (EMERGENCY)
Age: 47
Discharge: HOME | End: 2022-12-24
Attending: EMERGENCY MEDICINE
Payer: COMMERCIAL

## 2022-12-24 VITALS
TEMPERATURE: 97.3 F | SYSTOLIC BLOOD PRESSURE: 154 MMHG | RESPIRATION RATE: 16 BRPM | HEIGHT: 64 IN | DIASTOLIC BLOOD PRESSURE: 87 MMHG | OXYGEN SATURATION: 98 % | WEIGHT: 293 LBS | BODY MASS INDEX: 50.02 KG/M2 | HEART RATE: 74 BPM

## 2022-12-24 DIAGNOSIS — R73.9 HYPERGLYCEMIA: Primary | ICD-10-CM

## 2022-12-24 LAB
ALBUMIN SERPL-MCNC: 4 G/DL (ref 3.5–5.1)
ALP BLD-CCNC: 102 U/L (ref 38–126)
ALT SERPL-CCNC: 44 U/L (ref 11–66)
ANION GAP SERPL CALCULATED.3IONS-SCNC: 11 MEQ/L (ref 8–16)
AST SERPL-CCNC: 23 U/L (ref 5–40)
BASOPHILS # BLD: 0.4 %
BASOPHILS ABSOLUTE: 0 THOU/MM3 (ref 0–0.1)
BETA-HYDROXYBUTYRATE: 0.79 MG/DL (ref 0.2–2.81)
BILIRUB SERPL-MCNC: < 0.2 MG/DL (ref 0.3–1.2)
BUN BLDV-MCNC: 20 MG/DL (ref 7–22)
CALCIUM SERPL-MCNC: 9.5 MG/DL (ref 8.5–10.5)
CHLORIDE BLD-SCNC: 104 MEQ/L (ref 98–111)
CO2: 25 MEQ/L (ref 23–33)
CREAT SERPL-MCNC: 0.7 MG/DL (ref 0.4–1.2)
EOSINOPHIL # BLD: 1.5 %
EOSINOPHILS ABSOLUTE: 0.1 THOU/MM3 (ref 0–0.4)
ERYTHROCYTE [DISTWIDTH] IN BLOOD BY AUTOMATED COUNT: 12.6 % (ref 11.5–14.5)
ERYTHROCYTE [DISTWIDTH] IN BLOOD BY AUTOMATED COUNT: 42 FL (ref 35–45)
GFR SERPL CREATININE-BSD FRML MDRD: > 60 ML/MIN/1.73M2
GLUCOSE BLD-MCNC: 132 MG/DL (ref 70–108)
GLUCOSE BLD-MCNC: 156 MG/DL (ref 70–108)
HCT VFR BLD CALC: 41.9 % (ref 37–47)
HEMOGLOBIN: 14.4 GM/DL (ref 12–16)
IMMATURE GRANS (ABS): 0.01 THOU/MM3 (ref 0–0.07)
IMMATURE GRANULOCYTES: 0.1 %
INFLUENZA A: NOT DETECTED
INFLUENZA B: NOT DETECTED
LYMPHOCYTES # BLD: 43.3 %
LYMPHOCYTES ABSOLUTE: 2.9 THOU/MM3 (ref 1–4.8)
MCH RBC QN AUTO: 31.4 PG (ref 26–33)
MCHC RBC AUTO-ENTMCNC: 34.4 GM/DL (ref 32.2–35.5)
MCV RBC AUTO: 91.5 FL (ref 81–99)
MONOCYTES # BLD: 7.9 %
MONOCYTES ABSOLUTE: 0.5 THOU/MM3 (ref 0.4–1.3)
NUCLEATED RED BLOOD CELLS: 0 /100 WBC
OSMOLALITY CALCULATION: 283.9 MOSMOL/KG (ref 275–300)
PH VENOUS: 7.32 (ref 7.31–7.41)
PLATELET # BLD: 216 THOU/MM3 (ref 130–400)
PMV BLD AUTO: 10.1 FL (ref 9.4–12.4)
POTASSIUM SERPL-SCNC: 4.1 MEQ/L (ref 3.5–5.2)
RBC # BLD: 4.58 MILL/MM3 (ref 4.2–5.4)
SARS-COV-2 RNA, RT PCR: NOT DETECTED
SEG NEUTROPHILS: 46.8 %
SEGMENTED NEUTROPHILS ABSOLUTE COUNT: 3.1 THOU/MM3 (ref 1.8–7.7)
SODIUM BLD-SCNC: 140 MEQ/L (ref 135–145)
TOTAL PROTEIN: 6.9 G/DL (ref 6.1–8)
WBC # BLD: 6.7 THOU/MM3 (ref 4.8–10.8)

## 2022-12-24 PROCEDURE — 2580000003 HC RX 258: Performed by: EMERGENCY MEDICINE

## 2022-12-24 PROCEDURE — 82010 KETONE BODYS QUAN: CPT

## 2022-12-24 PROCEDURE — 80053 COMPREHEN METABOLIC PANEL: CPT

## 2022-12-24 PROCEDURE — 36415 COLL VENOUS BLD VENIPUNCTURE: CPT

## 2022-12-24 PROCEDURE — 85025 COMPLETE CBC W/AUTO DIFF WBC: CPT

## 2022-12-24 PROCEDURE — 87636 SARSCOV2 & INF A&B AMP PRB: CPT

## 2022-12-24 PROCEDURE — 99284 EMERGENCY DEPT VISIT MOD MDM: CPT

## 2022-12-24 PROCEDURE — 82948 REAGENT STRIP/BLOOD GLUCOSE: CPT

## 2022-12-24 PROCEDURE — 82800 BLOOD PH: CPT

## 2022-12-24 RX ORDER — 0.9 % SODIUM CHLORIDE 0.9 %
1000 INTRAVENOUS SOLUTION INTRAVENOUS ONCE
Status: COMPLETED | OUTPATIENT
Start: 2022-12-24 | End: 2022-12-24

## 2022-12-24 RX ADMIN — SODIUM CHLORIDE 1000 ML: 9 INJECTION, SOLUTION INTRAVENOUS at 02:26

## 2022-12-24 ASSESSMENT — PAIN - FUNCTIONAL ASSESSMENT: PAIN_FUNCTIONAL_ASSESSMENT: NONE - DENIES PAIN

## 2022-12-24 NOTE — ED PROVIDER NOTES
[address] [place] [address] [address] [address] EMERGENCY DEPT  [facility] [address]  Phone: [phone]      CHIEF COMPLAINT       Chief Complaint   Patient presents with    Fatigue    Hyperglycemia     C/o weakness and hyperglycemia.  PTA, 156 in triage. Patient states she has been out of her Victoza for a couple of weeks. .         Nurses Notes reviewed and I agree except as noted in the HPI. HISTORY OF PRESENT ILLNESS    Ambreen Perla is a 49 y.o. female. Patient drove herself in tonight. Presents through triage. Complained of general weakness and dizziness but no loss of consciousness. Patient was told that her blood sugar was high but was actually below 200. Does not complain of any body aches or fever    REVIEW OF SYSTEMS         No coughing, no chest pain, no dyspnea    Remainder of review of systems is otherwise reviewed as negative. PAST MEDICAL HISTORY    has a past medical history of Anxiety, Asthma, Bipolar 1 disorder ([address] COPD (chronic obstructive pulmonary disease) ([facility] [address] Depression, and PTSD (post-traumatic stress disorder). SURGICAL HISTORY      has a past surgical history that includes Tubal ligation.     CURRENT MEDICATIONS       Previous Medications    ALBUTEROL (PROVENTIL) (2.5 MG/3ML) 0.083% NEBULIZER SOLUTION    Take 3 mLs by nebulization every 6 hours as needed for Wheezing    ALBUTEROL (PROVENTIL) (2.5 MG/3ML) 0.083% NEBULIZER SOLUTION    Take 3 mLs by nebulization every 6 hours as needed for Wheezing    ALBUTEROL SULFATE HFA (VENTOLIN HFA) 108 (90 BASE) MCG/ACT INHALER    Inhale 2 puffs into the lungs 4 times daily as needed for Wheezing    ALBUTEROL SULFATE HFA (VENTOLIN HFA) 108 (90 BASE) MCG/ACT INHALER    Inhale 2 puffs into the lungs every 4 hours as needed for Wheezing or Shortness of Breath    ATORVASTATIN (LIPITOR) 10 MG TABLET    Take 10 mg by mouth daily    FLUTICASONE (FLOVENT HFA) 110 MCG/ACT INHALER    Inhale 1 puff into the lungs 2 times daily    HYDROXYZINE HCL (ATARAX) 50 MG TABLET    Take 1 tablet by mouth 4 times daily as needed for Itching Caution do not take at work will cause drowsiness    LIRAGLUTIDE (VICTOZA) 18 MG/3ML SOPN SC INJECTION    Inject into the skin    MELOXICAM (MOBIC) 15 MG TABLET    Take 1 tablet by mouth daily    METFORMIN (GLUCOPHAGE) 500 MG TABLET    Take 1,000 mg by mouth nightly    TIZANIDINE (ZANAFLEX) 4 MG TABLET    Take 1 tablet by mouth 4 times daily as needed (back pain)    UNIFINE PENTIPS 32G X 4 MM MISC    USE TO INJECT victoza UNDER THE SKIN ONCE A DAY    VICTOZA 18 MG/3ML SOPN SC INJECTION    INJECT 0.6 MG INTO THE SKIN ONCE DAILY       ALLERGIES     is allergic to blue dyes (parenteral) and penicillins. FAMILY HISTORY     She indicated that her mother is alive. She indicated that her father is alive. She indicated that her maternal grandmother is alive. She indicated that her paternal grandmother is alive. She indicated that her paternal grandfather is alive. family history includes Asthma in her mother; COPD in her mother; Cancer in her maternal grandmother; Diabetes in her paternal grandmother. SOCIAL HISTORY      reports that she quit smoking about 3 months ago. Her smoking use included cigarettes. She has a 12.50 pack-year smoking history. She has never used smokeless tobacco. She reports that she does not drink alcohol and does not use drugs. PHYSICAL EXAM     INITIAL VITALS:  height is 5' 4\" (1.626 m) and weight is 306 lb (138.8 kg) (abnormal). Her oral temperature is 97.3 °F (36.3 °C). Her blood pressure is 128/56 (abnormal) and her pulse is 74. Her respiration is 18 and oxygen saturation is 99%. Constitutional: Well appearing and non-toxic   Eyes:  Pupils are equal and reactive  HENT:  Atraumatic appearing  oropharynx moist, no pharyngeal exudates.   Neck- normal range of motion, supple   Respiratory:  No wheezing, rhonchi or rales  Cardiovascular: regular  GI:  Non tender, no rigidity, rebound or guarding  Musculoskeletal:  2/4 distal pulses, no pitting edema  Integument: warm and dry  Neurologic:  Alert & oriented x 3, cranial nerves II through XII are grossly intact. Equal strength in the upper and lower extremities bilaterally. Psychiatric:  Speech and behavior appropriate        DIAGNOSTIC RESULTS         LABS:   Labs Reviewed   COMPREHENSIVE METABOLIC PANEL - Abnormal; Notable for the following components:       Result Value    Glucose 132 (*)     Total Bilirubin <0.2 (*)     All other components within normal limits   POCT GLUCOSE - Abnormal; Notable for the following components:    POC Glucose 156 (*)     All other components within normal limits   COVID-19 & INFLUENZA COMBO   PH VENOUS   BETA-HYDROXYBUTYRATE   CBC WITH AUTO DIFFERENTIAL   ANION GAP   GLOMERULAR FILTRATION RATE, ESTIMATED   OSMOLALITY       EMERGENCY DEPARTMENT COURSE:   Vitals:    Vitals:    12/24/22 0215 12/24/22 0227   BP: (!) 159/82 (!) 128/56   Pulse: 78 74   Resp: 18 18   Temp: 97.3 °F (36.3 °C)    TempSrc: Oral    SpO2: 99% 99%   Weight: (!) 306 lb (138.8 kg)    Height: 5' 4\" (1.626 m)      I have reviewed the patient's laboratory results. There is no evidence of DKA in fact her blood sugar seems to have corrected itself. She tested negative for COVID and influenza. She did receive a dose of IV fluids. At this point she states she is feeling better. She is advised to follow-up in 3 to 5 days. CRITICAL CARE:   none         FINAL IMPRESSION      1.  Hyperglycemia    Dizziness of uncertain etiology      DISPOSITION/PLAN   discharged    DISCHARGE MEDICATIONS:  New Prescriptions    No medications on file       (Please note that portions of this note were completed with a voice recognition program.  Efforts were made to edit the dictations but occasionally words are mis-transcribed.)    Raphael Medley, [address] [place] [address] Omar, DO  12/24/22 0257

## 2022-12-24 NOTE — DISCHARGE INSTRUCTIONS
Follow-up needed with your primary physician in 3 to 5 days. Return to ER for any new symptoms or concerns. Be sure to check your blood sugar frequently throughout the weekend.

## 2022-12-24 NOTE — Clinical Note
Gonsalo Torres was seen and treated in our emergency department on 12/24/2022. She may return to work on 12/26/2022. If you have any questions or concerns, please don't hesitate to call.       Margarito Delgado, DO

## 2023-01-02 ENCOUNTER — HOSPITAL ENCOUNTER (EMERGENCY)
Age: 48
Discharge: HOME | End: 2023-01-02
Payer: COMMERCIAL

## 2023-01-02 ENCOUNTER — APPOINTMENT (OUTPATIENT)
Dept: GENERAL RADIOLOGY | Age: 48
End: 2023-01-02
Payer: COMMERCIAL

## 2023-01-02 VITALS
TEMPERATURE: 98.1 F | SYSTOLIC BLOOD PRESSURE: 120 MMHG | DIASTOLIC BLOOD PRESSURE: 63 MMHG | RESPIRATION RATE: 20 BRPM | HEIGHT: 64 IN | BODY MASS INDEX: 50.02 KG/M2 | OXYGEN SATURATION: 97 % | HEART RATE: 67 BPM | WEIGHT: 293 LBS

## 2023-01-02 DIAGNOSIS — J44.1 COPD EXACERBATION (HCC): Primary | ICD-10-CM

## 2023-01-02 DIAGNOSIS — Z87.898 HISTORY OF PREDIABETES: ICD-10-CM

## 2023-01-02 PROCEDURE — 96372 THER/PROPH/DIAG INJ SC/IM: CPT

## 2023-01-02 PROCEDURE — 71046 X-RAY EXAM CHEST 2 VIEWS: CPT

## 2023-01-02 PROCEDURE — 99213 OFFICE O/P EST LOW 20 MIN: CPT | Performed by: NURSE PRACTITIONER

## 2023-01-02 PROCEDURE — 6370000000 HC RX 637 (ALT 250 FOR IP): Performed by: NURSE PRACTITIONER

## 2023-01-02 PROCEDURE — 99214 OFFICE O/P EST MOD 30 MIN: CPT

## 2023-01-02 PROCEDURE — 6360000002 HC RX W HCPCS: Performed by: NURSE PRACTITIONER

## 2023-01-02 PROCEDURE — 94640 AIRWAY INHALATION TREATMENT: CPT

## 2023-01-02 RX ORDER — PREDNISONE 20 MG/1
20 TABLET ORAL 2 TIMES DAILY
Qty: 10 TABLET | Refills: 0 | Status: SHIPPED | OUTPATIENT
Start: 2023-01-03 | End: 2023-01-08

## 2023-01-02 RX ORDER — METHYLPREDNISOLONE SODIUM SUCCINATE 125 MG/2ML
125 INJECTION, POWDER, LYOPHILIZED, FOR SOLUTION INTRAMUSCULAR; INTRAVENOUS ONCE
Status: COMPLETED | OUTPATIENT
Start: 2023-01-02 | End: 2023-01-02

## 2023-01-02 RX ORDER — IPRATROPIUM BROMIDE AND ALBUTEROL SULFATE 2.5; .5 MG/3ML; MG/3ML
1 SOLUTION RESPIRATORY (INHALATION) ONCE
Status: COMPLETED | OUTPATIENT
Start: 2023-01-02 | End: 2023-01-02

## 2023-01-02 RX ORDER — ALBUTEROL SULFATE 2.5 MG/3ML
2.5 SOLUTION RESPIRATORY (INHALATION) EVERY 4 HOURS PRN
Qty: 30 EACH | Refills: 0 | Status: SHIPPED | OUTPATIENT
Start: 2023-01-02 | End: 2023-01-06 | Stop reason: SDUPTHER

## 2023-01-02 RX ADMIN — IPRATROPIUM BROMIDE AND ALBUTEROL SULFATE 1 AMPULE: .5; 3 SOLUTION RESPIRATORY (INHALATION) at 15:26

## 2023-01-02 RX ADMIN — METHYLPREDNISOLONE SODIUM SUCCINATE 125 MG: 125 INJECTION, POWDER, FOR SOLUTION INTRAMUSCULAR; INTRAVENOUS at 15:27

## 2023-01-02 ASSESSMENT — ENCOUNTER SYMPTOMS
ABDOMINAL PAIN: 0
WHEEZING: 1
CHEST TIGHTNESS: 0
EYE ITCHING: 0
SORE THROAT: 0
EYE REDNESS: 0
SINUS PRESSURE: 0
VOMITING: 0
SHORTNESS OF BREATH: 1
DIARRHEA: 0
COUGH: 1
NAUSEA: 0

## 2023-01-02 ASSESSMENT — PAIN - FUNCTIONAL ASSESSMENT: PAIN_FUNCTIONAL_ASSESSMENT: NONE - DENIES PAIN

## 2023-01-02 NOTE — Clinical Note
Erica Delong was seen and treated in our emergency department on 1/2/2023. She may return to work on 01/04/2023. If you have any questions or concerns, please don't hesitate to call.       PANTERA Blake - CNP

## 2023-01-02 NOTE — ED PROVIDER NOTES
[address] [facility] Encounter      CHIEFCOMPLAINT       Chief Complaint   Patient presents with    COPD           Shortness of Breath       Nurses Notes reviewed and I agree except as noted in the HPI. HISTORY OF PRESENT ILLNESS   Marybel Hairston is a 49 y.o. female who presents to the urgent care for evaluation. She developed shortness of breath, cough, and wheezing today. She used her inhalers at home with no improvement. She feels like she is having a COPD exacerbation. She declines fever or recent illness. She declines COVID and influenza testing today. The patient/patient representative has no other acute complaints at this time. REVIEW OF SYSTEMS     Review of Systems   Constitutional:  Negative for chills, fatigue and fever. HENT:  Negative for congestion, ear pain, sinus pressure and sore throat. Eyes:  Negative for redness and itching. Respiratory:  Positive for cough, shortness of breath and wheezing. Negative for chest tightness. Cardiovascular:  Negative for chest pain. Gastrointestinal:  Negative for abdominal pain, diarrhea, nausea and vomiting. Skin:  Negative for rash. Allergic/Immunologic: Negative for environmental allergies and food allergies. Neurological:  Negative for headaches. Hematological:  Negative for adenopathy. PAST MEDICAL HISTORY         Diagnosis Date    Anxiety     Asthma     Bipolar 1 disorder (HCC)     COPD (chronic obstructive pulmonary disease) (HCC)     Depression     PTSD (post-traumatic stress disorder)        SURGICAL HISTORY     Patient  has a past surgical history that includes Tubal ligation.     CURRENT MEDICATIONS       Discharge Medication List as of 1/2/2023  4:14 PM        CONTINUE these medications which have NOT CHANGED    Details   !! albuterol sulfate HFA (VENTOLIN HFA) 108 (90 Base) MCG/ACT inhaler Inhale 2 puffs into the lungs every 4 hours as needed for Wheezing or Shortness of Breath, Disp-18 g, R-0Normal      !! albuterol (PROVENTIL) (2.5 MG/3ML) 0.083% nebulizer solution Take 3 mLs by nebulization every 6 hours as needed for Wheezing, Disp-120 each, R-3Normal      !! VICTOZA 18 MG/3ML SOPN SC injection INJECT 0.6 MG INTO THE SKIN ONCE DAILY, DAWHistorical Med      !! Liraglutide (VICTOZA) 18 MG/3ML SOPN SC injection Inject into the skinHistorical Med      UNIFINE PENTIPS 32G X 4 MM MISC SEAN, Historical Med      hydrOXYzine HCl (ATARAX) 50 MG tablet Take 1 tablet by mouth 4 times daily as needed for Itching Caution do not take at work will cause drowsiness, Disp-20 tablet, R-0Print      metFORMIN (GLUCOPHAGE) 500 MG tablet Take 1,000 mg by mouth nightlyHistorical Med      atorvastatin (LIPITOR) 10 MG tablet Take 10 mg by mouth dailyHistorical Med      meloxicam (MOBIC) 15 MG tablet Take 1 tablet by mouth daily, Disp-30 tablet, R-0Normal      tiZANidine (ZANAFLEX) 4 MG tablet Take 1 tablet by mouth 4 times daily as needed (back pain), Disp-40 tablet, R-0Normal      !! albuterol sulfate HFA (VENTOLIN HFA) 108 (90 Base) MCG/ACT inhaler Inhale 2 puffs into the lungs 4 times daily as needed for Wheezing, Disp-54 g, R-1Normal      fluticasone (FLOVENT HFA) 110 MCG/ACT inhaler Inhale 1 puff into the lungs 2 times daily, Disp-12 g, R-0Normal      !! albuterol (PROVENTIL) (2.5 MG/3ML) 0.083% nebulizer solution Take 3 mLs by nebulization every 6 hours as needed for Wheezing, Disp-120 each, R-0Normal       !! - Potential duplicate medications found. Please discuss with provider. ALLERGIES     Patient is is allergic to blue dyes (parenteral) and penicillins. FAMILY HISTORY     Patient'sfamily history includes Asthma in her mother; COPD in her mother; Cancer in her maternal grandmother; Diabetes in her paternal grandmother. SOCIAL HISTORY     Patient  reports that she quit smoking about 3 months ago. Her smoking use included cigarettes. She has a 12.50 pack-year smoking history.  She has never used smokeless tobacco. She reports that she does not drink alcohol and does not use drugs. PHYSICAL EXAM     ED TRIAGE VITALS  BP: 120/63, Temp: 98.1 °F (36.7 °C), Heart Rate: 67, Resp: 20, SpO2: 97 %  Physical Exam    DIAGNOSTIC RESULTS   Labs:  Abnormal Labs Reviewed - No data to display     IMAGING:  XR CHEST (2 VW)   Final Result   Normal chest. No acute findings. **This report has been created using voice recognition software. It may contain minor errors which are inherent in voice recognition technology. **      Final report electronically signed by Dr. Louie Skaggs on 1/2/2023 4:01 PM        URGENT CARE COURSE:     Vitals:    01/02/23 1523 01/02/23 1559   BP: 133/87 120/63   Pulse: 88 67   Resp: 20 20   Temp:  98.1 °F (36.7 °C)   TempSrc: Temporal Temporal   SpO2: 97% 97%   Weight: (!) 306 lb (138.8 kg)    Height: 5' 4\" (1.626 m)        Medications   ipratropium-albuterol (DUONEB) nebulizer solution 1 ampule (1 ampule Inhalation Given 1/2/23 1526)   methylPREDNISolone sodium (SOLU-MEDROL) injection 125 mg (125 mg IntraMUSCular Given 1/2/23 1527)     PROCEDURES:  FINALIMPRESSION      1. COPD exacerbation ([facility]    2. History of prediabetes        DISPOSITION/PLAN   DISPOSITION Decision To Discharge 01/02/2023 04:07:27 PM      Re-evaluation after treatment and steroids, patient states that she feels much better breathing is easier.        Problem List Items Addressed This Visit    None  Visit Diagnoses       COPD exacerbation ([facility]    -  Primary    Relevant Medications    ipratropium-albuterol (DUONEB) nebulizer solution 1 ampule (Completed)    methylPREDNISolone sodium (SOLU-MEDROL) injection 125 mg (Completed)    albuterol (PROVENTIL) (2.5 MG/3ML) 0.083% nebulizer solution    predniSONE (DELTASONE) 20 MG tablet (Start on 1/3/2023)    History of prediabetes        Relevant Medications    blood glucose monitor kit and supplies            Physical assessment findings, diagnostic testing(s) if applicable, and vital signs reviewed with patient/patient representative. Differential diagnosis(s) discussed with patient/patient representative. Prescription medications and/or over-the-counter medications for symptom management discussed. Patient is to follow-up with family care provider in 2-3 days if no improvement. If symptoms should worsen or change, go to the ED. Patient/patient representative is aware of care plan, questions answered, verbalizes understanding and is in agreement. Printed instructions attached to after visit summary. PATIENT REFERRED TO:  PANTERA Fleming CNP  [address]  [phone]    Schedule an appointment as soon as possible for a visit in 3 days  For further evaluation. , If symptoms change/worsen, go to the [facility], APRN - CNP    Please note that some or all of this chart was generated using [employer] voice recognition software. Although every effort was made to ensure the accuracy of this automated transcription, some errors in transcription may have occurred.         PANTERA Odonnell CNP  01/02/23 8657

## 2023-01-06 ENCOUNTER — INITIAL CONSULT (OUTPATIENT)
Dept: PULMONOLOGY | Age: 48
End: 2023-01-06
Payer: COMMERCIAL

## 2023-01-06 VITALS
BODY MASS INDEX: 50.02 KG/M2 | TEMPERATURE: 97.1 F | OXYGEN SATURATION: 98 % | SYSTOLIC BLOOD PRESSURE: 118 MMHG | HEART RATE: 84 BPM | DIASTOLIC BLOOD PRESSURE: 68 MMHG | HEIGHT: 64 IN | WEIGHT: 293 LBS

## 2023-01-06 DIAGNOSIS — G47.30 SLEEP APNEA, UNSPECIFIED TYPE: ICD-10-CM

## 2023-01-06 DIAGNOSIS — G47.10 HYPERSOMNIA: ICD-10-CM

## 2023-01-06 DIAGNOSIS — F43.10 PTSD (POST-TRAUMATIC STRESS DISORDER): ICD-10-CM

## 2023-01-06 DIAGNOSIS — Z01.818 PRE-OP EVALUATION: ICD-10-CM

## 2023-01-06 DIAGNOSIS — R06.83 SNORING: Primary | ICD-10-CM

## 2023-01-06 PROCEDURE — G8417 CALC BMI ABV UP PARAM F/U: HCPCS | Performed by: INTERNAL MEDICINE

## 2023-01-06 PROCEDURE — G8427 DOCREV CUR MEDS BY ELIG CLIN: HCPCS | Performed by: INTERNAL MEDICINE

## 2023-01-06 PROCEDURE — 99204 OFFICE O/P NEW MOD 45 MIN: CPT | Performed by: INTERNAL MEDICINE

## 2023-01-06 PROCEDURE — 1036F TOBACCO NON-USER: CPT | Performed by: INTERNAL MEDICINE

## 2023-01-06 PROCEDURE — G8484 FLU IMMUNIZE NO ADMIN: HCPCS | Performed by: INTERNAL MEDICINE

## 2023-01-06 RX ORDER — DOXYCYCLINE HYCLATE 100 MG/1
100 CAPSULE ORAL 2 TIMES DAILY
COMMUNITY

## 2023-01-06 NOTE — PATIENT INSTRUCTIONS
Recommendations/Plan:  -Patient educated to quit smoking as soon as possible. Patient verbalizes understanding of adverse consequences of tobacco smoking.  - Schedule patient for nocturnal polysomnogram (Sleep study) with split night protocol at [facility] sleep lab to diagnose sleep apnea and to get optimal pressure I.e CPAP or BiPAP to start/continue PAP therapy. Patient to follow with my clinic at [facility] sleep clinic in 6 to 8 weeks with CPAP download for further evaluation.  -I had a discussion with patient regarding avialable treatment options for her sleep disorder breathing including but not limited to CPAP titration in the sleep lab Vs.Dental appliance placement with referral to a local dentist Vs other available surgical options including Uvulopalatopharyngoplasty, maxillomandibular ostomy and tracheostomy as last option. At the end of discussion, she decided on CPAP/BiPAP/AutoSV as a treatment if she found to have obstructive sleep apnea during above test/study.  -She was advised to keep her scheduled follow-up appointment with her family physician regarding COPD management along with inhalers.  -She was educated to practice good sleep hygiene practices. She was provided with a good sleep hygiene hand out.  -Markos Corona was advised to make earlier appointment with my clinic if she develops any worsening of sleep symptoms. She verbalizes understanding.  -Markos Corona was advised to not to drive any motor vehicles or operate heavy equipment until her sleep symptoms are under good control. Ashby Boxer verbalizes understanding.  Cory Jaime advised to loose weight by controlling diet and doing exercise. She is currently following with [facility] weight management center with Dr. Rina Antoine was educated about my impression and plan. She verbalizes understanding.

## 2023-01-06 NOTE — PROGRESS NOTES
[facility] and [facility] initial consultation note    Zacarias Muller                                                Chief complaint: Zacarias Muller is a 49 y. o.oldfemale w/ PMHx of Asthma, COPD, prediabetes, HLD, Tobacco use Hx (12.5 pack years quit ), polysubstance use, Bipolar 1, and morbid obesity came for further evaluation regarding her pre operative evaluation for sleep apnea  with referral from [address] [place] [address].     She is currently waiting for bariatric surgery in       [facility]:    Sleep/Wake schedule:  Usual time to go to bed during the work/regular day of week:   Usual time to wake up during the work//regular day of week:   Over the weekends her sleep schedule: [] Remain same. []phase delayed. She usually falls a sleep in less than:   She takes naps: {YES/NO:}. Number of naps per week:   During each nap she spends a total of: The naps were reported as refreshing: {YES/NO:}. Sleep Hygiene:    Is the temperature and evironment in her bed room is acceptable to her: Yes. She watches Television in her bed room: {YES/NO:}. She read books, study, pay bills etc in the bed: {YES/NO:}. Frequency She wake up during night/sleep:   Majority of nocturnal awakenings are for urination: {YES/NO:}. Difficulty in falling back to sleep after nocturnal awakenings: {YES/NO:}  . Do you drink coffee: {YES/NO:}. Do you drink caffeinated beverages i.e sodas: {YES/NO:}. Do you drink tea:{YES/NO:}. Do you drink alcoholic beverages: {ONQ/H}. History of recreational drug use: {YES/NO:}.     Social History     Tobacco Use    Smoking status: Former     Packs/day: 0.50     Years: 25.00     Pack years: 12.50     Types: Cigarettes     Quit date: 2022     Years since quittin.3    Smokeless tobacco: Never   Vaping Use    Vaping Use: Never used   Substance Use Topics    Alcohol use: No     Comment: occasionally    Drug use: No     Comment: clean 30 days marijuana and cocaine 5/26/2020       Sleep apnea symptoms:  Noticed to have loud snoring:{YES/NO:19726}. Noted by her family member- {Persons; family members:190004}  Witnessed apneas during sleep noticed: {YES/NO:19726}. Noted by her family member- {Persons; family members:[id number]}  History of choking and gasping sensation at night time: {YES/NO:19726}. History of headaches in the morning:{YES/NO:19726}. History of dry mouth in the morning: {YES/NO:19726}. History of palpitations during night time/nocturnal awakenings: {YES/NO:19726}. History of sweating during night time/nocturnal awakenings: {YES/NO:19726}    General:  History of head injury in the past: {YES/NO:19726}. []  Due to MVA  [] Not due to MVA. History of seizures: {YES/NO:19726}. Rest less legs syndrome symptoms:NO  History suggestive of periodic limb movements during sleep: NO  History suggestive of hypnagogic hallucinations: NO  History suggestive of hypnopompic hallucinations: NO  History suggestive of sleep talking:NO  History suggestive of sleep walking:NO  History suggestive of bruxism: {YES/NO:19726}. [] No mouth guard. [] Uses mouth guard. History suggestive of cataplexy: NO  History suggestive of sleep paralysis: NO    Family history of sleep disorders:  Family history of obstructive sleep apnea: {YES/NO:19726}. Family history of Narcolepsy: {YES/NO:19726}. Family history of Rest less legs syndrome : {YES/NO:19726}. History regarding old sleep studies:  Prior history of sleep study: {YES/NO:19726}. Using CPAP device: {YES/NO:19726}. Currently using home Oxygen: NO.       Patient considerations:  Is the patient is ambulatory: Yes  Patient is currently using: None of these-> Wheelchair, Sherrie Rota or Lexine Becky.   Para/Quadriplegic: NO  Hearing deficit : NO  Claustrophobic: NO  MDD : NO  Blind: NO  Incontinent: NO  Para/Quadraplegi: NO. Need transportation to and from Sleep Center:NO      Social History:  Social History     Tobacco Use    Smoking status: Former     Packs/day: 0.50     Years: 25.00     Pack years: 12.50     Types: Cigarettes     Quit date: 2022     Years since quittin.3    Smokeless tobacco: Never   Vaping Use    Vaping Use: Never used   Substance Use Topics    Alcohol use: No     Comment: occasionally    Drug use: No     Comment: clean 30 days marijuana and cocaine 2020   . She is currently working: {YES/NO:}. [] Retired. []Disabled     She usually goes to her work at:    She completes her work at:                            Past Medical History:   Diagnosis Date    Anxiety     Asthma     Bipolar 1 disorder ([facility]     COPD (chronic obstructive pulmonary disease) ([facility]     Depression     PTSD (post-traumatic stress disorder)        Past Surgical History:   Procedure Laterality Date    TUBAL LIGATION         Allergies   Allergen Reactions    Blue Dyes (Parenteral) Anaphylaxis    Penicillins Nausea And Vomiting       Current Outpatient Medications   Medication Sig Dispense Refill    albuterol (PROVENTIL) (2.5 MG/3ML) 0.083% nebulizer solution Take 3 mLs by nebulization every 4 hours as needed for Wheezing 30 each 0    predniSONE (DELTASONE) 20 MG tablet Take 1 tablet by mouth 2 times daily for 5 days 10 tablet 0    blood glucose monitor kit and supplies Dispense sufficient amount for indicated testing frequency plus additional to accommodate PRN testing needs. Dispense all needed supplies to include: monitor, strips, lancing device, lancets, control solutions, alcohol swabs.  1 kit 0    albuterol sulfate HFA (VENTOLIN HFA) 108 (90 Base) MCG/ACT inhaler Inhale 2 puffs into the lungs every 4 hours as needed for Wheezing or Shortness of Breath 18 g 0    albuterol (PROVENTIL) (2.5 MG/3ML) 0.083% nebulizer solution Take 3 mLs by nebulization every 6 hours as needed for Wheezing 120 each 3    VICTOZA 18 MG/3ML SOPN SC injection INJECT 0.6 MG INTO THE SKIN ONCE DAILY      Liraglutide (VICTOZA) 18 MG/3ML SOPN SC injection Inject into the skin      UNIFINE PENTIPS 32G X 4 MM MISC USE TO INJECT victoza UNDER THE SKIN ONCE A DAY      hydrOXYzine HCl (ATARAX) 50 MG tablet Take 1 tablet by mouth 4 times daily as needed for Itching Caution do not take at work will cause drowsiness (Patient not taking: No sig reported) 20 tablet 0    metFORMIN (GLUCOPHAGE) 500 MG tablet Take 1,000 mg by mouth nightly (Patient not taking: Reported on 9/9/2022)      atorvastatin (LIPITOR) 10 MG tablet Take 10 mg by mouth daily      meloxicam (MOBIC) 15 MG tablet Take 1 tablet by mouth daily (Patient not taking: No sig reported) 30 tablet 0    tiZANidine (ZANAFLEX) 4 MG tablet Take 1 tablet by mouth 4 times daily as needed (back pain) (Patient not taking: No sig reported) 40 tablet 0    albuterol sulfate HFA (VENTOLIN HFA) 108 (90 Base) MCG/ACT inhaler Inhale 2 puffs into the lungs 4 times daily as needed for Wheezing 54 g 1    fluticasone (FLOVENT HFA) 110 MCG/ACT inhaler Inhale 1 puff into the lungs 2 times daily (Patient not taking: No sig reported) 12 g 0    albuterol (PROVENTIL) (2.5 MG/3ML) 0.083% nebulizer solution Take 3 mLs by nebulization every 6 hours as needed for Wheezing 120 each 0     No current facility-administered medications for this visit. Family History   Problem Relation Age of Onset    Asthma Mother     COPD Mother     Diabetes Paternal Grandmother     Cancer Maternal Grandmother         Review of Systems:   General/Constitutional: No recent loss of weight or appetite changes. No fever or chills. HENT: Negative. Eyes: Negative. Upper respiratory tract: No nasal stuffiness or post nasal drip. Lower respiratory tract/ lungs: No cough or sputum production. No hemoptysis. Cardiovascular: No palpitations or chest pain. Gastrointestinal: No nausea or vomiting. Neurological: No focal neurologiacal weakness.   Extremities: No edema. Musculoskeletal: No complaints. Genitourinary: No complaints. Hematological: Negative. Psychiatric/Behavioral: Negative. Skin: No itching. LMP 04/18/2021 Comment: age 38  BMI:  There is no height or weight on file to calculate BMI. Physical Exam   Nursing note and vitals reviewed. Constitutional: Patient appears moderately built and moderately nourished. No distress. Patient is oriented to person, place, and time. HENT:   Head: Normocephalic and atraumatic. Right Ear: External ear normal.   Left Ear: External ear normal.   Mouth/Throat: Oropharynx is clear and moist.  No oral thrush. Eyes: Conjunctivae are normal. Pupils are equal, round, and reactive to light. No scleral icterus. Neck: Neck supple. No JVD present. No tracheal deviation present. Cardiovascular: Normal rate, regular rhythm, normal heart sounds. No murmur heard. Pulmonary/Chest: Effort normal and breath sounds normal. No stridor. No respiratory distress. No wheezes. No rales. Patient exhibits no tenderness. Abdominal: Soft. Patient exhibits no distension. No tenderness. Musculoskeletal: Normal range of motion. Extremities: Patient exhibits no edema and no tenderness. Lymphadenopathy:  No cervical adenopathy. Neurological: Patient is alert and oriented to person, place, and time. Skin: Skin is warm and dry. Patient is not diaphoretic. Psychiatric: Patient  has a normal mood and affect. Patient behavior is normal.     Diagnostic Data:  None related sleep. Assessment:  -Snoring {Desc; with/without:5700} witnessed apneas,frequent nocturnal awakenings and excessive daytime sleepiness to evaluate for obstructive sleep apnea. -Inadequate sleep hygiene.  -Pre operative evaluation for planned bariatric surgery.     Past Medical History:   Diagnosis Date    Anxiety     Asthma     Bipolar 1 disorder (HCC)     COPD (chronic obstructive pulmonary disease) (HCC)     Depression     PTSD (post-traumatic stress disorder)                  Recommendations/Plan:  -Will schedule patient for polysomnogram in the sleep lab. -I had a discussion with patient regarding avialable treatment options for her sleep disorder breathing including but not limited to CPAP titration in the sleep lab Vs.Dental appliance placement with referral to a local dentist Vs other available surgical options including Uvulopalatopharyngoplasty, maxillomandibular ostomy, Inspire device placement and tracheostomy as last option. At the end of discussion, she is not decided on her   treatment if she found to have obstructive sleep apnea at this time.  -We will see Merriam Burkitt back in 1week after the sleep study to go over the sleep study results and further management options.  -She was educated to practice good sleep hygiene practices. She  was provided with a good sleep hygiene hand out.  -Fareed Amaya was advised to make earlier appointment with my clinic if she develops any worsening of sleep symptoms. She verbalizes understanding.  -Fareed Amaya was advised to not to drive any motor vehicles or operate heavy equipment until her sleep symptoms are under good control. Bren Burkitt verbalizes understanding.  -She was advised to loose weight by controlling diet and doing exercise once cleared by her family physician. - Merriam Burkitt was educated about my impression and plan. She verbalizes understanding.      -I personally reviewed updated the Past medical hx, Past surgical hx,Social hx, Family hx, Medications, Allergies in the discrete data section of the patient chart along with labs, Pulmonary medicine,Sleep medicine related, Pathological, Microbiological and Radiological investigations.

## 2023-01-06 NOTE — PROGRESS NOTES
Chief Complaint: Mateo Adkins is a new sleep consult no prior studies    Mallampati airway Class:2  Neck Circumference:. 16 Inches    Hersey sleepiness score 1/6/23: 8  Sleep apnea quality of life questionnaire:.47

## 2023-02-05 ENCOUNTER — HOSPITAL ENCOUNTER (OUTPATIENT)
Dept: SLEEP CENTER | Age: 48
Discharge: HOME | End: 2023-02-05

## 2023-02-22 ENCOUNTER — HOSPITAL ENCOUNTER (EMERGENCY)
Age: 48
Discharge: HOME | End: 2023-02-22
Payer: COMMERCIAL

## 2023-02-22 VITALS
HEART RATE: 93 BPM | SYSTOLIC BLOOD PRESSURE: 123 MMHG | DIASTOLIC BLOOD PRESSURE: 70 MMHG | TEMPERATURE: 98.2 F | RESPIRATION RATE: 20 BRPM | OXYGEN SATURATION: 96 % | WEIGHT: 293 LBS | BODY MASS INDEX: 51.49 KG/M2

## 2023-02-22 DIAGNOSIS — J44.1 COPD EXACERBATION (HCC): Primary | ICD-10-CM

## 2023-02-22 PROCEDURE — 6370000000 HC RX 637 (ALT 250 FOR IP): Performed by: NURSE PRACTITIONER

## 2023-02-22 PROCEDURE — 99213 OFFICE O/P EST LOW 20 MIN: CPT

## 2023-02-22 PROCEDURE — 94640 AIRWAY INHALATION TREATMENT: CPT

## 2023-02-22 PROCEDURE — 99213 OFFICE O/P EST LOW 20 MIN: CPT | Performed by: NURSE PRACTITIONER

## 2023-02-22 RX ORDER — IPRATROPIUM BROMIDE AND ALBUTEROL SULFATE 2.5; .5 MG/3ML; MG/3ML
1 SOLUTION RESPIRATORY (INHALATION) ONCE
Status: COMPLETED | OUTPATIENT
Start: 2023-02-22 | End: 2023-02-22

## 2023-02-22 RX ORDER — PREDNISONE 10 MG/1
TABLET ORAL
Qty: 11 TABLET | Refills: 0 | Status: SHIPPED | OUTPATIENT
Start: 2023-02-22 | End: 2023-03-01

## 2023-02-22 RX ORDER — ALBUTEROL SULFATE 90 UG/1
2 AEROSOL, METERED RESPIRATORY (INHALATION) EVERY 6 HOURS PRN
Qty: 18 G | Refills: 3 | Status: SHIPPED | OUTPATIENT
Start: 2023-02-22

## 2023-02-22 RX ORDER — ALBUTEROL SULFATE 2.5 MG/3ML
2.5 SOLUTION RESPIRATORY (INHALATION) EVERY 6 HOURS PRN
Qty: 120 EACH | Refills: 3 | Status: SHIPPED | OUTPATIENT
Start: 2023-02-22

## 2023-02-22 RX ADMIN — IPRATROPIUM BROMIDE AND ALBUTEROL SULFATE 1 AMPULE: .5; 3 SOLUTION RESPIRATORY (INHALATION) at 10:05

## 2023-02-22 ASSESSMENT — ENCOUNTER SYMPTOMS
RHINORRHEA: 1
COUGH: 1
DIARRHEA: 0
SHORTNESS OF BREATH: 1
NAUSEA: 0
SORE THROAT: 0
TROUBLE SWALLOWING: 0
CHEST TIGHTNESS: 1
VOMITING: 0
EYE DISCHARGE: 0
WHEEZING: 1
EYE REDNESS: 0

## 2023-02-22 ASSESSMENT — PAIN - FUNCTIONAL ASSESSMENT: PAIN_FUNCTIONAL_ASSESSMENT: NONE - DENIES PAIN

## 2023-02-22 NOTE — Clinical Note
Joselo Dobbins was seen and treated in our emergency department on 2/22/2023. She may return to work on 02/24/2023. If you have any questions or concerns, please don't hesitate to call.       Vicci Press, APRN - CNP

## 2023-02-22 NOTE — ED PROVIDER NOTES
[facility]  Urgent Care Encounter      CHIEF COMPLAINT       Chief Complaint   Patient presents with    COPD     \"Exacerbation\"    Shortness of Breath       Nurses Notes reviewed and I agree except as noted in the HPI. HISTORY OF PRESENT ILLNESS   Augustine Johns is a 49 y.o. female who presents for evaluation of possible COPD exacerbation. Onset of symptoms over the last 2 to 3 days, unchanged. Cough is intermittent, productive at times. Associated shortness of breath, chest tightness, wheezing. No chest pain, palpitations. No exposure to COVID, strep, flu. Patient believes that she has a COPD exacerbation. Patient needs refill for inhaler/nebulizer. Patient has done well with steroids in the past.    REVIEW OF SYSTEMS     Review of Systems   Constitutional:  Negative for chills, diaphoresis, fatigue and fever. HENT:  Positive for congestion, postnasal drip and rhinorrhea. Negative for ear pain, sore throat and trouble swallowing. Eyes:  Negative for discharge and redness. Respiratory:  Positive for cough, chest tightness, shortness of breath and wheezing. Cardiovascular:  Negative for chest pain. Gastrointestinal:  Negative for diarrhea, nausea and vomiting. Genitourinary:  Negative for decreased urine volume. Musculoskeletal:  Negative for neck pain and neck stiffness. Skin:  Negative for rash. Neurological:  Negative for headaches. Hematological:  Negative for adenopathy. Psychiatric/Behavioral:  Negative for sleep disturbance. PAST MEDICAL HISTORY         Diagnosis Date    Anxiety     Asthma     Bipolar 1 disorder (HCC)     COPD (chronic obstructive pulmonary disease) (HCC)     Depression     PTSD (post-traumatic stress disorder)        SURGICAL HISTORY     Patient  has a past surgical history that includes Tubal ligation.     CURRENT MEDICATIONS       Previous Medications    ATORVASTATIN (LIPITOR) 10 MG TABLET    Take 10 mg by mouth daily    BLOOD GLUCOSE MONITOR KIT AND SUPPLIES    Dispense sufficient amount for indicated testing frequency plus additional to accommodate PRN testing needs. Dispense all needed supplies to include: monitor, strips, lancing device, lancets, control solutions, alcohol swabs. DOXYCYCLINE HYCLATE (VIBRAMYCIN) 100 MG CAPSULE    Take 100 mg by mouth 2 times daily    LIRAGLUTIDE (VICTOZA) 18 MG/3ML SOPN SC INJECTION    Inject into the skin    UNIFINE PENTIPS 32G X 4 MM MISC    USE TO INJECT victoza UNDER THE SKIN ONCE A DAY    VICTOZA 18 MG/3ML SOPN SC INJECTION    INJECT 0.6 MG INTO THE SKIN ONCE DAILY       ALLERGIES     Patient is is allergic to blue dyes (parenteral) and penicillins. FAMILY HISTORY     Patient'sfamily history includes Asthma in her mother; COPD in her mother; Cancer in her maternal grandmother; Diabetes in her paternal grandmother. SOCIAL HISTORY     Patient  reports that she has been smoking cigarettes. She has a 12.50 pack-year smoking history. She has never used smokeless tobacco. She reports that she does not currently use drugs after having used the following drugs: Crack Cocaine. She reports that she does not drink alcohol. PHYSICAL EXAM     ED TRIAGE VITALS  BP: 123/70, Temp: 98.2 °F (36.8 °C), Heart Rate: 93, Resp: 20, SpO2: 96 %  Physical Exam  Vitals and nursing note reviewed. Constitutional:       General: She is not in acute distress. Appearance: Normal appearance. She is well-developed. She is not ill-appearing, toxic-appearing or diaphoretic. HENT:      Head: Normocephalic and atraumatic. Right Ear: Hearing, tympanic membrane, ear canal and external ear normal. No mastoid tenderness. No hemotympanum. Tympanic membrane is not perforated, erythematous or bulging. Left Ear: Hearing, tympanic membrane, ear canal and external ear normal. No mastoid tenderness. No hemotympanum. Tympanic membrane is not perforated, erythematous or bulging.       Nose: Nose normal. Mouth/Throat:      Mouth: Mucous membranes are moist.      Pharynx: Oropharynx is clear. Uvula midline. Tonsils: No tonsillar abscesses. Eyes:      General: No scleral icterus. Conjunctiva/sclera: Conjunctivae normal.      Right eye: Right conjunctiva is not injected. No hemorrhage. Left eye: Left conjunctiva is not injected. No hemorrhage. Neck:      Thyroid: No thyromegaly. Trachea: Trachea normal.   Cardiovascular:      Rate and Rhythm: Normal rate and regular rhythm. No extrasystoles are present. Chest Wall: PMI is not displaced. Heart sounds: Normal heart sounds. No murmur heard. No friction rub. No gallop. Pulmonary:      Effort: Pulmonary effort is normal. No respiratory distress. Breath sounds: Wheezing (diffuse expiratory) present. Musculoskeletal:      Cervical back: Normal range of motion and neck supple. Lymphadenopathy:      Head:      Right side of head: No submental, submandibular, tonsillar or occipital adenopathy. Left side of head: No submental, submandibular, tonsillar or occipital adenopathy. Cervical: No cervical adenopathy. Upper Body:      Right upper body: No supraclavicular adenopathy. Left upper body: No supraclavicular adenopathy. Skin:     General: Skin is warm and dry. Capillary Refill: Capillary refill takes less than 2 seconds. Coloration: Skin is not pale. Findings: No rash. Comments: Skin warm and dry to touch, no rashes noted on exposed surfaces. Neurological:      Mental Status: She is alert and oriented to person, place, and time. She is not disoriented. Psychiatric:         Mood and Affect: Mood normal.         Behavior: Behavior is cooperative. DIAGNOSTIC RESULTS   Labs:No results found for this visit on 02/22/23.     IMAGING:  No orders to display      URGENT CARE COURSE:     Vitals:    02/22/23 0953 02/22/23 1025   BP: 127/79 123/70   Pulse: (!) 101 93   Resp: 24 20   Temp: 98.2 °F (36.8 °C)    TempSrc: Oral    SpO2: 96% 96%   Weight: 300 lb (136.1 kg)        Medications   ipratropium-albuterol (DUONEB) nebulizer solution 1 ampule (1 ampule Inhalation Given 2/22/23 1005)     PROCEDURES:  None  FINAL IMPRESSION      1. COPD exacerbation ([facility] [address]        DISPOSITION/PLAN   DISPOSITION Decision To Discharge 02/22/2023 10:23:41 AM    Nontoxic, no distress. Patient has a diffuse expiratory wheeze, improved with DuoNeb. Medication as prescribed, monitor glucose. Refill medications. If symptoms worsen go to ER. PATIENT REFERRED TO:  PANTERA Ramos CNP  [address] [facility]  [address] [facility] [address]  [phone]      Follow-up with PCP as needed. Medications as prescribed. Continue current medications. Monitor glucose levels. If symptoms worsen go to ER. DISCHARGE MEDICATIONS:  New Prescriptions    ALBUTEROL (PROVENTIL) (2.5 MG/3ML) 0.083% NEBULIZER SOLUTION    Take 3 mLs by nebulization every 6 hours as needed for Wheezing    ALBUTEROL SULFATE HFA (VENTOLIN HFA) 108 (90 BASE) MCG/ACT INHALER    Inhale 2 puffs into the lungs every 6 hours as needed for Wheezing    PREDNISONE (DELTASONE) 10 MG TABLET    Take 2 tablets by mouth daily for 4 days, THEN 1 tablet daily for 3 days.      Current Discharge Medication List          PANTERA Bolivar CNP, APRN - CNP  02/22/23 4203

## 2023-02-22 NOTE — ED TRIAGE NOTES
Pt to room 5 with c/o SOB and possible exacerbation of COPD x 2 days. She reports that this happens every time the weather changes drastically like it has done in the last 2 days. She also reports that she has had to call in sick the last 2 days at work and is asking for a work note. Provider notified of SOB and increased respirations.

## 2023-03-19 ENCOUNTER — HOSPITAL ENCOUNTER (OUTPATIENT)
Dept: SLEEP CENTER | Age: 48
Discharge: HOME | End: 2023-03-19

## 2023-07-12 NOTE — PROGRESS NOTES
"Provided patient with phone number to [facility]. She wishes to go there temporarily and must call to discuss.

## 2024-06-17 LAB — MAMMOGRAPHY, EXTERNAL: NORMAL

## 2025-04-14 ENCOUNTER — HOSPITAL ENCOUNTER (EMERGENCY)
Age: 50
Discharge: HOME | End: 2025-04-14
Payer: COMMERCIAL

## 2025-04-14 VITALS
TEMPERATURE: 97.3 F | WEIGHT: 293 LBS | RESPIRATION RATE: 16 BRPM | DIASTOLIC BLOOD PRESSURE: 91 MMHG | OXYGEN SATURATION: 96 % | SYSTOLIC BLOOD PRESSURE: 136 MMHG | BODY MASS INDEX: 50.02 KG/M2 | HEART RATE: 86 BPM | HEIGHT: 64 IN

## 2025-04-14 DIAGNOSIS — R35.0 URINARY FREQUENCY: Primary | ICD-10-CM

## 2025-04-14 LAB
BILIRUB UR STRIP.AUTO-MCNC: NEGATIVE MG/DL
CHARACTER UR: CLEAR
COLOR, UA: YELLOW
GLUCOSE UR QL STRIP.AUTO: NEGATIVE MG/DL
KETONES UR QL STRIP.AUTO: ABNORMAL
NITRITE UR QL STRIP.AUTO: NEGATIVE
PH UR STRIP.AUTO: 6 [PH] (ref 5–9)
PROT UR STRIP.AUTO-MCNC: NEGATIVE MG/DL
RBC #/AREA URNS HPF: NEGATIVE /[HPF]
SP GR UR STRIP.AUTO: 1.02 (ref 1–1.03)
UROBILINOGEN, URINE: 0.2 EU/DL (ref 0.2–1)
WBC #/AREA URNS HPF: NEGATIVE /[HPF]

## 2025-04-14 PROCEDURE — 87086 URINE CULTURE/COLONY COUNT: CPT

## 2025-04-14 PROCEDURE — 99213 OFFICE O/P EST LOW 20 MIN: CPT

## 2025-04-14 PROCEDURE — 81003 URINALYSIS AUTO W/O SCOPE: CPT

## 2025-04-14 RX ORDER — NICOTINE 21 MG/24HR
1 PATCH, TRANSDERMAL 24 HOURS TRANSDERMAL EVERY 24 HOURS
COMMUNITY

## 2025-04-14 ASSESSMENT — ENCOUNTER SYMPTOMS
DIARRHEA: 0
ALLERGIC/IMMUNOLOGIC NEGATIVE: 1
GASTROINTESTINAL NEGATIVE: 1
RESPIRATORY NEGATIVE: 1
EYES NEGATIVE: 1
NAUSEA: 0
VOMITING: 0

## 2025-04-14 ASSESSMENT — PAIN DESCRIPTION - LOCATION: LOCATION: OTHER (COMMENT)

## 2025-04-14 ASSESSMENT — PAIN SCALES - GENERAL: PAINLEVEL_OUTOF10: 4

## 2025-04-14 ASSESSMENT — PAIN - FUNCTIONAL ASSESSMENT: PAIN_FUNCTIONAL_ASSESSMENT: 0-10

## 2025-04-14 ASSESSMENT — PAIN DESCRIPTION - DESCRIPTORS: DESCRIPTORS: PRESSURE

## 2025-04-14 NOTE — DISCHARGE INSTRUCTIONS
Can use over-the-counter Pyridium as needed.  Follow-up with family doctor call make an appointment for further evaluations for workup to rule out diabetes.  Go to the emergency room for any fever, malaise or any new or worsening symptoms.

## 2025-04-14 NOTE — ED PROVIDER NOTES
[facility] URGENT CARE  Urgent Care Encounter       CHIEF COMPLAINT       Chief Complaint   Patient presents with    Dysuria    Urinary Frequency     Urinary frequency urgency and pressure onset 4/14/25       Nurses Notes reviewed and I agree except as noted in the HPI.  HISTORY OF PRESENT ILLNESS   Kristan Johnson is a 49 y.o. female who presents to urgent care for dysuria and frequency.  Symptoms started 1 day ago.  States she has frequent thirst and frequent urination.  States diabetes runs in the family.  States her family doctor is no longer covered on her insurance.    The history is provided by the patient. No  was used.       REVIEW OF SYSTEMS     Review of Systems   Constitutional: Negative.  Negative for fever.   HENT: Negative.     Eyes: Negative.    Respiratory: Negative.     Cardiovascular: Negative.    Gastrointestinal: Negative.  Negative for diarrhea, nausea and vomiting.   Endocrine: Positive for polydipsia and polyuria.   Genitourinary:  Positive for dysuria and frequency.   Musculoskeletal: Negative.    Skin: Negative.    Allergic/Immunologic: Negative.    Neurological: Negative.    Hematological: Negative.    Psychiatric/Behavioral: Negative.         PAST MEDICAL HISTORY         Diagnosis Date    Anxiety     Asthma     Bipolar 1 disorder ([facility])     COPD (chronic obstructive pulmonary disease) ([facility])     Depression     PTSD (post-traumatic stress disorder)        SURGICALHISTORY     Patient  has a past surgical history that includes Tubal ligation.    CURRENT MEDICATIONS       Discharge Medication List as of 4/14/2025  7:40 PM        CONTINUE these medications which have NOT CHANGED    Details   nicotine (NICODERM CQ) 14 MG/24HR Place 1 patch onto the skin every 24 hoursHistorical Med      Phenazopyridine HCl (AZO-GESIC PO) Take 2 tablets by mouth as neededHistorical Med      albuterol sulfate HFA (VENTOLIN HFA) 108 (90 Base) MCG/ACT inhaler Inhale 2 puffs into the lungs

## 2025-04-16 LAB — BACTERIA UR CULT: NORMAL

## 2025-04-22 SDOH — HEALTH STABILITY: PHYSICAL HEALTH: ON AVERAGE, HOW MANY MINUTES DO YOU ENGAGE IN EXERCISE AT THIS LEVEL?: 30 MIN

## 2025-04-22 SDOH — HEALTH STABILITY: PHYSICAL HEALTH: ON AVERAGE, HOW MANY DAYS PER WEEK DO YOU ENGAGE IN MODERATE TO STRENUOUS EXERCISE (LIKE A BRISK WALK)?: 3 DAYS

## 2025-04-23 ENCOUNTER — RESULTS FOLLOW-UP (OUTPATIENT)
Dept: FAMILY MEDICINE CLINIC | Age: 50
End: 2025-04-23

## 2025-04-23 ENCOUNTER — OFFICE VISIT (OUTPATIENT)
Dept: FAMILY MEDICINE CLINIC | Age: 50
End: 2025-04-23
Payer: COMMERCIAL

## 2025-04-23 VITALS
TEMPERATURE: 98.2 F | SYSTOLIC BLOOD PRESSURE: 128 MMHG | WEIGHT: 293 LBS | RESPIRATION RATE: 18 BRPM | HEIGHT: 64 IN | BODY MASS INDEX: 50.02 KG/M2 | DIASTOLIC BLOOD PRESSURE: 64 MMHG | HEART RATE: 76 BPM

## 2025-04-23 DIAGNOSIS — F14.91 COCAINE USE DISORDER IN REMISSION: ICD-10-CM

## 2025-04-23 DIAGNOSIS — Z11.59 NEED FOR HEPATITIS C SCREENING TEST: ICD-10-CM

## 2025-04-23 DIAGNOSIS — R73.09 ELEVATED GLUCOSE: ICD-10-CM

## 2025-04-23 DIAGNOSIS — Z11.4 ENCOUNTER FOR SCREENING FOR HIV: ICD-10-CM

## 2025-04-23 DIAGNOSIS — F41.9 ANXIETY: ICD-10-CM

## 2025-04-23 DIAGNOSIS — L40.9 PSORIASIS: ICD-10-CM

## 2025-04-23 DIAGNOSIS — E78.2 MIXED HYPERLIPIDEMIA: ICD-10-CM

## 2025-04-23 DIAGNOSIS — F43.10 PTSD (POST-TRAUMATIC STRESS DISORDER): ICD-10-CM

## 2025-04-23 DIAGNOSIS — Z12.11 COLON CANCER SCREENING: ICD-10-CM

## 2025-04-23 DIAGNOSIS — E11.9 TYPE 2 DIABETES MELLITUS WITHOUT COMPLICATION, WITHOUT LONG-TERM CURRENT USE OF INSULIN (HCC): Primary | ICD-10-CM

## 2025-04-23 DIAGNOSIS — F31.9 BIPOLAR 1 DISORDER (HCC): ICD-10-CM

## 2025-04-23 DIAGNOSIS — F17.200 TOBACCO USE DISORDER: ICD-10-CM

## 2025-04-23 DIAGNOSIS — J44.9 CHRONIC OBSTRUCTIVE PULMONARY DISEASE, UNSPECIFIED COPD TYPE (HCC): ICD-10-CM

## 2025-04-23 LAB — HBA1C MFR BLD: 6.5 %

## 2025-04-23 PROCEDURE — 3023F SPIROM DOC REV: CPT | Performed by: STUDENT IN AN ORGANIZED HEALTH CARE EDUCATION/TRAINING PROGRAM

## 2025-04-23 PROCEDURE — 4004F PT TOBACCO SCREEN RCVD TLK: CPT | Performed by: STUDENT IN AN ORGANIZED HEALTH CARE EDUCATION/TRAINING PROGRAM

## 2025-04-23 PROCEDURE — 99214 OFFICE O/P EST MOD 30 MIN: CPT | Performed by: STUDENT IN AN ORGANIZED HEALTH CARE EDUCATION/TRAINING PROGRAM

## 2025-04-23 PROCEDURE — G8427 DOCREV CUR MEDS BY ELIG CLIN: HCPCS | Performed by: STUDENT IN AN ORGANIZED HEALTH CARE EDUCATION/TRAINING PROGRAM

## 2025-04-23 PROCEDURE — 3017F COLORECTAL CA SCREEN DOC REV: CPT | Performed by: STUDENT IN AN ORGANIZED HEALTH CARE EDUCATION/TRAINING PROGRAM

## 2025-04-23 PROCEDURE — G2211 COMPLEX E/M VISIT ADD ON: HCPCS | Performed by: STUDENT IN AN ORGANIZED HEALTH CARE EDUCATION/TRAINING PROGRAM

## 2025-04-23 PROCEDURE — 2022F DILAT RTA XM EVC RTNOPTHY: CPT | Performed by: STUDENT IN AN ORGANIZED HEALTH CARE EDUCATION/TRAINING PROGRAM

## 2025-04-23 PROCEDURE — 3046F HEMOGLOBIN A1C LEVEL >9.0%: CPT | Performed by: STUDENT IN AN ORGANIZED HEALTH CARE EDUCATION/TRAINING PROGRAM

## 2025-04-23 PROCEDURE — 83036 HEMOGLOBIN GLYCOSYLATED A1C: CPT | Performed by: STUDENT IN AN ORGANIZED HEALTH CARE EDUCATION/TRAINING PROGRAM

## 2025-04-23 PROCEDURE — G8417 CALC BMI ABV UP PARAM F/U: HCPCS | Performed by: STUDENT IN AN ORGANIZED HEALTH CARE EDUCATION/TRAINING PROGRAM

## 2025-04-23 RX ORDER — ALBUTEROL SULFATE 0.83 MG/ML
2.5 SOLUTION RESPIRATORY (INHALATION) EVERY 6 HOURS PRN
Qty: 120 EACH | Refills: 3 | Status: SHIPPED | OUTPATIENT
Start: 2025-04-23

## 2025-04-23 RX ORDER — TRIAMCINOLONE ACETONIDE 1 MG/G
OINTMENT TOPICAL 2 TIMES DAILY
Qty: 30 G | Refills: 0 | Status: SHIPPED | OUTPATIENT
Start: 2025-04-23 | End: 2025-04-30

## 2025-04-23 SDOH — ECONOMIC STABILITY: FOOD INSECURITY: WITHIN THE PAST 12 MONTHS, THE FOOD YOU BOUGHT JUST DIDN'T LAST AND YOU DIDN'T HAVE MONEY TO GET MORE.: NEVER TRUE

## 2025-04-23 SDOH — ECONOMIC STABILITY: FOOD INSECURITY: WITHIN THE PAST 12 MONTHS, YOU WORRIED THAT YOUR FOOD WOULD RUN OUT BEFORE YOU GOT MONEY TO BUY MORE.: NEVER TRUE

## 2025-04-23 ASSESSMENT — ENCOUNTER SYMPTOMS
WHEEZING: 1
ABDOMINAL PAIN: 0
VOMITING: 0
CONSTIPATION: 0
BACK PAIN: 0
DIARRHEA: 0
NAUSEA: 0
COUGH: 0
SHORTNESS OF BREATH: 1

## 2025-04-23 ASSESSMENT — PATIENT HEALTH QUESTIONNAIRE - PHQ9
1. LITTLE INTEREST OR PLEASURE IN DOING THINGS: NOT AT ALL
9. THOUGHTS THAT YOU WOULD BE BETTER OFF DEAD, OR OF HURTING YOURSELF: NOT AT ALL
4. FEELING TIRED OR HAVING LITTLE ENERGY: NOT AT ALL
5. POOR APPETITE OR OVEREATING: NOT AT ALL
3. TROUBLE FALLING OR STAYING ASLEEP: NOT AT ALL
SUM OF ALL RESPONSES TO PHQ QUESTIONS 1-9: 0
8. MOVING OR SPEAKING SO SLOWLY THAT OTHER PEOPLE COULD HAVE NOTICED. OR THE OPPOSITE, BEING SO FIGETY OR RESTLESS THAT YOU HAVE BEEN MOVING AROUND A LOT MORE THAN USUAL: NOT AT ALL
2. FEELING DOWN, DEPRESSED OR HOPELESS: NOT AT ALL
7. TROUBLE CONCENTRATING ON THINGS, SUCH AS READING THE NEWSPAPER OR WATCHING TELEVISION: NOT AT ALL
10. IF YOU CHECKED OFF ANY PROBLEMS, HOW DIFFICULT HAVE THESE PROBLEMS MADE IT FOR YOU TO DO YOUR WORK, TAKE CARE OF THINGS AT HOME, OR GET ALONG WITH OTHER PEOPLE: NOT DIFFICULT AT ALL
SUM OF ALL RESPONSES TO PHQ QUESTIONS 1-9: 0
6. FEELING BAD ABOUT YOURSELF - OR THAT YOU ARE A FAILURE OR HAVE LET YOURSELF OR YOUR FAMILY DOWN: NOT AT ALL

## 2025-04-23 NOTE — PROGRESS NOTES
Kristan Johnson is a 50 y.o. 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    Patient has not been seeing any provider recently.  She is currently 2 years sober from a history of crack cocaine use.  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    She has a history of prediabetes.  Per chart review, she was on Victoza previously, states this was stopped as she was concerned about potential long-term side effects.  No known diagnosis of diabetes, but she is worried about this and would like this checked today.  A1c in office is 6.5, new diagnosis of type 2 diabetes. Dietary changes include reducing carbohydrate intake, focusing on consuming chicken breasts, tuna, steak, frozen fruits and vegetables, and drinking water.    She has a history of COPD, uses albuterol inhaler and nebulizer as needed.  Is requesting refill of this today.  Typically, her breathing gets worse when she is sick or when the weather changes, otherwise she does not use inhalers regularly.    She has noticed weight gain, particularly after menopause, which began approximately 3 to 4 years ago. She has gained 60 pounds during her recovery period over the past 2 years. Current weight is 300 pounds. Efforts to quit smoking have been made, but there is fear of further weight gain. Nicotine patches are used intermittently, with the last use being a week ago. Gum and lozenges have been tried but were found unpalatable.     A diagnosis of bipolar disorder led to 10 years of medication use. A request to be taken off all medications for 2 months was made to assess her condition. Anxiety is managed with healthy coping skills, without medication. PTSD has been diagnosed.

## 2025-05-01 ENCOUNTER — TELEPHONE (OUTPATIENT)
Dept: FAMILY MEDICINE CLINIC | Age: 50
End: 2025-05-01

## 2025-05-01 NOTE — TELEPHONE ENCOUNTER
[facility] sent us a fax to let us know the records we requested should already be in [facility] since they use care everywhere. I did look in patient's chart and there are records from them in there.

## 2025-06-10 ENCOUNTER — HOSPITAL ENCOUNTER (EMERGENCY)
Age: 50
Discharge: HOME | End: 2025-06-10
Payer: COMMERCIAL

## 2025-06-10 VITALS
WEIGHT: 293 LBS | SYSTOLIC BLOOD PRESSURE: 120 MMHG | BODY MASS INDEX: 50.02 KG/M2 | HEART RATE: 72 BPM | TEMPERATURE: 97.1 F | DIASTOLIC BLOOD PRESSURE: 86 MMHG | RESPIRATION RATE: 16 BRPM | HEIGHT: 64 IN | OXYGEN SATURATION: 96 %

## 2025-06-10 DIAGNOSIS — Z20.2 POSSIBLE EXPOSURE TO STD: Primary | ICD-10-CM

## 2025-06-10 LAB
BILIRUB UR STRIP.AUTO-MCNC: NEGATIVE MG/DL
CHARACTER UR: NORMAL
COLOR, UA: YELLOW
GLUCOSE UR QL STRIP.AUTO: NEGATIVE MG/DL
KETONES UR QL STRIP.AUTO: NEGATIVE
NITRITE UR QL STRIP.AUTO: NEGATIVE
PH UR STRIP.AUTO: 7.5 [PH] (ref 5–9)
PROT UR STRIP.AUTO-MCNC: NEGATIVE MG/DL
RBC #/AREA URNS HPF: NEGATIVE /[HPF]
SP GR UR STRIP.AUTO: 1.02 (ref 1–1.03)
T VAGINALIS AG GENITAL QL IA: NEGATIVE
UROBILINOGEN, URINE: 0.2 EU/DL (ref 0.2–1)
WBC #/AREA URNS HPF: NEGATIVE /[HPF]

## 2025-06-10 PROCEDURE — 87070 CULTURE OTHR SPECIMN AEROBIC: CPT

## 2025-06-10 PROCEDURE — 99213 OFFICE O/P EST LOW 20 MIN: CPT

## 2025-06-10 PROCEDURE — 87491 CHLMYD TRACH DNA AMP PROBE: CPT

## 2025-06-10 PROCEDURE — 87808 TRICHOMONAS ASSAY W/OPTIC: CPT

## 2025-06-10 PROCEDURE — 87591 N.GONORRHOEAE DNA AMP PROB: CPT

## 2025-06-10 PROCEDURE — 87205 SMEAR GRAM STAIN: CPT

## 2025-06-10 PROCEDURE — 81003 URINALYSIS AUTO W/O SCOPE: CPT

## 2025-06-10 ASSESSMENT — PAIN SCALES - GENERAL: PAINLEVEL_OUTOF10: 0

## 2025-06-10 ASSESSMENT — ENCOUNTER SYMPTOMS
RESPIRATORY NEGATIVE: 1
GASTROINTESTINAL NEGATIVE: 1

## 2025-06-10 ASSESSMENT — PAIN - FUNCTIONAL ASSESSMENT: PAIN_FUNCTIONAL_ASSESSMENT: 0-10

## 2025-06-10 NOTE — DISCHARGE INSTRUCTIONS
Your trichomonas result was negative.  The rest of your results should return within 3 to 5 days.  We will call you with the positive result.  You should follow-up with health department to have more in-depth testing.  Go to the emergency department for any other symptoms or concerns deemed emergent.

## 2025-06-10 NOTE — ED NOTES
To bathroom to collect urine sample and self swab red top vaginal swab.      Maxine Rodas, RN  06/10/25 [id number]

## 2025-06-10 NOTE — ED PROVIDER NOTES
[facility] URGENT CARE  UrgentCare Encounter      CHIEFCOMPLAINT       Chief Complaint   Patient presents with    OTHER       Nurses Notes reviewed and I agree except as noted in the HPI.  HISTORY OF PRESENT ILLNESS   Kristan Johnson is a 50 y.o. female who presents today for STD check.  Patient states she had been celibate for approximately 1 year but she recently had unprotected sex with her ex.  She found out a week ago that he had trichomonas.  States she now would like checked.  Patient denies any symptoms.    REVIEW OF SYSTEMS     Review of Systems   Constitutional: Negative.    HENT: Negative.     Respiratory: Negative.     Cardiovascular: Negative.    Gastrointestinal: Negative.    Genitourinary: Negative.    Neurological: Negative.    Psychiatric/Behavioral: Negative.         PAST MEDICAL HISTORY         Diagnosis Date    Anxiety     Asthma     Bipolar 1 disorder ([facility])     COPD (chronic obstructive pulmonary disease) ([facility])     Depression     PTSD (post-traumatic stress disorder)        SURGICAL HISTORY     Patient  has a past surgical history that includes Tubal ligation.    CURRENT MEDICATIONS       Discharge Medication List as of 6/10/2025 12:53 PM        CONTINUE these medications which have NOT CHANGED    Details   albuterol (PROVENTIL) (2.5 MG/3ML) 0.083% nebulizer solution Take 3 mLs by nebulization every 6 hours as needed for Wheezing, Disp-120 each, R-3Normal      metFORMIN (GLUCOPHAGE) 500 MG tablet Take 1 tablet by mouth 2 times daily (with meals), Disp-180 tablet, R-1Normal      albuterol sulfate HFA (VENTOLIN HFA) 108 (90 Base) MCG/ACT inhaler Inhale 2 puffs into the lungs every 6 hours as needed for Wheezing, Disp-18 g, R-3Normal      blood glucose monitor kit and supplies Dispense sufficient amount for indicated testing frequency plus additional to accommodate PRN testing needs. Dispense all needed supplies to include: monitor, strips, lancing device, lancets, control solutions, alcohol

## 2025-06-11 LAB
CHLAMYDIA DNA UR QL NAA+PROBE: NEGATIVE
CHLAMYDIA, GC DNA AMP, URINE: NORMAL
N GONORRHOEA DNA UR QL NAA+PROBE: NEGATIVE

## 2025-06-13 LAB
BACTERIA GENITAL AEROBE CULT: NORMAL
GRAM STN GENITAL: NORMAL

## 2025-07-15 RX ORDER — SODIUM CHLORIDE 9 MG/ML
INJECTION, SOLUTION INTRAVENOUS CONTINUOUS
Status: CANCELLED | OUTPATIENT
Start: 2025-07-15

## 2025-07-16 ENCOUNTER — ANESTHESIA EVENT (OUTPATIENT)
Dept: ENDOSCOPY | Age: 50
End: 2025-07-16
Payer: COMMERCIAL

## 2025-07-16 ENCOUNTER — APPOINTMENT (OUTPATIENT)
Dept: ENDOSCOPY | Age: 50
End: 2025-07-16
Attending: INTERNAL MEDICINE
Payer: COMMERCIAL

## 2025-07-16 ENCOUNTER — HOSPITAL ENCOUNTER (OUTPATIENT)
Age: 50
Setting detail: OUTPATIENT SURGERY
Discharge: HOME | End: 2025-07-16
Attending: INTERNAL MEDICINE | Admitting: INTERNAL MEDICINE
Payer: COMMERCIAL

## 2025-07-16 ENCOUNTER — ANESTHESIA (OUTPATIENT)
Dept: ENDOSCOPY | Age: 50
End: 2025-07-16
Payer: COMMERCIAL

## 2025-07-16 VITALS
HEIGHT: 64 IN | SYSTOLIC BLOOD PRESSURE: 125 MMHG | BODY MASS INDEX: 50.02 KG/M2 | TEMPERATURE: 97.1 F | RESPIRATION RATE: 16 BRPM | WEIGHT: 293 LBS | HEART RATE: 66 BPM | DIASTOLIC BLOOD PRESSURE: 79 MMHG | OXYGEN SATURATION: 97 %

## 2025-07-16 PROCEDURE — 6360000002 HC RX W HCPCS: Performed by: NURSE ANESTHETIST, CERTIFIED REGISTERED

## 2025-07-16 PROCEDURE — 88305 TISSUE EXAM BY PATHOLOGIST: CPT

## 2025-07-16 PROCEDURE — 3700000001 HC ADD 15 MINUTES (ANESTHESIA): Performed by: INTERNAL MEDICINE

## 2025-07-16 PROCEDURE — 7100000010 HC PHASE II RECOVERY - FIRST 15 MIN: Performed by: INTERNAL MEDICINE

## 2025-07-16 PROCEDURE — 3609012400 HC EGD TRANSORAL BIOPSY SINGLE/MULTIPLE: Performed by: INTERNAL MEDICINE

## 2025-07-16 PROCEDURE — 3700000000 HC ANESTHESIA ATTENDED CARE: Performed by: INTERNAL MEDICINE

## 2025-07-16 PROCEDURE — 2709999900 HC NON-CHARGEABLE SUPPLY: Performed by: INTERNAL MEDICINE

## 2025-07-16 PROCEDURE — 7100000011 HC PHASE II RECOVERY - ADDTL 15 MIN: Performed by: INTERNAL MEDICINE

## 2025-07-16 PROCEDURE — 2580000003 HC RX 258: Performed by: NURSE ANESTHETIST, CERTIFIED REGISTERED

## 2025-07-16 PROCEDURE — 3609010600 HC COLONOSCOPY POLYPECTOMY SNARE/COLD BIOPSY: Performed by: INTERNAL MEDICINE

## 2025-07-16 PROCEDURE — 88342 IMHCHEM/IMCYTCHM 1ST ANTB: CPT

## 2025-07-16 PROCEDURE — 3609010300 HC COLONOSCOPY W/BIOPSY SINGLE/MULTIPLE: Performed by: INTERNAL MEDICINE

## 2025-07-16 PROCEDURE — 2720000010 HC SURG SUPPLY STERILE: Performed by: INTERNAL MEDICINE

## 2025-07-16 RX ORDER — SODIUM CHLORIDE 9 MG/ML
INJECTION, SOLUTION INTRAVENOUS
Status: DISCONTINUED | OUTPATIENT
Start: 2025-07-16 | End: 2025-07-16 | Stop reason: SDUPTHER

## 2025-07-16 RX ORDER — LIDOCAINE HYDROCHLORIDE 20 MG/ML
INJECTION, SOLUTION INTRAVENOUS
Status: DISCONTINUED | OUTPATIENT
Start: 2025-07-16 | End: 2025-07-16 | Stop reason: SDUPTHER

## 2025-07-16 RX ORDER — PROPOFOL 10 MG/ML
INJECTION, EMULSION INTRAVENOUS
Status: DISCONTINUED | OUTPATIENT
Start: 2025-07-16 | End: 2025-07-16 | Stop reason: SDUPTHER

## 2025-07-16 RX ADMIN — SODIUM CHLORIDE: 9 INJECTION, SOLUTION INTRAVENOUS at 09:48

## 2025-07-16 RX ADMIN — LIDOCAINE HYDROCHLORIDE 100 MG: 20 INJECTION, SOLUTION INTRAVENOUS at 09:52

## 2025-07-16 RX ADMIN — PROPOFOL 25 MG: 10 INJECTION, EMULSION INTRAVENOUS at 10:08

## 2025-07-16 RX ADMIN — PROPOFOL 25 MG: 10 INJECTION, EMULSION INTRAVENOUS at 10:06

## 2025-07-16 RX ADMIN — PROPOFOL 50 MG: 10 INJECTION, EMULSION INTRAVENOUS at 09:52

## 2025-07-16 RX ADMIN — PROPOFOL 25 MG: 10 INJECTION, EMULSION INTRAVENOUS at 10:01

## 2025-07-16 RX ADMIN — PROPOFOL 25 MG: 10 INJECTION, EMULSION INTRAVENOUS at 09:58

## 2025-07-16 RX ADMIN — PROPOFOL 25 MG: 10 INJECTION, EMULSION INTRAVENOUS at 09:54

## 2025-07-16 RX ADMIN — PROPOFOL 25 MG: 10 INJECTION, EMULSION INTRAVENOUS at 09:56

## 2025-07-16 RX ADMIN — PROPOFOL 25 MG: 10 INJECTION, EMULSION INTRAVENOUS at 10:04

## 2025-07-16 ASSESSMENT — LIFESTYLE VARIABLES: SMOKING_STATUS: 1

## 2025-07-16 ASSESSMENT — PAIN - FUNCTIONAL ASSESSMENT
PAIN_FUNCTIONAL_ASSESSMENT: 0-10
PAIN_FUNCTIONAL_ASSESSMENT: 0-10
PAIN_FUNCTIONAL_ASSESSMENT: NONE - DENIES PAIN

## 2025-07-16 ASSESSMENT — PAIN DESCRIPTION - DESCRIPTORS: DESCRIPTORS: CRAMPING

## 2025-07-16 NOTE — PROGRESS NOTES
EGD completed, biopsies taken, 2 jars labeled for lab.       Colonoscopy completed.  Polyps removed via cold snare, labeled for lab. Bx taken, labeled for lab.   Pictures taken. Pt tolerated well.     4 jars total sent to lab.      Scope  Used.

## 2025-07-16 NOTE — ANESTHESIA POSTPROCEDURE EVALUATION
Department of Anesthesiology  Postprocedure Note    Patient: Kristan Johnson  MRN: [id number]  YOB: 1975  Date of evaluation: 7/16/2025    Procedure Summary       Date: 07/16/25 Room / Location: [place] [address] / [facility]    Anesthesia Start: 0950 Anesthesia Stop: 1014    Procedures:       COLONOSCOPY POLYPECTOMY SNARE/BIOPSY      ESOPHAGOGASTRODUODENOSCOPY BIOPSY      COLONOSCOPY BIOPSY Diagnosis:       Right upper quadrant pain      Left upper quadrant pain      (Right upper quadrant pain [R10.11])      (Left upper quadrant pain [R10.12])    Surgeons: Charles Ballard MD Responsible Provider: Sorin Varner MD    Anesthesia Type: MAC ASA Status: 3            Anesthesia Type: No value filed.    Lauren Phase I: Lauren Score: 10    Lauren Phase II:      Anesthesia Post Evaluation    Patient location during evaluation: bedside  Patient participation: complete - patient participated  Level of consciousness: awake and alert  Airway patency: patent  Nausea & Vomiting: no vomiting and no nausea  Cardiovascular status: hemodynamically stable  Respiratory status: acceptable and room air  Hydration status: stable  Pain management: satisfactory to patient and adequate        No notable events documented.

## 2025-07-16 NOTE — H&P
[address] [address]                            PREOPERATIVE H&P      PATIENT NAME: KELLEN RITCHIE               : 1975  MED REC NO: [id number]                       ROOM: [facility]  ACCOUNT NO: [id number]                       ADMIT DATE: 2025  PROVIDER: Padmini Ballard MD      DATE OF PROCEDURE:  2025.    PROCEDURE:  Esophagogastroduodenoscopy and colonoscopy.    INDICATION:  The patient is a 50-year-old pleasant female, who complains of diarrhea and change in bowel habits, abdominal pain.  Pain is cramping type of pain.  Denied any nausea or vomiting.  No significant weight changes.  The patient is undergoing further evaluation.    PAST MEDICAL HISTORY:  Anxiety, asthma, bipolar disorder, cancer, cerebral artery occlusion with cerebral infarction, chronic obstructive pulmonary disease, depression, diabetes mellitus, post-traumatic stress disorder.    PAST SURGICAL HISTORY:  Tubal ligation.    MEDICATIONS:  Reviewed.    PHYSICAL EXAMINATION:  VITAL SIGNS:  Temperature 97.9, pulse 80, respiratory rate 16, blood pressure 128/95.   HEART:  Regular.  Normal S1 and S2.  No S3.  LUNGS:  Fair air entry bilaterally.  ABDOMEN:  Soft.  Normoactive bowel sounds.  Nontender.  Not distended.    IMPRESSION:  50-year-old pleasant female, who complains of diarrhea and abdominal pain.  She is undergoing esophagogastroduodenoscopy and colonoscopy.    RECOMMENDATIONS:  Keep the patient nothing by mouth.  Proceed with EGD and colonoscopy as planned.  The risks including, but not limited to, perforation, bleeding, infection, adverse reaction to medicine, very slight chance of missing significant lesions.  The patient expressed her understanding.  Further plans pending the above test results.        PADMINI BALLARD MD    D:  2025 09:45:07     T:  2025 10:24:00     MARCIE/PREETHI  Job #:  [id number]     Doc#:  [id number]

## 2025-07-16 NOTE — PROCEDURES
[address] [address]                             PROCEDURE NOTE      PATIENT NAME: KELLEN IRTCHIE               : 1975  MED REC NO: [id number]                       ROOM: [facility]  ACCOUNT NO: [id number]                       ADMIT DATE: 2025  PROVIDER: Charles Ballard MD      DATE OF PROCEDURE:  2025    SURGEON:  Charles Ballard MD    PROCEDURE:  Esophagogastroduodenoscopy and colonoscopy.    INDICATION:  50-year-old pleasant female with abdominal pain and diarrhea.  She is undergoing further evaluation.  Please see my brief history for details and for physical examination.    ASA CLASSIFICATION:  As per Anesthesia.  Please see Anesthesia note for details.    INSTRUMENT:  PCF-H190L pediatric colonoscope.    PHOTOGRAPHS:  Yes.    BIOPSIES:  Yes.    SNARE:  Yes.    ESTIMATED BLOOD LOSS:  Less than 10 mL.    Procedure indications and complications including, but not limited to, perforation, bleeding, infection, adverse reaction to medicine, very slight chance of missing significant lesions discussed with the patient, and patient expressed her understanding, and a written consent was obtained.    DESCRIPTION OF PROCEDURE:  Esophagogastroduodenoscopy report:  Oropharynx was sprayed with Cetacaine spray, and bite block was placed between maxilla and mandible.  After the adequate total intravenous anesthesia was given by Anesthesia, the PCF-H190L pediatric colonoscope was inserted into the oropharynx, and the esophagus was intubated under direct vision.  The scope was advanced down through the stomach into the 2nd portion of duodenum.  On careful inspection and on withdrawal of the scope, the duodenum looks normal as shown in picture #A4 and A5.  Multiple biopsies obtained to rule out malabsorption syndrome.  In the duodenal bulb, the patient had diffuse patchy duodenitis noted as shown in picture #A3.  Mild patchy

## 2025-07-16 NOTE — ANESTHESIA PRE PROCEDURE
Department of Anesthesiology  Preprocedure Note       Name:  Kristan Johnson   Age:  50 y.o.  :  1975                                          MRN:  [id number]         Date:  2025      Surgeon: Surgeon(s):  Charles Ballard MD    Procedure: Procedure(s):  COLONOSCOPY  EGD    Medications prior to admission:   Prior to Admission medications    Medication Sig Start Date End Date Taking? Authorizing Provider   albuterol (PROVENTIL) (2.5 MG/3ML) 0.083% nebulizer solution Take 3 mLs by nebulization every 6 hours as needed for Wheezing 25   Corrina Cummings MD   blood glucose monitor kit and supplies Dispense sufficient amount for indicated testing frequency plus additional to accommodate PRN testing needs. Dispense all needed supplies to include: monitor, strips, lancing device, lancets, control solutions, alcohol swabs.  Patient not taking: Reported on 6/10/2025 4/23/25   Corrina Cummings MD   metFORMIN (GLUCOPHAGE) 500 MG tablet Take 1 tablet by mouth 2 times daily (with meals) 25   Corrina Cummings MD   nicotine (NICODERM CQ) 14 MG/24HR Place 1 patch onto the skin every 24 hours  Patient not taking: Reported on 2025    Provider, MD Toni   albuterol sulfate HFA (VENTOLIN HFA) 108 (90 Base) MCG/ACT inhaler Inhale 2 puffs into the lungs every 6 hours as needed for Wheezing 23   Gio Campos, APRN - CNP       Current medications:    No current facility-administered medications for this encounter.       Allergies:    Allergies   Allergen Reactions    Blue Dyes (Parenteral) Anaphylaxis    Penicillins Nausea And Vomiting       Problem List:    Patient Active Problem List   Diagnosis Code    Drug psychosis, with unspecified complication ([facility]) F19.959    Weakness of right side of body R53.1    Bipolar disorder, curr episode depressed, severe, w/psychotic features ([facility]) F31.5       Past Medical History:        Diagnosis Date    Anxiety     Asthma     Bipolar 1 disorder ([facility])

## 2025-07-16 NOTE — BRIEF OP NOTE
Brief Postoperative Note      Patient: Kristan Johnson  YOB: 1975  MRN: [id number]    Date of Procedure: 7/16/2025    Pre-Op Diagnosis Codes:      * Right upper quadrant pain [R10.11]     * Left upper quadrant pain [R10.12]    Post-Op Diagnosis: Post-Op Diagnosis Codes:     * Right upper quadrant pain [R10.11]     * Left upper quadrant pain [R10.12]       Procedure(s):  COLONOSCOPY POLYPECTOMY SNARE/BIOPSY  ESOPHAGOGASTRODUODENOSCOPY BIOPSY  COLONOSCOPY BIOPSY    Surgeon(s):  Charles Ballard MD    Assistant:  * No surgical staff found *    Anesthesia: Monitor Anesthesia Care    Estimated Blood Loss (mL): Minimal    Complications: None    Specimens:   ID Type Source Tests Collected by Time Destination   A : small bowel bx Tissue Duodenum SURGICAL PATHOLOGY Charles Ballard MD 7/16/2025 0956    B : bx antrum, check for h-pylori Tissue Stomach SURGICAL PATHOLOGY Charles Ballard MD 7/16/2025 0957    C : random colon bx Tissue Colon SURGICAL PATHOLOGY Charles Ballard MD 7/16/2025 1003    D : random colon polyps - 3 Tissue Colon SURGICAL PATHOLOGY Charles Ballard MD 7/16/2025 1009        Implants:  * No implants in log *      Drains: * No LDAs found *    Findings:  Present At Time Of Surgery (PATOS) (choose all levels that have infection present):  No infection present  Other Findings: duodenitis bx clotest, small bowel bx, colonoscopy to terminal ileum 3 polyp s/p snare.    Electronically signed by Charles Ballard MD on 7/16/2025 at 10:20 AM

## 2025-07-16 NOTE — DISCHARGE INSTRUCTIONS
ANTI-REFLUX INSTRUCTIONS  HIGH FIBER DIET  FIBER SUPPLEMENTS  FOLLOW UP COLON IN 5 YRS  F/U WITH US IN 4-5 WKS.

## 2025-07-16 NOTE — PROGRESS NOTES
Admitted to Endo department and admitted to Endo room 4  Plan of care reviewed with patient.   Call light within reach.   Allergies reviewed with pt   Bed in lowest position, locked, with one bed rail up.   Appropriate arm bands on patient.   Bathroom offered.   All questions and concerns of patient addressed    Name: Yarelis  Relationship to patient: janina   Phone number: [phone]

## 2025-07-30 ENCOUNTER — APPOINTMENT (OUTPATIENT)
Dept: CT IMAGING | Age: 50
End: 2025-07-30
Payer: COMMERCIAL

## 2025-07-30 ENCOUNTER — HOSPITAL ENCOUNTER (OUTPATIENT)
Age: 50
Setting detail: OBSERVATION
Discharge: HOME | End: 2025-08-01
Attending: EMERGENCY MEDICINE
Payer: COMMERCIAL

## 2025-07-30 ENCOUNTER — APPOINTMENT (OUTPATIENT)
Dept: MRI IMAGING | Age: 50
End: 2025-07-30
Payer: COMMERCIAL

## 2025-07-30 ENCOUNTER — APPOINTMENT (OUTPATIENT)
Dept: GENERAL RADIOLOGY | Age: 50
End: 2025-07-30
Payer: COMMERCIAL

## 2025-07-30 DIAGNOSIS — R42 VERTIGO: Primary | ICD-10-CM

## 2025-07-30 LAB
ALBUMIN SERPL BCG-MCNC: 4.3 G/DL (ref 3.4–4.9)
ALP SERPL-CCNC: 87 U/L (ref 38–126)
ALT SERPL W/O P-5'-P-CCNC: 84 U/L (ref 10–35)
AMPHETAMINES UR QL SCN: NEGATIVE
ANION GAP SERPL CALC-SCNC: 11 MEQ/L (ref 8–16)
APTT PPP: 33.9 SECONDS (ref 22–38)
AST SERPL-CCNC: 40 U/L (ref 10–35)
BARBITURATES UR QL SCN: NEGATIVE
BASOPHILS ABSOLUTE: 0 THOU/MM3 (ref 0–0.1)
BASOPHILS NFR BLD AUTO: 0.7 %
BENZODIAZ UR QL SCN: NEGATIVE
BILIRUB SERPL-MCNC: 0.4 MG/DL (ref 0.3–1.2)
BILIRUB UR QL STRIP.AUTO: NEGATIVE
BUN SERPL-MCNC: 12 MG/DL (ref 8–23)
BUPRENORPHINE URINE: NEGATIVE
BZE UR QL SCN: NEGATIVE
CALCIUM SERPL-MCNC: 9.7 MG/DL (ref 8.6–10)
CANNABINOIDS UR QL SCN: NEGATIVE
CHARACTER UR: CLEAR
CHLORIDE SERPL-SCNC: 107 MEQ/L (ref 98–111)
CHOLEST SERPL-MCNC: 157 MG/DL (ref 100–199)
CO2 SERPL-SCNC: 24 MEQ/L (ref 22–29)
COLOR, UA: YELLOW
CREAT SERPL-MCNC: 0.8 MG/DL (ref 0.5–0.9)
DEPRECATED MEAN GLUCOSE BLD GHB EST-ACNC: 123 MG/DL (ref 70–126)
DEPRECATED RDW RBC AUTO: 43.4 FL (ref 35–45)
EKG ATRIAL RATE: 75 BPM
EKG P AXIS: 60 DEGREES
EKG P-R INTERVAL: 158 MS
EKG Q-T INTERVAL: 412 MS
EKG QRS DURATION: 80 MS
EKG QTC CALCULATION (BAZETT): 460 MS
EKG R AXIS: 1 DEGREES
EKG T AXIS: 34 DEGREES
EKG VENTRICULAR RATE: 75 BPM
EOSINOPHIL NFR BLD AUTO: 0.7 %
EOSINOPHILS ABSOLUTE: 0 THOU/MM3 (ref 0–0.4)
ERYTHROCYTE [DISTWIDTH] IN BLOOD BY AUTOMATED COUNT: 12.8 % (ref 11.5–14.5)
FENTANYL: NEGATIVE
GFR SERPL CREATININE-BSD FRML MDRD: 89 ML/MIN/1.73M2
GLUCOSE BLD STRIP.AUTO-MCNC: 116 MG/DL (ref 70–108)
GLUCOSE SERPL-MCNC: 123 MG/DL (ref 74–109)
GLUCOSE UR QL STRIP.AUTO: NEGATIVE MG/DL
HBA1C MFR BLD HPLC: 6.1 % (ref 4–6)
HCT VFR BLD AUTO: 45.1 % (ref 37–47)
HDLC SERPL-MCNC: 42 MG/DL
HGB BLD-MCNC: 15.3 GM/DL (ref 12–16)
HGB UR QL STRIP.AUTO: NEGATIVE
IMM GRANULOCYTES # BLD AUTO: 0.01 THOU/MM3 (ref 0–0.07)
IMM GRANULOCYTES NFR BLD AUTO: 0.1 %
INR PPP: 0.84 (ref 0.85–1.13)
KETONES UR QL STRIP.AUTO: NEGATIVE
LDLC SERPL CALC-MCNC: 78 MG/DL
LYMPHOCYTES ABSOLUTE: 2.4 THOU/MM3 (ref 1–4.8)
LYMPHOCYTES NFR BLD AUTO: 34.1 %
MCH RBC QN AUTO: 31.3 PG (ref 26–33)
MCHC RBC AUTO-ENTMCNC: 33.9 GM/DL (ref 32.2–35.5)
MCV RBC AUTO: 92.2 FL (ref 81–99)
MONOCYTES ABSOLUTE: 0.5 THOU/MM3 (ref 0.4–1.3)
MONOCYTES NFR BLD AUTO: 7.1 %
NEUTROPHILS ABSOLUTE: 4 THOU/MM3 (ref 1.8–7.7)
NEUTROPHILS NFR BLD AUTO: 57.3 %
NITRITE UR QL STRIP: NEGATIVE
NRBC BLD AUTO-RTO: 0 /100 WBC
OPIATES UR QL SCN: NEGATIVE
OSMOLALITY SERPL CALC.SUM OF ELEC: 284.2 MOSMOL/KG (ref 275–300)
OXYCODONE: NEGATIVE
PCP UR QL SCN: NEGATIVE
PH UR STRIP.AUTO: 7 [PH] (ref 5–9)
PLATELET # BLD AUTO: 244 THOU/MM3 (ref 130–400)
PMV BLD AUTO: 10.1 FL (ref 9.4–12.4)
POC CREATININE WHOLE BLOOD: 0.7 MG/DL (ref 0.5–1.2)
POTASSIUM SERPL-SCNC: 4.5 MEQ/L (ref 3.5–5.2)
PROT SERPL-MCNC: 7.2 G/DL (ref 6.4–8.3)
PROT UR STRIP.AUTO-MCNC: NEGATIVE MG/DL
PROTHROMBIN TIME: 9.8 SECONDS (ref 10–13.5)
RBC # BLD AUTO: 4.89 MILL/MM3 (ref 4.2–5.4)
SODIUM SERPL-SCNC: 142 MEQ/L (ref 135–145)
SP GR UR REFRACT.AUTO: > 1.03 (ref 1–1.03)
TRIGL SERPL-MCNC: 187 MG/DL (ref 0–199)
TROPONIN, HIGH SENSITIVITY: 8 NG/L (ref 0–12)
UROBILINOGEN, URINE: 0.2 EU/DL (ref 0–1)
WBC # BLD AUTO: 7 THOU/MM3 (ref 4.8–10.8)
WBC #/AREA URNS HPF: NEGATIVE /[HPF]

## 2025-07-30 PROCEDURE — 80307 DRUG TEST PRSMV CHEM ANLYZR: CPT

## 2025-07-30 PROCEDURE — 6360000002 HC RX W HCPCS: Performed by: EMERGENCY MEDICINE

## 2025-07-30 PROCEDURE — 82565 ASSAY OF CREATININE: CPT

## 2025-07-30 PROCEDURE — 96374 THER/PROPH/DIAG INJ IV PUSH: CPT

## 2025-07-30 PROCEDURE — 80053 COMPREHEN METABOLIC PANEL: CPT

## 2025-07-30 PROCEDURE — 93010 ELECTROCARDIOGRAM REPORT: CPT | Performed by: INTERNAL MEDICINE

## 2025-07-30 PROCEDURE — 2580000003 HC RX 258: Performed by: EMERGENCY MEDICINE

## 2025-07-30 PROCEDURE — 99291 CRITICAL CARE FIRST HOUR: CPT

## 2025-07-30 PROCEDURE — 6370000000 HC RX 637 (ALT 250 FOR IP): Performed by: EMERGENCY MEDICINE

## 2025-07-30 PROCEDURE — 71045 X-RAY EXAM CHEST 1 VIEW: CPT

## 2025-07-30 PROCEDURE — G0378 HOSPITAL OBSERVATION PER HR: HCPCS

## 2025-07-30 PROCEDURE — 99223 1ST HOSP IP/OBS HIGH 75: CPT | Performed by: PHYSICIAN ASSISTANT

## 2025-07-30 PROCEDURE — 85610 PROTHROMBIN TIME: CPT

## 2025-07-30 PROCEDURE — 85730 THROMBOPLASTIN TIME PARTIAL: CPT

## 2025-07-30 PROCEDURE — 96372 THER/PROPH/DIAG INJ SC/IM: CPT

## 2025-07-30 PROCEDURE — 81003 URINALYSIS AUTO W/O SCOPE: CPT

## 2025-07-30 PROCEDURE — 70551 MRI BRAIN STEM W/O DYE: CPT

## 2025-07-30 PROCEDURE — 82948 REAGENT STRIP/BLOOD GLUCOSE: CPT

## 2025-07-30 PROCEDURE — 84484 ASSAY OF TROPONIN QUANT: CPT

## 2025-07-30 PROCEDURE — 6360000004 HC RX CONTRAST MEDICATION: Performed by: EMERGENCY MEDICINE

## 2025-07-30 PROCEDURE — 70498 CT ANGIOGRAPHY NECK: CPT

## 2025-07-30 PROCEDURE — 80061 LIPID PANEL: CPT

## 2025-07-30 PROCEDURE — 70450 CT HEAD/BRAIN W/O DYE: CPT

## 2025-07-30 PROCEDURE — 6360000002 HC RX W HCPCS: Performed by: PHYSICIAN ASSISTANT

## 2025-07-30 PROCEDURE — 93005 ELECTROCARDIOGRAM TRACING: CPT | Performed by: EMERGENCY MEDICINE

## 2025-07-30 PROCEDURE — 36415 COLL VENOUS BLD VENIPUNCTURE: CPT

## 2025-07-30 PROCEDURE — 70496 CT ANGIOGRAPHY HEAD: CPT

## 2025-07-30 PROCEDURE — 99285 EMERGENCY DEPT VISIT HI MDM: CPT

## 2025-07-30 PROCEDURE — 85025 COMPLETE CBC W/AUTO DIFF WBC: CPT

## 2025-07-30 PROCEDURE — 96361 HYDRATE IV INFUSION ADD-ON: CPT

## 2025-07-30 PROCEDURE — 83036 HEMOGLOBIN GLYCOSYLATED A1C: CPT

## 2025-07-30 RX ORDER — POLYETHYLENE GLYCOL 3350 17 G/17G
17 POWDER, FOR SOLUTION ORAL DAILY PRN
Status: DISCONTINUED | OUTPATIENT
Start: 2025-07-30 | End: 2025-08-01 | Stop reason: HOSPADM

## 2025-07-30 RX ORDER — MECLIZINE HCL 25MG 25 MG/1
25 TABLET, CHEWABLE ORAL ONCE
Status: COMPLETED | OUTPATIENT
Start: 2025-07-30 | End: 2025-07-30

## 2025-07-30 RX ORDER — SODIUM CHLORIDE 9 MG/ML
INJECTION, SOLUTION INTRAVENOUS PRN
Status: DISCONTINUED | OUTPATIENT
Start: 2025-07-30 | End: 2025-08-01 | Stop reason: HOSPADM

## 2025-07-30 RX ORDER — SODIUM CHLORIDE 9 MG/ML
INJECTION, SOLUTION INTRAVENOUS CONTINUOUS
Status: DISCONTINUED | OUTPATIENT
Start: 2025-07-30 | End: 2025-08-01 | Stop reason: HOSPADM

## 2025-07-30 RX ORDER — IOPAMIDOL 755 MG/ML
80 INJECTION, SOLUTION INTRAVASCULAR
Status: COMPLETED | OUTPATIENT
Start: 2025-07-30 | End: 2025-07-30

## 2025-07-30 RX ORDER — ENOXAPARIN SODIUM 100 MG/ML
30 INJECTION SUBCUTANEOUS 2 TIMES DAILY
Status: DISCONTINUED | OUTPATIENT
Start: 2025-07-30 | End: 2025-08-01 | Stop reason: HOSPADM

## 2025-07-30 RX ORDER — 0.9 % SODIUM CHLORIDE 0.9 %
500 INTRAVENOUS SOLUTION INTRAVENOUS ONCE
Status: COMPLETED | OUTPATIENT
Start: 2025-07-30 | End: 2025-07-30

## 2025-07-30 RX ORDER — ACETAMINOPHEN 650 MG/1
650 SUPPOSITORY RECTAL EVERY 6 HOURS PRN
Status: DISCONTINUED | OUTPATIENT
Start: 2025-07-30 | End: 2025-08-01 | Stop reason: HOSPADM

## 2025-07-30 RX ORDER — SODIUM CHLORIDE 0.9 % (FLUSH) 0.9 %
5-40 SYRINGE (ML) INJECTION PRN
Status: DISCONTINUED | OUTPATIENT
Start: 2025-07-30 | End: 2025-08-01 | Stop reason: HOSPADM

## 2025-07-30 RX ORDER — ONDANSETRON 2 MG/ML
4 INJECTION INTRAMUSCULAR; INTRAVENOUS ONCE
Status: COMPLETED | OUTPATIENT
Start: 2025-07-30 | End: 2025-07-30

## 2025-07-30 RX ORDER — ONDANSETRON 2 MG/ML
4 INJECTION INTRAMUSCULAR; INTRAVENOUS EVERY 6 HOURS PRN
Status: DISCONTINUED | OUTPATIENT
Start: 2025-07-30 | End: 2025-08-01 | Stop reason: HOSPADM

## 2025-07-30 RX ORDER — POTASSIUM CHLORIDE 1500 MG/1
40 TABLET, EXTENDED RELEASE ORAL PRN
Status: DISCONTINUED | OUTPATIENT
Start: 2025-07-30 | End: 2025-08-01 | Stop reason: HOSPADM

## 2025-07-30 RX ORDER — NICOTINE 21 MG/24HR
1 PATCH, TRANSDERMAL 24 HOURS TRANSDERMAL DAILY PRN
Status: DISCONTINUED | OUTPATIENT
Start: 2025-07-30 | End: 2025-08-01 | Stop reason: HOSPADM

## 2025-07-30 RX ORDER — SODIUM CHLORIDE 0.9 % (FLUSH) 0.9 %
5-40 SYRINGE (ML) INJECTION EVERY 12 HOURS SCHEDULED
Status: DISCONTINUED | OUTPATIENT
Start: 2025-07-30 | End: 2025-08-01 | Stop reason: HOSPADM

## 2025-07-30 RX ORDER — POTASSIUM CHLORIDE 7.45 MG/ML
10 INJECTION INTRAVENOUS PRN
Status: DISCONTINUED | OUTPATIENT
Start: 2025-07-30 | End: 2025-08-01 | Stop reason: HOSPADM

## 2025-07-30 RX ORDER — ACETAMINOPHEN 325 MG/1
650 TABLET ORAL EVERY 6 HOURS PRN
Status: DISCONTINUED | OUTPATIENT
Start: 2025-07-30 | End: 2025-08-01 | Stop reason: HOSPADM

## 2025-07-30 RX ORDER — MAGNESIUM SULFATE IN WATER 40 MG/ML
2000 INJECTION, SOLUTION INTRAVENOUS PRN
Status: DISCONTINUED | OUTPATIENT
Start: 2025-07-30 | End: 2025-08-01 | Stop reason: HOSPADM

## 2025-07-30 RX ORDER — ONDANSETRON 4 MG/1
4 TABLET, ORALLY DISINTEGRATING ORAL EVERY 8 HOURS PRN
Status: DISCONTINUED | OUTPATIENT
Start: 2025-07-30 | End: 2025-08-01 | Stop reason: HOSPADM

## 2025-07-30 RX ADMIN — SODIUM CHLORIDE: 0.9 INJECTION, SOLUTION INTRAVENOUS at 21:53

## 2025-07-30 RX ADMIN — ONDANSETRON 4 MG: 2 INJECTION, SOLUTION INTRAMUSCULAR; INTRAVENOUS at 09:15

## 2025-07-30 RX ADMIN — SODIUM CHLORIDE: 0.9 INJECTION, SOLUTION INTRAVENOUS at 15:01

## 2025-07-30 RX ADMIN — SODIUM CHLORIDE 500 ML: 0.9 INJECTION, SOLUTION INTRAVENOUS at 09:14

## 2025-07-30 RX ADMIN — MECLIZINE HYDROCHLORIDE 25 MG: 25 TABLET, CHEWABLE ORAL at 09:15

## 2025-07-30 RX ADMIN — IOPAMIDOL 80 ML: 755 INJECTION, SOLUTION INTRAVENOUS at 08:27

## 2025-07-30 RX ADMIN — ENOXAPARIN SODIUM 30 MG: 100 INJECTION SUBCUTANEOUS at 17:38

## 2025-07-30 ASSESSMENT — PAIN - FUNCTIONAL ASSESSMENT
PAIN_FUNCTIONAL_ASSESSMENT: NONE - DENIES PAIN
PAIN_FUNCTIONAL_ASSESSMENT: NONE - DENIES PAIN

## 2025-07-30 ASSESSMENT — VISUAL ACUITY: VA_NORMAL: 1

## 2025-07-30 NOTE — ED PROVIDER NOTES
[facility]  [address] [facility] [address]  Phone: [phone]      CHIEF COMPLAINT       Chief Complaint   Patient presents with    Dizziness       Nurses Notes reviewed and I agree except as noted in the HPI.      HISTORY OF PRESENT ILLNESS    Kristan Johnson is a 50 y.o. female.    Patient had onset of dizziness while driving a car yesterday at 1300.  That is the last known normal.  The dizziness has progressed to the point where she feels totally unsteady while walking and feeling like she is going a fall.  Nursing staff confirmed that her gait does seem to be way off to the extent that we called this out as a stroke alert.    She does not have any focal weakness on the right or left side, no visual deficits, no difficulty with speech.    No syncope.    Did not describe any ear symptoms    REVIEW OF SYSTEMS           PAST MEDICAL HISTORY    has a past medical history of Anxiety, Asthma, Bipolar 1 disorder ([facility]), Cancer ([facility]), Cerebral artery occlusion with cerebral infarction ([facility]), COPD (chronic obstructive pulmonary disease) ([facility]), Depression, Diabetes mellitus ([facility]), and PTSD (post-traumatic stress disorder).    SURGICAL HISTORY      has a past surgical history that includes Tubal ligation; Colonoscopy (N/A, 7/16/2025); Upper gastrointestinal endoscopy (N/A, 7/16/2025); and Colonoscopy (7/16/2025).    CURRENT MEDICATIONS       Current Discharge Medication List        CONTINUE these medications which have NOT CHANGED    Details   albuterol (PROVENTIL) (2.5 MG/3ML) 0.083% nebulizer solution Take 3 mLs by nebulization every 6 hours as needed for Wheezing  Qty: 120 each, Refills: 3    Associated Diagnoses: Chronic obstructive pulmonary disease, unspecified COPD type ([facility])      metFORMIN (GLUCOPHAGE) 500 MG tablet Take 1 tablet by mouth 2 times daily (with meals)  Qty: 180 tablet, Refills: 1    Associated Diagnoses: Type 2 diabetes mellitus without complication, without long-term current use of insulin

## 2025-07-30 NOTE — ED NOTES
Pt presents to the ED with complaints of dizziness since yesterday at 1300 while driving to [place]. Pt states she needed assistance walking and talking yesterday.  Pt states she has been dizzy since yesterday at 1300 and has progressively gotten worse.Pt required assistance getting from wheelchair to cot. Pt denies any pain. Pt respirations are even and unlabored.  POCT 116.

## 2025-07-30 NOTE — CONSULTS
Neurology Stroke Alert Note    Date:7/30/2025       Room:[address] [place] [address]  Patient Name:Kristan Johnson     YOB: 1975     Age:50 y.o.  Chief Complaint   Patient presents with    Dizziness        Requesting Physician: Tejinder Murillo DO     Reason for Consult:  Evaluate for stroke alert    Subjective       HPI: Kristan Johnson who is a 50 y.o. female with a history of bipolar 1 disorder, COPD, diabetes, PTSD, previous stroke who presented to [facility] ED today for further evaluation of dizziness. Patient states she was driving yesterday in [place] when she developed sudden onset dizziness starting at 1:00 PM. She states that the dizziness got worse and she began to feel nauseous to which she decided to seek further evaluation. Stroke alert activated at 8:21 AM. Initial blood pressure 140/81. Initial glucose 116. Initial NIH found to 2 for right upper and right lower extremity drift. Patient states she is chronically weaker on her right side from a previous stroke. Stat CT head obtained and negative for hemorrhage. Given time of last known well, patient deemed not a candidate for thrombolytics. Stat CTA head and neck obtained and negative for large vessel occlusion. Given this, patient deemed not a candidate for neurointervention. MRI brain WO contrast obtained and negative for acute infarct. Suspect patient's dizziness is likely secondary to BPPV vs orthostatic hypotension.      Last known well:  1:00 PM 7/29.  Time of stroke alert:  8:21 AM.  Time of neurology arrival:  8:25 AM.  Vascular risk factors:  diabetes, previous stroke.  Initial glucose: 116 .  Old deficits from prior stroke:  right sided weakness.  INR in ED if anticoagulated:  not applicable.  Initial blood pressure:  140/81.  Initial NIHSS: 2.  Pre-morbid Modified [place] Scale:  0.   Time of initial imaging read:  8:44 AM.  Thrombolytic administered:  not indicated/contraindicated.  Thrombectomy performed:  not indicated.  Puncture time:  not    INR 0.84*   APTT 33.9     LIVER PROFILE:No results for input(s): \"AST\", \"ALT\", \"BILIDIR\", \"BILITOT\", \"ALKPHOS\" in the last 72 hours.  CHOLESTEROL AND A1C:No results for input(s): \"HDL\", \"CHOL\", \"TRIG\", \"LABA1C\" in the last 720 hours.    Invalid input(s): \"LDLCALC\"   Imaging Last 24 Hours:  No results found.      Assessment and Plan:        Dizziness likely secondary to BPPV vs orthostatic hypotension:    Neuro:  Imaging  CT revealed no acute intracranial abnormality.  CTA of head and neck revealed no large vessel occlusion or hemodynamically significant stenosis. No need for carotid ultrasound.  MRI of brain without contrast negative for acute infarct.  Stat CT head is needed if the patient develops new-onset altered mental status, a severe headache, or new-onset neurologic deficit  Risk factors and medications  Blood pressure goal: less than 130/80.  Keep well hydrated. Initiate normal saline at 75 ml/hr as needed.  Antithrombotics: aspirin 81 mg daily  HgbA1C 6.1. If the patient has diabetes, recommend tight control of A1c with goal of <7.0 if attainable.   LDL 78. LDL goal of 45-70. Patient currently with elevated liver enzymes, will hold off on starting statin at this time.  Patient to follow up with primary care provider for repeat lipid panel and A1c every 3 months to ensure she is staying within goal for secondary stroke prevention.   Core stroke metrics  PT/OT/SLP consult. IPR consult if applicable.  NIHSS every shift. Neuro checks per unit unless otherwise specified.  Pre-morbid Modified [place] Scale: 0 - No symptoms at all.    Pulmonary:    SpO2 greater than 94%     Cardiovascular:  2D Echo with bubble study can be ordered per primary team.  Maintain telemetry, monitor for atrial-fib  EKG and cardiac enzymes x1 now.     Renal:  Start 0.9% Saline IV at 75 mL/hr.  Monitor and replete electrolytes as necessary.     GI:  Dysphagia screen prior to oral intake.  NPO for now pending swallow evaluation.

## 2025-07-30 NOTE — PLAN OF CARE
Problem: Chronic Conditions and Co-morbidities  Goal: Patient's chronic conditions and co-morbidity symptoms are monitored and maintained or improved  Outcome: Progressing  Flowsheets (Taken 7/30/2025 1513)  Care Plan - Patient's Chronic Conditions and Co-Morbidity Symptoms are Monitored and Maintained or Improved:   Monitor and assess patient's chronic conditions and comorbid symptoms for stability, deterioration, or improvement   Collaborate with multidisciplinary team to address chronic and comorbid conditions and prevent exacerbation or deterioration   Update acute care plan with appropriate goals if chronic or comorbid symptoms are exacerbated and prevent overall improvement and discharge     Problem: Discharge Planning  Goal: Discharge to home or other facility with appropriate resources  Outcome: Progressing  Flowsheets (Taken 7/30/2025 1513)  Discharge to home or other facility with appropriate resources:   Identify barriers to discharge with patient and caregiver   Arrange for needed discharge resources and transportation as appropriate   Identify discharge learning needs (meds, wound care, etc)   Refer to discharge planning if patient needs post-hospital services based on physician order or complex needs related to functional status, cognitive ability or social support system     Problem: Safety - Adult  Goal: Free from fall injury  Outcome: Progressing     Problem: Skin/Tissue Integrity  Goal: Skin integrity remains intact  Description: 1.  Monitor for areas of redness and/or skin breakdown  2.  Assess vascular access sites hourly  3.  Every 4-6 hours minimum:  Change oxygen saturation probe site  4.  Every 4-6 hours:  If on nasal continuous positive airway pressure, respiratory therapy assess nares and determine need for appliance change or resting period  Outcome: Progressing

## 2025-07-30 NOTE — ED NOTES
ED to inpatient nurses report      Chief Complaint:  Chief Complaint   Patient presents with    Dizziness     Present to ED from: home    MOA:     LOC: alert and orientated to name, place, date  Mobility: Requires assistance * 1  Oxygen Baseline: RA    Current needs required: RA     Code Status:   Full Code    What abnormal results were found and what did you give/do to treat them? IV fluids and meclizine   Any procedures or intervention occur? none    Mental Status:  Level of Consciousness: Alert (0)    Psych Assessment:        Vitals:  Patient Vitals for the past 24 hrs:   BP Temp Temp src Pulse Resp SpO2 Height Weight   07/30/25 1142 (!) 141/82 -- -- 74 17 99 % -- --   07/30/25 1045 -- -- -- 71 -- -- -- --   07/30/25 1004 (!) 110/58 -- -- 68 18 99 % -- --   07/30/25 0909 118/71 -- -- 71 17 99 % -- --   07/30/25 0816 (!) 140/81 98.4 °F (36.9 °C) Oral 74 16 97 % 1.626 m (5' 4\") 133.4 kg (294 lb)        LDAs:   Peripheral IV 07/30/25 Distal;Left Cephalic (Active)   Site Assessment Clean, dry & intact 07/30/25 0819   Line Status Blood return noted;Flushed;Specimen collected 07/30/25 0819       Ambulatory Status:  No data recorded    Diagnosis:  DISPOSITION Admitted 07/30/2025 12:24:59 PM   Final diagnoses:   None        Consults:  IP CONSULT TO STROKE TEAM     Pain Score:  Pain Assessment  Pain Assessment: None - Denies Pain    C-SSRS:   Risk of Suicide: No Risk    Sepsis Screening:       [place] Fall Risk:       Swallow Screening        Preferred Language:   English      ALLERGIES     Blue dyes (parenteral) and Penicillins    SURGICAL HISTORY       Past Surgical History:   Procedure Laterality Date    COLONOSCOPY N/A 7/16/2025    COLONOSCOPY POLYPECTOMY SNARE/BIOPSY performed by Charles Ballard MD at [facility] ENDOSCOPY    COLONOSCOPY  7/16/2025    COLONOSCOPY BIOPSY performed by Charles Ballard MD at [facility] ENDOSCOPY    TUBAL LIGATION      UPPER GASTROINTESTINAL ENDOSCOPY N/A 7/16/2025    ESOPHAGOGASTRODUODENOSCOPY

## 2025-07-30 NOTE — H&P
Hospitalist History & Physical    Patient:  Kristan Johnson    Unit/Bed:04/004A  YOB: 1975  MRN: [id number]   PCP: Corrina Cummings MD  Code Status: Full Code    Date of Service: The patient was seen and examined on 07/30/25 and admitted to Observation with an expected length of stay of less than two midnights due to medical therapy.     Chief Complaint: Dizziness    Assessment/Plan:    Vertigo  CT of the head, MRI of the brain negative for acute abnormalities. CTA of the head and neck with no hemodynamically significant stenoses.  Suspect likely BPPV given positional component.  Vestibular therapy consultation.  Antiemetics as needed.  Non-insulin-dependent type 2 diabetes mellitus  Hold oral meds. Carb controlled diet. Accuchecks. Low dose sliding scale.  COPD/asthma not in acute exacerbation  Albuterol as needed.  Transaminitis  Mild elevation of AST and ALT noted.  Repeat hepatic function panel tomorrow morning.  Generalized anxiety disorder  Not currently on pharmacotherapy.  Nicotine dependence  Nicotine replacement ordered as needed.    History of Present Illness:  Kristan Johnson is a 50 y.o. female with a history of nicotine dependence, generalized anxiety disorder, COPD/asthma, and non-insulin-dependent type 2 diabetes mellitus who presented to [facility] with chief complaint of dizziness.  The patient reports yesterday she began having dizziness while driving her car.  She reports a spinning sensation.  She notes that this seems to get worse when she lays flat and is better when she lays on her side.  She did note that he seemed to be provoked when rolling over in bed.  She reports associated nausea, but has not had any vomiting.  No other associated focal neurologic deficits including vision loss, extremity weakness, facial weakness, slurred speech, aphasia, or numbness.  She does report some chronic weakness and numbness in her right side secondary to a prior \"mini  Right common carotid artery/ICA: There is no significant hemodynamic stenosis in the right common and internal carotid arteries. Left common carotid artery/ICA: There is no significant hemodynamic stenosis in the left common and internal carotid arteries. Vertebral arteries: There is a dominant right and hypoplastic left vertebral artery with antegrade flow bilaterally.. CTA HEAD: Internal carotid arteries: There is antegrade flow in the right and left internal carotid arteries.. Middle cerebral arteries: There is antegrade flow in the right and left middle cerebral arteries. Anterior cerebral arteries: There is antegrade flow in the right and left anterior cerebral arteries.. Vertebral arteries: There is a dominant right and hypoplastic left vertebral artery with antegrade flow bilaterally.. Basilar artery: There is antegrade flow in the basilar artery.. Superior cerebellar arteries: There is antegrade flow in the right and left superior cerebellar arteries.. Posterior cerebral arteries: There is antegrade flow in the right and left posterior cerebral arteries. No aneurysms, stenoses or occlusions are noted. The superior sagittal sinus, vein of [place], internal cerebral veins, straight sinus, transverse sinuses and sigmoid sinuses are patent. Axial source data: Unremarkable.     1. Negative CTA of the head and neck. 2. Diffusion MRI scan of the brain would be helpful for better evaluation in this patient. **This report has been created using voice recognition software. It may contain minor errors which are inherent in voice recognition technology.** Electronically signed by Dr. Harshil Rosado    CTA NECK W WO CONTRAST (CODE STROKE)  Result Date: 7/30/2025  PROCEDURE: CTA NECK W WO CONTRAST, CTA HEAD W WO CONTRAST CLINICAL INFORMATION: stroke symptoms. COMPARISON: No prior study. TECHNIQUE: 1 mm axial images were obtained through the head and neck after the fast bolus administration of contrast. A noncontrast localizer

## 2025-07-31 LAB
ALBUMIN SERPL BCG-MCNC: 3.7 G/DL (ref 3.4–4.9)
ALP SERPL-CCNC: 68 U/L (ref 38–126)
ALT SERPL W/O P-5'-P-CCNC: 68 U/L (ref 10–35)
ANION GAP SERPL CALC-SCNC: 11 MEQ/L (ref 8–16)
AST SERPL-CCNC: 31 U/L (ref 10–35)
BASOPHILS ABSOLUTE: 0 THOU/MM3 (ref 0–0.1)
BASOPHILS NFR BLD AUTO: 0.5 %
BILIRUB CONJ SERPL-MCNC: 0.2 MG/DL (ref 0–0.2)
BILIRUB SERPL-MCNC: 0.3 MG/DL (ref 0.3–1.2)
BUN SERPL-MCNC: 13 MG/DL (ref 8–23)
CALCIUM SERPL-MCNC: 9.1 MG/DL (ref 8.6–10)
CHLORIDE SERPL-SCNC: 107 MEQ/L (ref 98–111)
CO2 SERPL-SCNC: 24 MEQ/L (ref 22–29)
CREAT SERPL-MCNC: 0.9 MG/DL (ref 0.5–0.9)
DEPRECATED RDW RBC AUTO: 45 FL (ref 35–45)
EOSINOPHIL NFR BLD AUTO: 1 %
EOSINOPHILS ABSOLUTE: 0.1 THOU/MM3 (ref 0–0.4)
ERYTHROCYTE [DISTWIDTH] IN BLOOD BY AUTOMATED COUNT: 13 % (ref 11.5–14.5)
GFR SERPL CREATININE-BSD FRML MDRD: 77 ML/MIN/1.73M2
GLUCOSE SERPL-MCNC: 124 MG/DL (ref 74–109)
HCT VFR BLD AUTO: 41 % (ref 37–47)
HGB BLD-MCNC: 13.6 GM/DL (ref 12–16)
IMM GRANULOCYTES # BLD AUTO: 0.02 THOU/MM3 (ref 0–0.07)
IMM GRANULOCYTES NFR BLD AUTO: 0.3 %
LYMPHOCYTES ABSOLUTE: 2.6 THOU/MM3 (ref 1–4.8)
LYMPHOCYTES NFR BLD AUTO: 43.9 %
MCH RBC QN AUTO: 31.2 PG (ref 26–33)
MCHC RBC AUTO-ENTMCNC: 33.2 GM/DL (ref 32.2–35.5)
MCV RBC AUTO: 94 FL (ref 81–99)
MONOCYTES ABSOLUTE: 0.4 THOU/MM3 (ref 0.4–1.3)
MONOCYTES NFR BLD AUTO: 7.4 %
NEUTROPHILS ABSOLUTE: 2.8 THOU/MM3 (ref 1.8–7.7)
NEUTROPHILS NFR BLD AUTO: 46.9 %
NRBC BLD AUTO-RTO: 0 /100 WBC
PLATELET # BLD AUTO: 200 THOU/MM3 (ref 130–400)
PMV BLD AUTO: 10.4 FL (ref 9.4–12.4)
POTASSIUM SERPL-SCNC: 4.5 MEQ/L (ref 3.5–5.2)
PROT SERPL-MCNC: 6.2 G/DL (ref 6.4–8.3)
RBC # BLD AUTO: 4.36 MILL/MM3 (ref 4.2–5.4)
SODIUM SERPL-SCNC: 142 MEQ/L (ref 135–145)
WBC # BLD AUTO: 5.9 THOU/MM3 (ref 4.8–10.8)

## 2025-07-31 PROCEDURE — 96372 THER/PROPH/DIAG INJ SC/IM: CPT

## 2025-07-31 PROCEDURE — 6370000000 HC RX 637 (ALT 250 FOR IP): Performed by: PHYSICIAN ASSISTANT

## 2025-07-31 PROCEDURE — 80076 HEPATIC FUNCTION PANEL: CPT

## 2025-07-31 PROCEDURE — G0378 HOSPITAL OBSERVATION PER HR: HCPCS

## 2025-07-31 PROCEDURE — 97162 PT EVAL MOD COMPLEX 30 MIN: CPT

## 2025-07-31 PROCEDURE — 99232 SBSQ HOSP IP/OBS MODERATE 35: CPT | Performed by: PHYSICIAN ASSISTANT

## 2025-07-31 PROCEDURE — 36415 COLL VENOUS BLD VENIPUNCTURE: CPT

## 2025-07-31 PROCEDURE — 2500000003 HC RX 250 WO HCPCS: Performed by: PHYSICIAN ASSISTANT

## 2025-07-31 PROCEDURE — 80048 BASIC METABOLIC PNL TOTAL CA: CPT

## 2025-07-31 PROCEDURE — 85025 COMPLETE CBC W/AUTO DIFF WBC: CPT

## 2025-07-31 PROCEDURE — 6360000002 HC RX W HCPCS: Performed by: PHYSICIAN ASSISTANT

## 2025-07-31 PROCEDURE — 97530 THERAPEUTIC ACTIVITIES: CPT

## 2025-07-31 PROCEDURE — 97116 GAIT TRAINING THERAPY: CPT

## 2025-07-31 RX ORDER — MECLIZINE HCL 25MG 25 MG/1
25 TABLET, CHEWABLE ORAL 3 TIMES DAILY PRN
Status: DISCONTINUED | OUTPATIENT
Start: 2025-07-31 | End: 2025-08-01 | Stop reason: HOSPADM

## 2025-07-31 RX ADMIN — ENOXAPARIN SODIUM 30 MG: 100 INJECTION SUBCUTANEOUS at 09:10

## 2025-07-31 RX ADMIN — POLYETHYLENE GLYCOL 3350 17 G: 17 POWDER, FOR SOLUTION ORAL at 11:58

## 2025-07-31 RX ADMIN — SODIUM CHLORIDE, PRESERVATIVE FREE 10 ML: 5 INJECTION INTRAVENOUS at 20:20

## 2025-07-31 RX ADMIN — ENOXAPARIN SODIUM 30 MG: 100 INJECTION SUBCUTANEOUS at 20:20

## 2025-07-31 ASSESSMENT — PAIN SCALES - GENERAL
PAINLEVEL_OUTOF10: 0
PAINLEVEL_OUTOF10: 0

## 2025-07-31 NOTE — PROGRESS NOTES
Hospitalist Progress Note    Patient:  Kristan Johnson      Unit/Bed:8A-09/009-A    YOB: 1975    MRN: [id number]       Acct: [id number]     PCP: Corrina Cummings MD    Date of Admission: 7/30/2025    Assessment/Plan:    Vertigo:  CT of the head, MRI of the brain negative for acute abnormalities. CTA of the head and neck with no hemodynamically significant stenoses.  PT consulted for Vestibular therapy, reportedly did not elicit nystagmus, await formal recs  Meclizine ordered TID PRN  Antiemetics as needed.    Non-insulin-dependent type 2 diabetes mellitus:  Hold oral meds. Carb controlled diet. Accuchecks. Low dose sliding scale.    COPD/asthma not in acute exacerbation:  Albuterol as needed.    Transaminitis:  Mild elevation of AST and ALT noted.  Repeat hepatic function panel with improvement, only mild ALT elevation at 68    Generalized anxiety disorder:  Not currently on pharmacotherapy.    Nicotine dependence:  Nicotine replacement ordered as needed.    DVT Prophylaxis: [] Lovenox / [] Heparin / [] SCDs / [] Already on Systemic Anticoagulation / [] None     Disposition:    [x] Home       [] TCU       [] Rehab       [] Psych       [] SNF       [] Long Term Care Facility       [] Other-    Chief Complaint: Dizziness      Subjective: 50 y.o. female admitted to the hospitalist service for dizziness. Patient with negative CT head and MRI. Patient reports symptoms are improved today but she still has dizziness with positional movements. Per nursing and patient report, therapy worked with patient and did not think it was BPPV. Patient reports they told her that her eyes did not move. She denies headache. She reports her vision makes it appear like things or words are moving back and forth. Clinically, symptoms still sounds like BPPV. Meclizine ordered. Patient feels she needs one more day in the hospital because she lives alone.    Medications:    Infusion Medications    sodium chloride 100 mL/hr  at 07/30/25 2153    sodium chloride       Scheduled Medications    sodium chloride flush  5-40 mL IntraVENous 2 times per day    enoxaparin  30 mg SubCUTAneous BID     PRN Meds: sodium chloride flush, sodium chloride, potassium chloride **OR** potassium alternative oral replacement **OR** potassium chloride, magnesium sulfate, ondansetron **OR** ondansetron, polyethylene glycol, acetaminophen **OR** acetaminophen, nicotine    No intake or output data in the 24 hours ending 07/31/25 0905    Diet:  ADULT DIET; Regular; 5 carb choices (75 gm/meal)    Exam:  BP (!) 98/56   Pulse 64   Temp 97.5 °F (36.4 °C) (Oral)   Resp 18   Ht 1.626 m (5' 4\")   Wt 133.4 kg (294 lb)   LMP 04/18/2021 Comment: age 44  SpO2 95%   BMI 50.46 kg/m²     Physical Exam  Constitutional:       Appearance: She is obese.      Interventions: She is not intubated.  Pulmonary:      Effort: No respiratory distress. She is not intubated.   Abdominal:      Palpations: Abdomen is soft.      Tenderness: There is no abdominal tenderness.   Neurological:      Mental Status: She is alert.   Psychiatric:         Speech: She is communicative.        Labs:   Recent Labs     07/30/25  0820 07/31/25  0538   WBC 7.0 5.9   HGB 15.3 13.6   HCT 45.1 41.0    200     Recent Labs     07/30/25  0820 07/31/25  0538    142   K 4.5 4.5    107   CO2 24 24   BUN 12 13   CREATININE 0.8 0.9   CALCIUM 9.7 9.1     Recent Labs     07/30/25  0820 07/31/25  0538   AST 40* 31   ALT 84* 68*   BILIDIR  --  0.2   BILITOT 0.4 0.3   ALKPHOS 87 68     Recent Labs     07/30/25  0820   INR 0.84*     No results for input(s): \"CKTOTAL\", \"TROPONINI\" in the last 72 hours.    Urinalysis:      Lab Results   Component Value Date/Time    NITRU NEGATIVE 07/30/2025 10:59 AM    WBCUA 0-2 05/02/2018 10:29 AM    BACTERIA NONE 05/02/2018 10:29 AM    RBCUA 0-2 05/02/2018 10:29 AM    BLOODU NEGATIVE 07/30/2025 10:59 AM    GLUCOSEU NEGATIVE 07/30/2025 10:59 AM       Radiology:  MRI

## 2025-07-31 NOTE — PROGRESS NOTES
[facility]  INPATIENT PHYSICAL THERAPY  EVALUATION  [facility] - 8A-09/009-A    Discharge Recommendations: Continue to assess pending progress, Patient would benefit from continued therapy after discharge  Equipment Recommendations:    cont to monitor for needs            Time In: 715  Time Out: 744  Timed Code Treatment Minutes: 11 Minutes  Minutes: 29          Date: 2025  Patient Name: Kristan Johnson,  Gender:  female        MRN: [id number]  : 1975  (50 y.o.)      Referring Practitioner: CHELSEA Leary  Diagnosis: Vertigo  Additional Pertinent Hx: Kristan Johnson is a 50 y.o. female with a history of nicotine dependence, generalized anxiety disorder, COPD/asthma, and non-insulin-dependent type 2 diabetes mellitus who presented to [facility] with chief complaint of dizziness.  The patient reports yesterday she began having dizziness while driving her car.  She reports a spinning sensation.  She notes that this seems to get worse when she lays flat and is better when she lays on her side.  She did note that he seemed to be provoked when rolling over in bed.  She reports associated nausea, but has not had any vomiting.  No other associated focal neurologic deficits including vision loss, extremity weakness, facial weakness, slurred speech, aphasia, or numbness.  She does report some chronic weakness and numbness in her right side secondary to a prior \"mini stroke.\" Vertigo - CT of the head, MRI of the brain negative for acute abnormalities. CTA of the head and neck with no hemodynamically significant stenoses.     Restrictions/Precautions:  Restrictions/Precautions: Fall Risk                   Subjective:  Chart Reviewed: Yes  Patient assessed for rehabilitation services?: Yes  Subjective: pt reports room spinning dizziness but in most positions with noticeable eyelid twitching throughout eval but no nystagmus noted. pt reports a lot of stressors in her life

## 2025-07-31 NOTE — PLAN OF CARE
Problem: Chronic Conditions and Co-morbidities  Goal: Patient's chronic conditions and co-morbidity symptoms are monitored and maintained or improved  Outcome: Progressing  Flowsheets (Taken 7/30/2025 1513 by Omar Michael, RN)  Care Plan - Patient's Chronic Conditions and Co-Morbidity Symptoms are Monitored and Maintained or Improved:   Monitor and assess patient's chronic conditions and comorbid symptoms for stability, deterioration, or improvement   Collaborate with multidisciplinary team to address chronic and comorbid conditions and prevent exacerbation or deterioration   Update acute care plan with appropriate goals if chronic or comorbid symptoms are exacerbated and prevent overall improvement and discharge     Problem: Discharge Planning  Goal: Discharge to home or other facility with appropriate resources  Outcome: Progressing  Flowsheets (Taken 7/30/2025 1513 by Omar Michael, RN)  Discharge to home or other facility with appropriate resources:   Identify barriers to discharge with patient and caregiver   Arrange for needed discharge resources and transportation as appropriate   Identify discharge learning needs (meds, wound care, etc)   Refer to discharge planning if patient needs post-hospital services based on physician order or complex needs related to functional status, cognitive ability or social support system     Problem: Safety - Adult  Goal: Free from fall injury  Outcome: Progressing  Flowsheets (Taken 7/31/2025 0925)  Free From Fall Injury: Instruct family/caregiver on patient safety     Problem: Skin/Tissue Integrity  Goal: Skin integrity remains intact  Description: 1.  Monitor for areas of redness and/or skin breakdown  2.  Assess vascular access sites hourly  3.  Every 4-6 hours minimum:  Change oxygen saturation probe site  4.  Every 4-6 hours:  If on nasal continuous positive airway pressure, respiratory therapy assess nares and determine need for appliance  change or resting period  Outcome: Progressing  Flowsheets (Taken 7/31/2025 4364)  Skin Integrity Remains Intact:   Monitor for areas of redness and/or skin breakdown   Assess vascular access sites hourly   Reviewed plan of care and all new orders for today. Patient verbalized understanding.

## 2025-08-01 VITALS
SYSTOLIC BLOOD PRESSURE: 119 MMHG | HEIGHT: 64 IN | WEIGHT: 293 LBS | DIASTOLIC BLOOD PRESSURE: 79 MMHG | BODY MASS INDEX: 50.02 KG/M2 | RESPIRATION RATE: 16 BRPM | OXYGEN SATURATION: 98 % | TEMPERATURE: 97 F | HEART RATE: 64 BPM

## 2025-08-01 PROCEDURE — G0378 HOSPITAL OBSERVATION PER HR: HCPCS

## 2025-08-01 PROCEDURE — 99238 HOSP IP/OBS DSCHRG MGMT 30/<: CPT | Performed by: PHYSICIAN ASSISTANT

## 2025-08-01 RX ORDER — MECLIZINE HCL 25MG 25 MG/1
25 TABLET, CHEWABLE ORAL 3 TIMES DAILY PRN
Qty: 90 TABLET | Refills: 0 | Status: SHIPPED | OUTPATIENT
Start: 2025-08-01

## 2025-08-01 NOTE — PLAN OF CARE
Problem: Chronic Conditions and Co-morbidities  Goal: Patient's chronic conditions and co-morbidity symptoms are monitored and maintained or improved  8/1/2025 1023 by Maxine Hightower RN  Outcome: Adequate for Discharge  Flowsheets (Taken 8/1/2025 0847)  Care Plan - Patient's Chronic Conditions and Co-Morbidity Symptoms are Monitored and Maintained or Improved: Monitor and assess patient's chronic conditions and comorbid symptoms for stability, deterioration, or improvement  7/31/2025 2326 by Ayden Goodwin RN  Outcome: Progressing  Flowsheets (Taken 7/31/2025 2032)  Care Plan - Patient's Chronic Conditions and Co-Morbidity Symptoms are Monitored and Maintained or Improved: Monitor and assess patient's chronic conditions and comorbid symptoms for stability, deterioration, or improvement     Problem: Discharge Planning  Goal: Discharge to home or other facility with appropriate resources  8/1/2025 1023 by Maxine Hightower RN  Outcome: Adequate for Discharge  Flowsheets (Taken 8/1/2025 0847)  Discharge to home or other facility with appropriate resources: Identify barriers to discharge with patient and caregiver  7/31/2025 2326 by Ayden Goodwin RN  Outcome: Progressing  Flowsheets (Taken 7/31/2025 2032)  Discharge to home or other facility with appropriate resources: Identify barriers to discharge with patient and caregiver     Problem: Safety - Adult  Goal: Free from fall injury  8/1/2025 1023 by Maxine Hightower RN  Outcome: Adequate for Discharge  7/31/2025 2326 by Ayden Goodwin RN  Outcome: Progressing     Problem: Skin/Tissue Integrity  Goal: Skin integrity remains intact  Description: 1.  Monitor for areas of redness and/or skin breakdown  2.  Assess vascular access sites hourly  3.  Every 4-6 hours minimum:  Change oxygen saturation probe site  4.  Every 4-6 hours:  If on nasal continuous positive airway pressure, respiratory therapy assess nares and determine need for appliance change or resting

## 2025-08-01 NOTE — NURSE NAVIGATOR
Tele / Pulse Ox  Code: Full   Consults: Hospitalist, PT/OT,     Situation:   Date  Narrative    07/30 Onset of dizziness while driving yesterday at 1300. Dizziness progresses to imbalance during ambulation.  Upon arrival to ED pt has unsteady gait. No other deficits.     Stroke Alert  EKG NSR  CXR Neg   CTA Head / Neck Neg  MRI Neg  Mild ALT / AST Elevation    07/31  PT Vestibular Therapy - Neg Nystagmus  No longer Dizzy   Refusing IV Fluids      Hx: Anxiety, Asthma, BP 1, CA, Cerebral Artery Occlusion with Cerebral Infarction, COPD, Depression, DM2, PTSD, Current Smoker, Nondrinker, Prevous Crack Cocaine Use,   Dx: Vertigo, Transaminitis,   Assess: AO4, Independent,   Plan: Outpt PT Vestibular Therapy recommended, Likely DC 08/01    IV: 20g L Upper Arm,   Diet: Regular 75gm/meal carbs,     Aware:   Refusing IV Fluids     To Do:   Q4 VS     Note to patient: The 21st Century Cures Act requires that medical notes like this one to be available to patients in the interest of transparency. Please be advised, this is a medical document. It is intended as mqfr-mr-zifi communication. It is written in medical language and may contain abbreviations and verbiage that may be unfamiliar. It may appear to read blunt or direct. Medical documents are intended to carry relevant medical information, facts as evident and the clinical opinion of the practitioner.

## 2025-08-01 NOTE — DISCHARGE SUMMARY
Hospital Medicine Discharge Summary      Patient Identification:   Kristan Johnson   : 1975  MRN: [id number]   Account: [id number]      Patient's PCP: Corrina Cummings MD    Admit Date: 2025     Discharge Date: 2025    Admitting Physician: No admitting provider for patient encounter.     Discharge Physician: Jason Blanca PA-C     Kristan Johnson is a 50 y.o. female admitted to [facility] on 2025.      HPI On Admission From H&P:    \"Kristan Johnson is a 50 y.o. female with a history of nicotine dependence, generalized anxiety disorder, COPD/asthma, and non-insulin-dependent type 2 diabetes mellitus who presented to [facility] with chief complaint of dizziness.  The patient reports yesterday she began having dizziness while driving her car.  She reports a spinning sensation.  She notes that this seems to get worse when she lays flat and is better when she lays on her side.  She did note that he seemed to be provoked when rolling over in bed.  She reports associated nausea, but has not had any vomiting.  No other associated focal neurologic deficits including vision loss, extremity weakness, facial weakness, slurred speech, aphasia, or numbness.  She does report some chronic weakness and numbness in her right side secondary to a prior \"mini stroke.\"  She denies any ringing in the ears or hearing loss.  No recent upper respiratory symptoms.  She does currently smoke.  No alcohol or drug use.  No other complaints at this time.\"    Assessment/Plan With Discharge Diagnoses:    Vertigo:  CT of the head, MRI of the brain negative for acute abnormalities.  CTA of the head and neck with no hemodynamically significant stenoses.  PT consulted for Vestibular therapy, reportedly did not elicit nystagmus, await formal recs  Meclizine prescribed upon d/c TID PRN  Outpatient referral to vestibular therapy placed     Non-insulin-dependent type 2 diabetes mellitus:  Resume  8/4/2025  2:30 p.m.         Discharge Medications:        Medication List        START taking these medications      meclizine 25 MG Chew  Commonly known as: ANTIVERT  Take 1 tablet by mouth 3 times daily as needed (Dizziness)            CONTINUE taking these medications      * albuterol sulfate  (90 Base) MCG/ACT inhaler  Commonly known as: Ventolin HFA  Inhale 2 puffs into the lungs every 6 hours as needed for Wheezing     * albuterol (2.5 MG/3ML) 0.083% nebulizer solution  Commonly known as: PROVENTIL  Take 3 mLs by nebulization every 6 hours as needed for Wheezing     blood glucose monitor kit and supplies  Dispense sufficient amount for indicated testing frequency plus additional to accommodate PRN testing needs. Dispense all needed supplies to include: monitor, strips, lancing device, lancets, control solutions, alcohol swabs.     metFORMIN 500 MG tablet  Commonly known as: GLUCOPHAGE  Take 1 tablet by mouth 2 times daily (with meals)     nicotine 14 MG/24HR  Commonly known as: NICODERM CQ           * This list has 2 medication(s) that are the same as other medications prescribed for you. Read the directions carefully, and ask your doctor or other care provider to review them with you.                   Where to Get Your Medications        These medications were sent to [facility] - [facility] [address] - P [phone] - F [phone]  [address] [place] [id number]      Phone: [phone]   meclizine 25 MG Chew        Time Spent on discharge is <30 minutes.    Thank you Corrina Cummings MD for the opportunity to be involved in this patient's care.      Signed:    Electronically signed by Jason Blanca PA-C on 8/1/25 at 8:46 AM EDT

## 2025-08-01 NOTE — PLAN OF CARE
Problem: Chronic Conditions and Co-morbidities  Goal: Patient's chronic conditions and co-morbidity symptoms are monitored and maintained or improved  Outcome: Progressing  Flowsheets (Taken 7/31/2025 2032)  Care Plan - Patient's Chronic Conditions and Co-Morbidity Symptoms are Monitored and Maintained or Improved: Monitor and assess patient's chronic conditions and comorbid symptoms for stability, deterioration, or improvement     Problem: Discharge Planning  Goal: Discharge to home or other facility with appropriate resources  Outcome: Progressing  Flowsheets (Taken 7/31/2025 2032)  Discharge to home or other facility with appropriate resources: Identify barriers to discharge with patient and caregiver     Problem: Safety - Adult  Goal: Free from fall injury  Outcome: Progressing   Fall assessment completed. Patient using call light appropriately to call for assistance with ambulation to bathroom.  Personal items within reach. Patient is also compliant with use of non-skid slippers.  Placed bed in low position    Problem: Skin/Tissue Integrity  Goal: Skin integrity remains intact  Description: 1.  Monitor for areas of redness and/or skin breakdown  2.  Assess vascular access sites hourly  3.  Every 4-6 hours minimum:  Change oxygen saturation probe site  4.  Every 4-6 hours:  If on nasal continuous positive airway pressure, respiratory therapy assess nares and determine need for appliance change or resting period  Outcome: Progressing  Flowsheets (Taken 7/31/2025 2032)  Skin Integrity Remains Intact:   Monitor for areas of redness and/or skin breakdown   Assess vascular access sites hourly     Care plan reviewed with patient.  Patient  verbalize understanding of the plan of care and contribute to goal setting.

## 2025-08-01 NOTE — NURSE NAVIGATOR
Pt states that she thinks her BP being low is what is making her have vertigo.  Pt states when she drinks coffee in the morning her BP is WNL and that is why her BP is WNL this morning.  Pt states when she eats brandyn seeds her BP becomes low.  Pt educated on BP and nutrition, medications, and lifestyle regarding this.  Pt states she has had intermittent dizziness this morning and all night last night though PM RN offgoing states she didn't have dizziness last night.  Pt has 0/10 pain.  Pt sits on edge of bed and states \"I'm trying to get out of here\" new DC order in place.  Pt has call light in reach, bed locked in low position.

## (undated) DEVICE — BIOGUARD A/W CLEANING ADAPTER

## (undated) DEVICE — BASIC SINGLE BASIN BTC-LF: Brand: MEDLINE INDUSTRIES, INC.